# Patient Record
Sex: FEMALE | NOT HISPANIC OR LATINO | Employment: STUDENT | URBAN - METROPOLITAN AREA
[De-identification: names, ages, dates, MRNs, and addresses within clinical notes are randomized per-mention and may not be internally consistent; named-entity substitution may affect disease eponyms.]

---

## 2017-02-16 ENCOUNTER — TELEPHONE (OUTPATIENT)
Dept: OBGYN | Facility: CLINIC | Age: 22
End: 2017-02-16

## 2017-02-16 NOTE — TELEPHONE ENCOUNTER
Left  monica Kan that is ok to take prescribed Nitrofurantoin for UTI s/s as long as she isn't pregnant.  I instructed her to call in 2-3 days if no improvement with taking the medicine.    If she is pregnant I told her to call back.

## 2017-02-23 ENCOUNTER — OFFICE VISIT (OUTPATIENT)
Dept: OBGYN | Facility: CLINIC | Age: 22
End: 2017-02-23
Payer: COMMERCIAL

## 2017-02-23 VITALS
SYSTOLIC BLOOD PRESSURE: 122 MMHG | HEIGHT: 65 IN | WEIGHT: 133.1 LBS | DIASTOLIC BLOOD PRESSURE: 78 MMHG | BODY MASS INDEX: 22.17 KG/M2 | HEART RATE: 82 BPM

## 2017-02-23 DIAGNOSIS — B37.31 CANDIDIASIS OF VULVA AND VAGINA: Primary | ICD-10-CM

## 2017-02-23 PROCEDURE — 99212 OFFICE O/P EST SF 10 MIN: CPT | Mod: ZF

## 2017-02-23 RX ORDER — FLUCONAZOLE 150 MG/1
150 TABLET ORAL ONCE
Qty: 1 TABLET | Refills: 0 | Status: SHIPPED | OUTPATIENT
Start: 2017-02-23 | End: 2017-02-23

## 2017-02-23 NOTE — PROGRESS NOTES
SUBJECTIVE:   21 year old  female complains of thick white vaginal discharge for 2 days.  She has had some itching and irritation as well.  She completed a 5-day course of antibiotics for a UTI yesterday. She states that when she was previously on antibiotics she developed a yeast infection and feels this is what is happening again. She feels her UTI has resolved.   Denies abnormal vaginal bleeding or significant pelvic pain or fever.  She is having regular periods and denies any menstrual concerns.   She has been sexually active with a monogamous partner since August of 2016. Both she and her partner have been tested for STIs and results were negative. Using condoms for contraception, sometimes using withdrawal method. Denies dyspareunia.    No LMP recorded.    OBJECTIVE:   She appears well, afebrile.  Abdomen: benign, soft, nontender.  Pelvic Exam: Normal vagina and vulva, tenderness to palpation and placement of the speculum at introitus. A moderate amount of thick, white vaginal discharge is present at introitus and throughout vaginal canal.  Cervix without lesions or tenderness.    Wet prep exam was performed and demonstrated budding yeast and hyphae    Urinalysis was not performed    ASSESSMENT:   Candidiasis of vulva and vagina    PLAN:   Treatment: Diflucan 150 mg x 1 dose  Return to clinic if symptoms do not resolve or worsen.    I, EMILIANA Chatman, am serving as a scribe to document services personally performed by Yaquelin Lockhart CNM based on the providers statements to me.  -EMILIANA Chatman    This encounter was performed by me and scribed by the student. The scribed note accurately reflects my personal services and decisions made by me. Yaquelin EPSTEIN CNM

## 2017-02-23 NOTE — NURSING NOTE
Chief Complaint   Patient presents with     Consult For     possible yeast infection       Diane Brown, [facility] 2/23/2017

## 2017-02-23 NOTE — MR AVS SNAPSHOT
After Visit Summary   2/23/2017    Loreta Roth    MRN: [id number]           Patient Information     Date Of Birth          1995        Visit Information        Provider Department      2/23/2017 1:00 PM Yaquelin Lockhart APRN CNM [facility]        Today's Diagnoses     Candidiasis of vulva and vagina    -  1       Follow-ups after your visit        Follow-up notes from your care team     Return in about 1 year (around 2/23/2018).      Your next 10 appointments already scheduled     May 01, 2017  1:15 PM CDT   Nurse Visit with [facility] Nurse   [facility] ([facility])    [facility] [address]  [address]  [facility] [address] [phone]              Who to contact     Please call your clinic at [phone] to:    Ask questions about your health    Make or cancel appointments    Discuss your medicines    Learn about your test results    Speak to your doctor   If you have compliments or concerns about an experience at your clinic, or if you wish to file a complaint, please contact [facility] Patient Relations at [phone] or email us at [email or web address]         Additional Information About Your Visit        MyChart Information     [employer] gives you secure access to your electronic health record. If you see a primary care provider, you can also send messages to your care team and make appointments. If you have questions, please call your primary care clinic.  If you do not have a primary care provider, please call [phone] and they will assist you.      [employer] is an electronic gateway that provides easy, online access to your medical records. With [employer], you can request a clinic appointment, read your test results, renew a prescription or communicate with your care team.     To access your existing account, please contact your [facility]  [facility] [facility] or call [phone] for assistance.        Care EveryWhere ID     This is your Care EveryWhere ID. This could be used by other organizations to access your [place] medical records  [id number]        Your Vitals Were     Pulse Height BMI (Body Mass Index)             82 1.651 m (5' 5\") 22.15 kg/m2          Blood Pressure from Last 3 Encounters:   02/23/17 122/78   11/01/16 121/83    Weight from Last 3 Encounters:   02/23/17 60.4 kg (133 lb 1.6 oz)   11/01/16 61 kg (134 lb 6.4 oz)              Today, you had the following     No orders found for display         Today's Medication Changes          These changes are accurate as of: 2/23/17  4:57 PM.  If you have any questions, ask your nurse or doctor.               Start taking these medicines.        Dose/Directions    fluconazole 150 MG tablet   Commonly known as:  DIFLUCAN   Used for:  Candidiasis of vulva and vagina        Dose:  150 mg   Take 1 tablet (150 mg) by mouth once for 1 dose   Quantity:  1 tablet   Refills:  0            Where to get your medicines      These medications were sent to [id number] - [place], [place] [address] [facility] [address]  [address], [place] [address]    Hours:  24-hours Phone:  [phone]     fluconazole 150 MG tablet                Primary Care Provider    None Specified       No primary provider on file.        Thank you!     Thank you for choosing [facility]  for your care. Our goal is always to provide you with excellent care. Hearing back from our patients is one way we can continue to improve our services. Please take a few minutes to complete the written survey that you may receive in the mail after your visit with us. Thank you!             Your Updated Medication List - Protect others around you: Learn how to safely use, store and throw away your medicines at www.disposemymeds.org.          This list is accurate as of: " 2/23/17  4:57 PM.  Always use your most recent med list.                   Brand Name Dispense Instructions for use    ALEVE PO      Take by mouth as needed for moderate pain       DAILY MULTIVITAMIN PO          fluconazole 150 MG tablet    DIFLUCAN    1 tablet    Take 1 tablet (150 mg) by mouth once for 1 dose       VYVANSE 40 MG capsule   Generic drug:  lisdexamfetamine

## 2017-03-16 ENCOUNTER — TELEPHONE (OUTPATIENT)
Dept: OBGYN | Facility: CLINIC | Age: 22
End: 2017-03-16

## 2017-03-16 NOTE — TELEPHONE ENCOUNTER
Telephone call from Loreta and she reports next to her labia there is a dime size area that she thinks is a cyst. She describes this as painful and she has tried to 'pop it' but the pain is too much. She denies any drainage from this area. Call transferred to appointment desk to make this appointment, instructed to take warm sitz baths or warm packs to area.

## 2017-03-17 ENCOUNTER — OFFICE VISIT (OUTPATIENT)
Dept: OBGYN | Facility: CLINIC | Age: 22
End: 2017-03-17
Attending: NURSE PRACTITIONER
Payer: COMMERCIAL

## 2017-03-17 VITALS
DIASTOLIC BLOOD PRESSURE: 71 MMHG | BODY MASS INDEX: 21.91 KG/M2 | WEIGHT: 131.5 LBS | HEIGHT: 65 IN | HEART RATE: 71 BPM | SYSTOLIC BLOOD PRESSURE: 109 MMHG

## 2017-03-17 DIAGNOSIS — L72.3 SEBACEOUS CYST: Primary | ICD-10-CM

## 2017-03-17 DIAGNOSIS — R23.9 SKIN CHANGE: ICD-10-CM

## 2017-03-17 NOTE — MR AVS SNAPSHOT
After Visit Summary   3/17/2017    Loreta Roth    MRN: [id number]           Patient Information     Date Of Birth          1995        Visit Information        Provider Department      3/17/2017 11:15 AM Rhea Walters APRN CNP [facility]        Today's Diagnoses     Sebaceous cyst    -  1    Skin change           Follow-ups after your visit        Your next 10 appointments already scheduled     Mar 31, 2017  1:15 PM CDT   Return Visit with Jia Salinas MD   [facility] ([facility])    [facility] [address]  [address]  [address]  [facility] [address] [phone]            May 01, 2017  1:15 PM CDT   Nurse Visit with [facility] Nurse   [facility] ([facility])    [facility] [address]  [address]  [address]  [facility] [address] [phone]              Who to contact     Please call your clinic at [phone] to:    Ask questions about your health    Make or cancel appointments    Discuss your medicines    Learn about your test results    Speak to your doctor   If you have compliments or concerns about an experience at your clinic, or if you wish to file a complaint, please contact [facility] Patient Relations at [phone] or email us at [email or web address]         Additional Information About Your Visit        MyChart Information     [employer] gives you secure access to your electronic health record. If you see a primary care provider, you can also send messages to your care team and make appointments. If you have questions, please call your primary care clinic.  If you do not have a primary care provider, please call [phone] and they will assist you.      [employer] is an electronic gateway that provides easy, online access to your medical records. With [employer], you can request a clinic appointment,  "read your test results, renew a prescription or communicate with your care team.     To access your existing account, please contact your [facility] or call [phone] for assistance.        Care EveryWhere ID     This is your Care EveryWhere ID. This could be used by other organizations to access your [place] medical records  [id number]        Your Vitals Were     Pulse Height Last Period BMI (Body Mass Index)          71 1.651 m (5' 5\") 03/07/2017 21.88 kg/m2         Blood Pressure from Last 3 Encounters:   03/17/17 109/71   02/23/17 122/78   11/01/16 121/83    Weight from Last 3 Encounters:   03/17/17 59.6 kg (131 lb 8 oz)   02/23/17 60.4 kg (133 lb 1.6 oz)   11/01/16 61 kg (134 lb 6.4 oz)              Today, you had the following     No orders found for display       Primary Care Provider    None Specified       No primary provider on file.        Thank you!     Thank you for choosing [facility]  for your care. Our goal is always to provide you with excellent care. Hearing back from our patients is one way we can continue to improve our services. Please take a few minutes to complete the written survey that you may receive in the mail after your visit with us. Thank you!             Your Updated Medication List - Protect others around you: Learn how to safely use, store and throw away your medicines at www.disposemymeds.org.          This list is accurate as of: 3/17/17 11:59 PM.  Always use your most recent med list.                   Brand Name Dispense Instructions for use    ALEVE PO      Take by mouth as needed for moderate pain Reported on 3/17/2017       DAILY MULTIVITAMIN PO          VYVANSE 40 MG capsule   Generic drug:  lisdexamfetamine            "

## 2017-03-17 NOTE — LETTER
"3/17/2017       RE: Loreta Roth  [address]  [place] [address]     Dear Colleague,    Thank you for referring your patient, Loreta Roth, to the [facility] at [facility]. Please see a copy of my visit note below.    SUBJECTIVE:   21 year old female, , complains of having a cyst on her mons pubis.     Has been present for since 2017.  Fluctuates in size from the size of a quarter to the size of a dime, but never completely resolved.  Occasionally feels swollen and inflamed, but today those symptoms have resolved.  Had tried to \"pop\" it, but stopped because it was too tender.  Feels this first started as an ingrown hair. Does shave and occasionally wax pubic hair. Denies fever and erythema or discharge from the location.  Occasionally feels itchy, which is improved with hydrocortisone cream (has applied it approximately once per week).  Denies history of known exposure to STD. Her and her current partner were tested for STDs in 2016; no new partners since then.      Reports dyspareunia with insertion ever since she started being intimate with her current partner (since 2016).  Denies abdominal and pelvic pain.      Has not tried taking baths; does not have access to a clean bathtub where she lives.  Also has not tried warm compresses.       Patient's last menstrual period was 2017.  Contraception: condoms 50% of the time and withdrawal method  Last pap: 2016    OBJECTIVE:   She appears well, afebrile.  Abdomen: benign, soft, non-tender, no masses.  Pelvic Exam:   Pea-sized, fluctuant lump on left side of mons pubis; no raised center or \"head,\" without erythema, drainage, or tenderness    Thickened, bumpy, whitened/ grey tissue at introitus; without distinct lesions, small excoriation in skin noted, without bleeding; no erythema or drainage; extremely tender to the touch.      Labia minora and majora " with normal genital architecture.      ASSESSMENT:      Skin change  Sebaceous cyst    PLAN:   Treatment:   - Skin change at Introitus: Patient referred to ObGyn physician for further evaluation.  A mirror was utilized to show patient the location of this thickened white/grey tissue.  Provided patient with lidocaine gel to apply to this location given its significant tenderness.    - Pea-sized Sebaceous cyst on mons pubis: Recommended patient apply warm compresses 3 times per day. Instructed Loreta to not use a needle to try to release the fluid.  May gently massage the cyst after application of warm compresses.   If not resolved, patient to also address this at appointment with ObGyn physician.    Loreta expressed understanding and had no further questions at the end of this visit.    20 minutes was spent in direct contact with the patient and > 50% of the time in patient education and coordination of care.    Rhea Walters, PRASAD, APRN, WHNP

## 2017-03-19 NOTE — PROGRESS NOTES
"SUBJECTIVE:   21 year old female, , complains of having a cyst on her mons pubis.     Has been present for since 2017.  Fluctuates in size from the size of a quarter to the size of a dime, but never completely resolved.  Occasionally feels swollen and inflamed, but today those symptoms have resolved.  Had tried to \"pop\" it, but stopped because it was too tender.  Feels this first started as an ingrown hair. Does shave and occasionally wax pubic hair. Denies fever and erythema or discharge from the location.  Occasionally feels itchy, which is improved with hydrocortisone cream (has applied it approximately once per week).  Denies history of known exposure to STD. Her and her current partner were tested for STDs in 2016; no new partners since then.      Reports dyspareunia with insertion ever since she started being intimate with her current partner (since 2016).  Denies abdominal and pelvic pain.      Has not tried taking baths; does not have access to a clean bathtub where she lives.  Also has not tried warm compresses.       Patient's last menstrual period was 2017.  Contraception: condoms 50% of the time and withdrawal method  Last pap: 2016    OBJECTIVE:   She appears well, afebrile.  Abdomen: benign, soft, non-tender, no masses.  Pelvic Exam:   Pea-sized, fluctuant lump on left side of mons pubis; no raised center or \"head,\" without erythema, drainage, or tenderness    Thickened, bumpy, whitened/ grey tissue at introitus; without distinct lesions, small excoriation in skin noted, without bleeding; no erythema or drainage; extremely tender to the touch.      Labia minora and majora with normal genital architecture.      ASSESSMENT:      Skin change  Sebaceous cyst    PLAN:   Treatment:   - Skin change at Introitus: Patient referred to ObGyn physician for further evaluation.  A mirror was utilized to show patient the location of this thickened white/grey tissue.  Provided " patient with lidocaine gel to apply to this location given its significant tenderness.    - Pea-sized Sebaceous cyst on mons pubis: Recommended patient apply warm compresses 3 times per day. Instructed Loreta to not use a needle to try to release the fluid.  May gently massage the cyst after application of warm compresses.   If not resolved, patient to also address this at appointment with ObGyn physician.    Loreta expressed understanding and had no further questions at the end of this visit.    20 minutes was spent in direct contact with the patient and > 50% of the time in patient education and coordination of care.    Rhea Walters, DNP, APRN, WHNP

## 2017-03-31 ENCOUNTER — OFFICE VISIT (OUTPATIENT)
Dept: OBGYN | Facility: CLINIC | Age: 22
End: 2017-03-31
Attending: OBSTETRICS & GYNECOLOGY
Payer: COMMERCIAL

## 2017-03-31 VITALS
SYSTOLIC BLOOD PRESSURE: 116 MMHG | BODY MASS INDEX: 22.17 KG/M2 | DIASTOLIC BLOOD PRESSURE: 72 MMHG | WEIGHT: 133.2 LBS | HEART RATE: 66 BPM

## 2017-03-31 DIAGNOSIS — N90.89 LESION OF VULVA: Primary | ICD-10-CM

## 2017-03-31 DIAGNOSIS — N90.89 LESION OF VULVA: ICD-10-CM

## 2017-03-31 PROCEDURE — 99212 OFFICE O/P EST SF 10 MIN: CPT | Mod: ZF

## 2017-03-31 PROCEDURE — 88305 TISSUE EXAM BY PATHOLOGIST: CPT | Performed by: STUDENT IN AN ORGANIZED HEALTH CARE EDUCATION/TRAINING PROGRAM

## 2017-03-31 ASSESSMENT — PAIN SCALES - GENERAL: PAINLEVEL: NO PAIN (0)

## 2017-03-31 NOTE — NURSING NOTE
Chief Complaint   Patient presents with     RECHECK     Follow-up perineal itching   Nilam Garrido LPN

## 2017-03-31 NOTE — PROGRESS NOTES
[facility]  Gynecology Visit    Reason for Consult: Introital skin lesion, dyspareunia  Consulting Provider: LUCIAN Dunn CNP    HPI:    Loreta Roth is a 21 year old , here for introital skin changes associated with dyspareunia. Dyspareunia started 10/2016 when she began having intercourse with current partner in 10/2016. She did have 1 partner beforehand that she did not experience pain with. She feels that this partner is larger and always associated the pain with that. She was given lidocaine gel for tenderness and used this that day which helped. She hasn't had much intercourse since that time and hasn't noticed a real change in the pain. Denies itching or discharge. Looked once at the lesions and noticed it was still blanched 1 week after he appt with Rhea Walters.  Doesn't feel dry with intercourse and has tried lubricant once which irritated her partners skin so hasn't since. She does note that her ADHD medication dries her out a fair amount though and has to drink lots of water and use chapstick frequently because of it.    She did note tiny red sores in Oct, would come one day and bleed then go away (lasts for a day total). These resolved on their own, got STD testing and shave biopsy of lesions at [facility] that were all negative (doesn't remember exactly what they said the shave biopsy was). Hasn't had any since. Denies using new skin products, lotions, soaps, detergent.  Sebaceous cyst that was seen by Romeo, has decreased a lot in size. Hasn't done warm compresses much. Does know this is from shaving, just kind of annoying. Knows she should try compresses more that she just gets busy.    GYN History  - LMP: Patient's last menstrual period was 2017.  - Menses: regular, painful, 7days, clots, heavy, 3 tampons per day  - Pap Smears:   Lab Results   Component Value Date    PAP NIL 2016     - Contraception: condoms 50% of the time plus withdrawal method  -  Sexual Activity/Concerns: Dyspareunia  - Hx STIs/UTIs: Denies, UTIs approx. 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    FamHx:  Not significant    ROS: 10-Point ROS negative except as noted in HPI    Physical Exam  /72  Pulse 66  Wt 60.4 kg (133 lb 3.2 oz)  LMP 2017  Breastfeeding? No  BMI 22.17 kg/m2  Gen: Well-appearing, NAD  HEENT: Normocephalic, atraumatic  Abd: Soft, non-tener, non-distended  Ext: No LE edema, extremities warm and well perfused    Pelvic:  Normal labia minora and majora architecture. Normal hair distribution. At the posterior fourchette there is grey/white verrucous region approx. 2cm wide and 1cm of depth into the vagina. Vagina is without lesions. White physiologic discharge. Cervix nulliparous, no lesions, no cervical motion tenderness. Uterus is small, mobile, non-tender. No adnexal tenderness or masses    Assessment/Plan:  Loreta Roth is a 21 year old  female here for painful lesions of posterior fourchette. It is possible that this lesion is a genital wart or scar tissue from multiple micro-tears with intercourse. It is not characteristic of either in particular.  - biopsy of region taken to the left of midline (see procedure note below ), I personally will call the patient with results  - S/p 2 injections of HPV series (16, 16), last injection can be given after 17 and thus can get at next appt, will offer  - estrogen cream, directed to apply to posterior fourchette daily PRN    Return to clinic in 2-3 weeks for f/u appt    Staffed with Dr. Amaury Salinas MD  OBGYN PGY-1  [phone]  3/31/2017, 1:20 PM    Procedure Note    ASSESSMENT:   Vulvar lesion    PLAN:   Vulvar Punch Biopsy Procedure:    Time Out -  "\"Pause for the Cause\"  Just before the procedure begins, through verbal and active participation of team members, verify:   Initials   Patient Name SMB   Patient  SMB   Procedure to be performed SMB     Preoperative Diagnosis: vulvar lesions   Postoperative Diagnosis:  same    Consent:   Risks, benefits of treatment, and alternate forms of evaluation were discussed.  Patient's questions were elicited and answered.Verbal consent obtained. Written consent signed and scanned    Skin Preparation:  Betadine    Anesthesia: lidocaine gel and lidocaine plain 1% subcutaneous      A 3 mm punch biopsy obtained at the posterior fourchette slightly to the left of midline.  Specimens were placed in labeled Formalin Jar.    EBL:  1ml  Stasis achieved with silver nitrate and pressure. Complications:  None    Specimens sent to Pathology.  Will call with pathology results and recommended follow-up.     Tolerance of Procedure: Patient did tolerate the procedure well.     Follow Up: Rinse with cool water after voiding and defecation. Keep wound dry for 24 hours then may shower. Take OTC pain medications as needed.     Written instructions on wound care and vulvodynia were reviewed and given. Call if redness, swelling, severe pain or any other concerns.     Verbalized understanding and agreement with visit plan.    Jia Salinas MD    I have seen, examined and counseled the patient with the resident. I was present for the procedure.   I reviewed and edited the note   Yaquelin Rebolledo    "

## 2017-03-31 NOTE — MR AVS SNAPSHOT
After Visit Summary   3/31/2017    Loreta Roth    MRN: [id number]           Patient Information     Date Of Birth          1995        Visit Information        Provider Department      3/31/2017 1:15 PM Jia Salinas MD [facility]        Today's Diagnoses     Lesion of vulva    -  1      Care Instructions    Use the cream at the site of pain daily as needed.    We will call with biopsy results.        Follow-ups after your visit        Follow-up notes from your care team     Return in about 3 weeks (around 4/21/2017), or if symptoms worsen or fail to improve.      Your next 10 appointments already scheduled     May 01, 2017  1:15 PM CDT   Nurse Visit with [facility] Nurse   [facility] ([facility])    [facility] [address]  [address]  [facility] [address] [phone]              Who to contact     Please call your clinic at [phone] to:    Ask questions about your health    Make or cancel appointments    Discuss your medicines    Learn about your test results    Speak to your doctor   If you have compliments or concerns about an experience at your clinic, or if you wish to file a complaint, please contact [facility] Patient Relations at [phone] or email us at [email or web address]         Additional Information About Your Visit        MyChart Information     Fyber gives you secure access to your electronic health record. If you see a primary care provider, you can also send messages to your care team and make appointments. If you have questions, please call your primary care clinic.  If you do not have a primary care provider, please call [phone] and they will assist you.      Fyber is an electronic gateway that provides easy, online access to your medical records. With Fyber, you can request a clinic appointment, read your test results,  renew a prescription or communicate with your care team.     To access your existing account, please contact your [facility] or call [phone] for assistance.        Care EveryWhere ID     This is your Care EveryWhere ID. This could be used by other organizations to access your [facility] medical records  [id number]        Your Vitals Were     Pulse Last Period Breastfeeding? BMI (Body Mass Index)          66 03/07/2017 No 22.17 kg/m2         Blood Pressure from Last 3 Encounters:   03/31/17 116/72   03/17/17 109/71   02/23/17 122/78    Weight from Last 3 Encounters:   03/31/17 60.4 kg (133 lb 3.2 oz)   03/17/17 59.6 kg (131 lb 8 oz)   02/23/17 60.4 kg (133 lb 1.6 oz)              We Performed the Following     Surgical pathology exam          Today's Medication Changes          These changes are accurate as of: 3/31/17  2:44 PM.  If you have any questions, ask your nurse or doctor.               Start taking these medicines.        Dose/Directions    conjugated estrogens cream   Commonly known as:  PREMARIN   Used for:  Lesion of vulva        Dose:  0.5 g   Place 0.5 g vaginally daily as needed Apply to area of tenderness at the posterior introitus daily as needed   Quantity:  30 g   Refills:  0            Where to get your medicines      Some of these will need a paper prescription and others can be bought over the counter.  Ask your nurse if you have questions.     Bring a paper prescription for each of these medications     conjugated estrogens cream                Primary Care Provider    None Specified       No primary provider on file.        Thank you!     Thank you for choosing [facility]  for your care. Our goal is always to provide you with excellent care. Hearing back from our patients is one way we can continue to improve our services. Please take a few minutes to complete the written survey that you may receive in the mail after your visit with  us. Thank you!             Your Updated Medication List - Protect others around you: Learn how to safely use, store and throw away your medicines at www.disposemymeds.org.          This list is accurate as of: 3/31/17  2:44 PM.  Always use your most recent med list.                   Brand Name Dispense Instructions for use    ALEVE PO      Take by mouth as needed for moderate pain Reported on 3/17/2017       conjugated estrogens cream    PREMARIN    30 g    Place 0.5 g vaginally daily as needed Apply to area of tenderness at the posterior introitus daily as needed       DAILY MULTIVITAMIN PO          VYVANSE 40 MG capsule   Generic drug:  lisdexamfetamine

## 2017-03-31 NOTE — LETTER
3/31/2017       RE: Loreta Roth  [address]  [address]  [place] [address]     Dear Colleague,    Thank you for referring your patient, Loreta Roth, to the [facility] at [facility]. Please see a copy of my visit note below.    [facility]  Gynecology Visit    Reason for Consult: Introital skin lesion, dyspareunia  Consulting Provider: LUCIAN Dunn CNP    HPI:    Loreta Roth is a 21 year old , here for introital skin changes associated with dyspareunia. Dyspareunia started 10/2016 when she began having intercourse with current partner in 10/2016. She did have 1 partner beforehand that she did not experience pain with. She feels that this partner is larger and always associated the pain with that. She was given lidocaine gel for tenderness and used this that day which helped. She hasn't had much intercourse since that time and hasn't noticed a real change in the pain. Denies itching or discharge. Looked once at the lesions and noticed it was still blanched 1 week after he appt with Rhea Walters.  Doesn't feel dry with intercourse and has tried lubricant once which irritated her partners skin so hasn't since. She does note that her ADHD medication dries her out a fair amount though and has to drink lots of water and use chapstick frequently because of it.    She did note tiny red sores in Oct, would come one day and bleed then go away (lasts for a day total). These resolved on their own, got STD testing and shave biopsy of lesions at [facility] that were all negative (doesn't remember exactly what they said the shave biopsy was). Hasn't had any since. Denies using new skin products, lotions, soaps, detergent.  Sebaceous cyst that was seen by Romeo, has decreased a lot in size. Hasn't done warm compresses much. Does know this is from shaving, just kind of annoying. Knows she should try compresses more that she just  gets busy.    GYN History  - LMP: Patient's last menstrual period was 2017.  - Menses: regular, painful, 7days, clots, heavy, 3 tampons per day  - Pap Smears:   Lab Results   Component Value Date    PAP NIL 2016     - Contraception: condoms 50% of the time plus withdrawal method  - Sexual Activity/Concerns: Dyspareunia  - Hx STIs/UTIs: Denies, UTIs approx. 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    FamHx:  Not significant    ROS: 10-Point ROS negative except as noted in HPI    Physical Exam  /72  Pulse 66  Wt 60.4 kg (133 lb 3.2 oz)  LMP 2017  Breastfeeding? No  BMI 22.17 kg/m2  Gen: Well-appearing, NAD  HEENT: Normocephalic, atraumatic  Abd: Soft, non-tener, non-distended  Ext: No LE edema, extremities warm and well perfused    Pelvic:  Normal labia minora and majora architecture. Normal hair distribution. At the posterior fourchette there is grey/white verrucous region approx. 2cm wide and 1cm of depth into the vagina. Vagina is without lesions. White physiologic discharge. Cervix nulliparous, no lesions, no cervical motion tenderness. Uterus is small, mobile, non-tender. No adnexal tenderness or masses    Assessment/Plan:  Loreta Roth is a 21 year old  female here for painful lesions of posterior fourchette. It is possible that this lesion is a genital wart or scar tissue from multiple micro-tears with intercourse. It is not characteristic of either in particular.  - biopsy of region taken to the left of midline (see procedure note below ), I personally will call the patient with results  - S/p 2 injections of HPV series (16, 16), last injection can be given after 17 and thus can get at next appt, will offer  - estrogen cream, directed  "to apply to posterior fourchette daily PRN    Return to clinic in 2-3 weeks for f/u appt    Staffed with Dr. Amaury Salinas MD  OBGYN PGY-1  [phone]  3/31/2017, 1:20 PM    Procedure Note    ASSESSMENT:   Vulvar lesion    PLAN:   Vulvar Punch Biopsy Procedure:    Time Out - \"Pause for the Cause\"  Just before the procedure begins, through verbal and active participation of team members, verify:   Initials   Patient Name SMB   Patient  SMB   Procedure to be performed SMB     Preoperative Diagnosis: vulvar lesions   Postoperative Diagnosis:  same    Consent:   Risks, benefits of treatment, and alternate forms of evaluation were discussed.  Patient's questions were elicited and answered.Verbal consent obtained. Written consent signed and scanned    Skin Preparation:  Betadine    Anesthesia: lidocaine gel and lidocaine plain 1% subcutaneous      A 3 mm punch biopsy obtained at the posterior fourchette slightly to the left of midline.  Specimens were placed in labeled Formalin Jar.    EBL:  1ml  Stasis achieved with silver nitrate and pressure. Complications:  None    Specimens sent to Pathology.  Will call with pathology results and recommended follow-up.     Tolerance of Procedure: Patient did tolerate the procedure well.     Follow Up: Rinse with cool water after voiding and defecation. Keep wound dry for 24 hours then may shower. Take OTC pain medications as needed.     Written instructions on wound care and vulvodynia were reviewed and given. Call if redness, swelling, severe pain or any other concerns.     Verbalized understanding and agreement with visit plan.    Jia Salinas MD    I have seen, examined and counseled the patient with the resident. I was present for the procedure.   I reviewed and edited the note   Yaquelin Rebolledo    "

## 2017-04-04 LAB — COPATH REPORT: NORMAL

## 2017-04-05 ENCOUNTER — TELEPHONE (OUTPATIENT)
Dept: OBGYN | Facility: CLINIC | Age: 22
End: 2017-04-05

## 2017-04-05 NOTE — TELEPHONE ENCOUNTER
Loreta called requesting to discuss her pathology results. Reviewed with Dr. Rebolledo and Dr. Salinas will contact patient this week.

## 2017-04-07 ENCOUNTER — TELEPHONE (OUTPATIENT)
Dept: OBGYN | Facility: CLINIC | Age: 22
End: 2017-04-07

## 2017-04-07 DIAGNOSIS — D07.1 VIN III (VULVAR INTRAEPITHELIAL NEOPLASIA III): Primary | ICD-10-CM

## 2017-04-07 NOTE — PROGRESS NOTES
Spoke with patient about Vulvar biopsy showing MAURA III. Recommend CO2 laser treatment of the area and vulvoscopy to identify other areas to treat. Instructed she will need Q 6 month vulvoscopy to assess for lesion re-occurrence or spread. Questions answered. Orders in for Vulvoscopy, CO2 laser treatment of vulva.     Cheryle Ash MD   Resident Physician, PGY1  Obstetrics, Gynecology, and Women's Health

## 2017-04-10 ENCOUNTER — TELEPHONE (OUTPATIENT)
Dept: OBGYN | Facility: CLINIC | Age: 22
End: 2017-04-10

## 2017-04-10 NOTE — TELEPHONE ENCOUNTER
Loreta left  that her mother,Kimberly, will be calling to discuss her Path results from 3/31/17 and authorized us to speak with her mother.    Loreta is very upset about the Path results and worried that is was staged 3 and concerned if she needs to get another opinion and seems confused about what the next step is.    I will forward both, Loreta's concerns and her mother Kimberly's to Dr Salinas. Kimberly lives in [place],Loreta is a  of [place] student.   Kimberly would like a call from Dr Salinas,she has many questions I couldn't answer.

## 2017-04-11 ENCOUNTER — TELEPHONE (OUTPATIENT)
Dept: OTHER | Facility: CLINIC | Age: 22
End: 2017-04-11

## 2017-04-11 ENCOUNTER — TELEPHONE (OUTPATIENT)
Dept: OBGYN | Facility: CLINIC | Age: 22
End: 2017-04-11

## 2017-04-11 NOTE — TELEPHONE ENCOUNTER
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 Answered many questions and after explanation of procedures and pros/cons ( varying recovery, knowledge of surgical margins) she decided she would like to go ahead with WLE instead of CO2 laser. She also wanted to know the followin. Naturopath that she would like to go to - I said this was fine, just that I would like to work closely with the naturopath to be sure none of the suggestions may be harmful. She will call the provider and provide me with her number.  2. Does she need another Pap smear? NIL 2016. I will discuss this with my staff and if so we can do one at time of procedure.  3. External hemorrhoid she would like removed if possible. I told her in may be possible to have colorectal come in during her surgery, I will look into a referral and facilitating this if possible.    I did also speak to her mother briefly, but she was busy and will call me to speak in detail this evening.    Jia Salinas MD  OB Gyn PGY1  17 11:52 AM

## 2017-04-18 ENCOUNTER — TELEPHONE (OUTPATIENT)
Dept: OBGYN | Facility: CLINIC | Age: 22
End: 2017-04-18

## 2017-04-18 NOTE — TELEPHONE ENCOUNTER
Attempted to reach Loreta but got vm,left vm for her to call clinic in am to schedule surgery with Dr Rebolledo. I discussed with Dr Rebolledo today and Loreta doesn't need to be seen again before the surgery, and the guzman-op orders that are in place are ok,they don't need to be changed. I forwarded this message to Palmer,our surgery scheduler to call Loreta tomorrow.    I also left vm for Loreta feel free to call to triage tomorrow and we can forward her call to the surgery scheduler.    I also got a vm form Loreta's mother, Kimberly who is concerned because the surgery isn't scheduled yet. She needs to come from [place]. I attempted to call her back as well but was unable to leave a message.

## 2017-04-24 ENCOUNTER — TELEPHONE (OUTPATIENT)
Dept: OBGYN | Facility: CLINIC | Age: 22
End: 2017-04-24

## 2017-04-24 NOTE — TELEPHONE ENCOUNTER
Confirmed surgery date, time and location 5/16/17 with arrival time at 7:30a.m with nothing to eat eight hours before scheduled surgery time and clear liquids up to two hours before scheduled surgery time,informed patient that she will need to sched a pre op physical within thirty days of surgery and that a map and letter will be mailed out.     to complete the following fields:            CHECKLIST     Google Calendar : Yes     Resident notified:YES      Clinic schedule blocked:  Not Applicable    Patient notified:Yes      Pre op information sent: Yes     Given to patient over the phone.Yes    Comments:

## 2017-04-27 ENCOUNTER — TELEPHONE (OUTPATIENT)
Dept: OBGYN | Facility: CLINIC | Age: 22
End: 2017-04-27

## 2017-04-27 NOTE — TELEPHONE ENCOUNTER
Nurse spoke with patient's mother and she is asking about recovery.   Spoke with Dr. Rebolledo in clinic. She expects a 2-3 day recovery. Nurse spoke with Kimberly and she was happy with this information.

## 2017-05-03 ENCOUNTER — ALLIED HEALTH/NURSE VISIT (OUTPATIENT)
Dept: OBGYN | Facility: CLINIC | Age: 22
End: 2017-05-03
Attending: STUDENT IN AN ORGANIZED HEALTH CARE EDUCATION/TRAINING PROGRAM
Payer: COMMERCIAL

## 2017-05-03 DIAGNOSIS — Z23 IMMUNIZATION DUE: Primary | ICD-10-CM

## 2017-05-03 PROCEDURE — 90651 9VHPV VACCINE 2/3 DOSE IM: CPT | Mod: ZF

## 2017-05-03 PROCEDURE — 40000269 ZZH STATISTIC NO CHARGE FACILITY FEE

## 2017-05-03 PROCEDURE — 99212 OFFICE O/P EST SF 10 MIN: CPT | Mod: ZF

## 2017-05-03 PROCEDURE — 25000125 ZZHC RX 250: Mod: ZF

## 2017-05-03 PROCEDURE — 90472 IMMUNIZATION ADMIN EACH ADD: CPT | Mod: ZF

## 2017-05-03 NOTE — MR AVS SNAPSHOT
After Visit Summary   5/3/2017    Loreta Roth    MRN: [id number]           Patient Information     Date Of Birth          1995        Visit Information        Provider Department      5/3/2017 1:15 PM Nurse, [facility]        Today's Diagnoses     Immunization due    -  1       Follow-ups after your visit        Your next 10 appointments already scheduled     May 11, 2017  1:15 PM CDT   Return Visit with Jia Salinas MD   [facility] ([facility])    [facility] [address]  [address]  [facility] [id number] [phone]            May 16, 2017   Procedure with Yaquelin Rebolledo MD   [facility], [place], Same Day Surgery (--)    [address] [id number] [phone]            May 30, 2017  1:15 PM CDT   Return Visit with Maryanne Salazar MD   [facility] ([facility])    [facility] [address]  [address] [facility] [id number] [phone]              Who to contact     Please call your clinic at [phone] to:    Ask questions about your health    Make or cancel appointments    Discuss your medicines    Learn about your test results    Speak to your doctor   If you have compliments or concerns about an experience at your clinic, or if you wish to file a complaint, please contact [facility] Patient Relations at [phone] or email us at [email or web address]         Additional Information About Your Visit        Gennyhart Information     Tracksmitht gives you secure access to your electronic health record. If you see a primary care provider, you can also send messages to your care team and make appointments. If you have questions, please call your primary care clinic.  If you do not have a primary care provider, please call [phone] and they will assist you.      Charisse is an  electronic gateway that provides easy, online access to your medical records. With Reddit, you can request a clinic appointment, read your test results, renew a prescription or communicate with your care team.     To access your existing account, please contact your [facility] or call [phone] for assistance.        Care EveryWhere ID     This is your Care EveryWhere ID. This could be used by other organizations to access your [facility] medical records  [id number]         Blood Pressure from Last 3 Encounters:   03/31/17 116/72   03/17/17 109/71   02/23/17 122/78    Weight from Last 3 Encounters:   03/31/17 60.4 kg (133 lb 3.2 oz)   03/17/17 59.6 kg (131 lb 8 oz)   02/23/17 60.4 kg (133 lb 1.6 oz)              We Performed the Following     HUMAN PAPILLOMAVIRUS VACCINE        Primary Care Provider    None Specified       No primary provider on file.        Thank you!     Thank you for choosing [facility]  for your care. Our goal is always to provide you with excellent care. Hearing back from our patients is one way we can continue to improve our services. Please take a few minutes to complete the written survey that you may receive in the mail after your visit with us. Thank you!             Your Updated Medication List - Protect others around you: Learn how to safely use, store and throw away your medicines at www.disposemymeds.org.          This list is accurate as of: 5/3/17  1:26 PM.  Always use your most recent med list.                   Brand Name Dispense Instructions for use    ALEVE PO      Take by mouth as needed for moderate pain Reported on 3/17/2017       conjugated estrogens cream    PREMARIN    30 g    Place 0.5 g vaginally daily as needed Apply to area of tenderness at the posterior introitus daily as needed       DAILY MULTIVITAMIN PO          VYVANSE 40 MG capsule   Generic drug:  lisdexamfetamine

## 2017-05-03 NOTE — NURSING NOTE
Chief Complaint   Patient presents with     Allied Health Visit     3rd HPV injection       Loreta Roth is a 21 year old female who presents today for 3rd HPV injection , see immunization for details.  Nilam Garrido LPN

## 2017-05-11 ENCOUNTER — OFFICE VISIT (OUTPATIENT)
Dept: OBGYN | Facility: CLINIC | Age: 22
End: 2017-05-11
Attending: STUDENT IN AN ORGANIZED HEALTH CARE EDUCATION/TRAINING PROGRAM
Payer: COMMERCIAL

## 2017-05-11 VITALS
WEIGHT: 129 LBS | BODY MASS INDEX: 21.47 KG/M2 | HEART RATE: 105 BPM | DIASTOLIC BLOOD PRESSURE: 89 MMHG | SYSTOLIC BLOOD PRESSURE: 129 MMHG

## 2017-05-11 DIAGNOSIS — D07.1 VIN III (VULVAR INTRAEPITHELIAL NEOPLASIA III): Primary | ICD-10-CM

## 2017-05-11 DIAGNOSIS — Z00.00 PREVENTATIVE HEALTH CARE: ICD-10-CM

## 2017-05-11 ASSESSMENT — PAIN SCALES - GENERAL: PAINLEVEL: NO PAIN (0)

## 2017-05-11 NOTE — PROGRESS NOTES
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 She saw the naturopath we discussed on the phone who made some diet modifications and vitamin recommendations including eating more greens, taking vit C, a probiotic, selenium, and vit A. There were a few other vitamins as well that she cannot remember. The naturopath agreed that the main treatment was surgical management, but that her therapies would be an adjuvant to help her body clear the HPV. She will message me with the naturopath's contact information.  Otherwise she has had continued itching of her anus and posterior fourchette, which the hydrocortisone cream helps with, no other big changes. Does want repeat STD testing.     She is moving out of her apartment here in the [place] for the summer on June 1st. She will move her things back to [place] where her Mom lives for the summer and return here in Sept. She is planning a trip to [employer] with 3 week internship in [place] then 1 week in [place] with friends from [place] and glad that she is getting this surgery out of the way prior to moving and traveling.     ROS: 10 point ROS neg other than the symptoms noted above in the HPI.      PMHx: ADHD  PSHx: Denies  Meds: Lisdexamfetamine, MV, naproxen  Allergies: PCN - hives    SH: Nonsmoker. Lives [place]. In grad school.    O: /89  Pulse 105  Wt 58.5 kg (129 lb)  LMP 05/05/2017  Breastfeeding? 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 Roth is a 22yo G0 here for follow from new diagnosis of MAURA 3.    1) VIN3 - Planned WLE & CO2 laser for any satellite lesions, will reschedule for Tues May 23rd when both myself and Dr. Rebolledo are available. She is fine with this plan.  - Discussed details of surgery including risks, benefits and alternatives as well as what to expect in terms of healing. Written informed consent obtained.  - She will have a post-op visit at 2-4 weeks with her primary care provider in [place]  - Discussed pathogenesis of disease and surveillance thereafter with vuvloscopy at 6mo intervals.  - Discussed that I do not recommend another Pap smear at this time given recent normal and slow progression of HPV lesions as well as high likelihood of clearance given her age.  2) Desire for STD testing - HIV, RPR, & urine GC/CT ordered. I will call patient with results    Jia Salinas MD PGY1  [facility]  [phone]    The Patient was seen in Resident Continuity Clinic by JIA SALINAS.  I reviewed the history & exam. Assessment and plan were jointly made.    Sri Duncan MD

## 2017-05-11 NOTE — MR AVS SNAPSHOT
After Visit Summary   5/11/2017    Loreta Roth    MRN: [id number]           Patient Information     Date Of Birth          1995        Visit Information        Provider Department      5/11/2017 1:15 PM Jia Salinas MD [facility]        Today's Diagnoses     MAURA III (vulvar intraepithelial neoplasia III)    -  1    Preventative health care           Follow-ups after your visit        Follow-up notes from your care team     Return in about 6 months (around 11/11/2017) for Vulvoscopy for VIN3 s/p WLE 5/23.      Your next 10 appointments already scheduled     May 30, 2017   Procedure with Yaquelin Rebolledo MD   [facility], [place], Same Day Surgery (--)    [address] [address] [phone]              Who to contact     Please call your clinic at [phone] to:    Ask questions about your health    Make or cancel appointments    Discuss your medicines    Learn about your test results    Speak to your doctor   If you have compliments or concerns about an experience at your clinic, or if you wish to file a complaint, please contact [facility] Patient Relations at [phone] or email us at [email or web address]         Additional Information About Your Visit        MyChart Information     [employer] gives you secure access to your electronic health record. If you see a primary care provider, you can also send messages to your care team and make appointments. If you have questions, please call your primary care clinic.  If you do not have a primary care provider, please call [phone] and they will assist you.      [employer] is an electronic gateway that provides easy, online access to your medical records. With [employer], you can request a clinic appointment, read your test results, renew a prescription or communicate with your care team.     To access your existing account, please contact your [facility] or call [phone] for assistance.        Care EveryWhere ID     This is your Care EveryWhere ID. This could be used by other organizations to access your [facility] medical records  [id number]        Your Vitals Were     Pulse Last Period Breastfeeding? BMI (Body Mass Index)          105 05/05/2017 No 21.47 kg/m2         Blood Pressure from Last 3 Encounters:   05/11/17 129/89   03/31/17 116/72   03/17/17 109/71    Weight from Last 3 Encounters:   05/11/17 58.5 kg (129 lb)   03/31/17 60.4 kg (133 lb 3.2 oz)   03/17/17 59.6 kg (131 lb 8 oz)              We Performed the Following     Chlamydia PCR (Clinic Collect)     Gonorrhea PCR        Primary Care Provider    None Specified       No primary provider on file.        Thank you!     Thank you for choosing [facility]  for your care. Our goal is always to provide you with excellent care. Hearing back from our patients is one way we can continue to improve our services. Please take a few minutes to complete the written survey that you may receive in the mail after your visit with us. Thank you!             Your Updated Medication List - Protect others around you: Learn how to safely use, store and throw away your medicines at www.disposemymeds.org.          This list is accurate as of: 5/11/17 11:59 PM.  Always use your most recent med list.                   Brand Name Dispense Instructions for use    ALEVE PO      Take by mouth as needed for moderate pain Reported on 3/17/2017       conjugated estrogens cream    PREMARIN    30 g    Place 0.5 g vaginally daily as needed Apply to area of tenderness at the posterior introitus daily as needed       DAILY MULTIVITAMIN PO          VYVANSE 40 MG capsule   Generic drug:  lisdexamfetamine

## 2017-05-11 NOTE — LETTER
5/11/2017       RE: Loreta Roth  [address]  [address]  [place] [address]     Dear Colleague,    Thank you for referring your patient, Loreta Roth, to the [facility] at [facility]. Please see a copy of my visit note below.    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 She saw the naturopath we discussed on the phone who made some diet modifications and vitamin recommendations including eating more greens, taking vit C, a probiotic, selenium, and vit A. There were a few other vitamins as well that she cannot remember. The naturopath agreed that the main treatment was surgical management, but that her therapies would be an adjuvant to help her body clear the HPV. She will message me with the naturopath's contact information.  Otherwise she has had continued itching of her anus and posterior fourchette, which the hydrocortisone cream helps with, no other big changes. Does want repeat STD testing.     She is moving out of her apartment here in the [place] for the summer on June 1st. She will move her things back to [place] where her Mom lives for the summer and return here in Sept. She is planning a trip to [facility] with 3 week internship in [place] then 1 week in [place] with friends from [place] and glad that she is getting this surgery out of the way prior to moving and traveling.     ROS: 10 point ROS neg other than the symptoms noted above in the HPI.      PMHx: ADHD  PSHx: Denies  Meds: Lisdexamfetamine, MV, naproxen  Allergies: PCN - hives  SH: Nonsmoker. Lives in [place] In grad school.    O: /89  Pulse 105  Wt 58.5 kg (129 lb)  LMP 05/05/2017  Breastfeeding? No  BMI 21.47 kg/m2  Gen: sitting up, no acute distess  Cardiac: RRR, no murmur rub or gallop  Pulm: CTAB throughout posterior lung fields  Abd: soft, nontender, normoactive BS  Ext: no edema,  nontender    VULVA, POSTERIOR FOURCHETTE, PUNCH BIOPSY (3/31/17):   - High grade squamous intraepithelial lesion (vulvar intraepithelial neoplasia 3)    Pap NIL 11/1/16  Scanned labs from [facility]:  10/21/16: HIV NR, HSV1&2 neg, GC/CT neg, RPR NR, wet prep neg  Bactrim/ampicillin resistant E.coli on urine culture 9/12/16 & 10/30/16    A/P: Ms. Roth is a 20yo G0 here for follow from new diagnosis of MAURA 3.    1) VIN3 - Planned WLE & CO2 laser for any satellite lesions, will reschedule for Tues May 23rd when both myself and Dr. Rebolledo are available. She is fine with this plan.  - Discussed details of surgery including risks, benefits and alternatives as well as what to expect in terms of healing. Written informed consent obtained.  - She will have a post-op visit at 2-4 weeks with her primary care provider in [place]  - Discussed pathogenesis of disease and surveillance thereafter with vuvloscopy at 6mo intervals.  - Discussed that I do not recommend another Pap smear at this time given recent normal and slow progression of HPV lesions as well as high likelihood of clearance given her age.  2) Desire for STD testing - HIV, RPR, & urine GC/CT ordered. I will call patient with results    Jia Salinas MD PGY1  [facility]  [phone]    The Patient was seen in [facility] by JIA SLAINAS.  I reviewed the history & exam. Assessment and plan were jointly made.    Sri Duncan MD        Again, thank you for allowing me to participate in the care of your patient.      Sincerely,    Jia Salinas MD

## 2017-05-14 LAB
C TRACH DNA SPEC QL NAA+PROBE: ABNORMAL
N GONORRHOEA DNA SPEC QL NAA+PROBE: ABNORMAL
SPECIMEN SOURCE: ABNORMAL
SPECIMEN SOURCE: ABNORMAL

## 2017-05-15 DIAGNOSIS — D07.1 VIN III (VULVAR INTRAEPITHELIAL NEOPLASIA III): Primary | ICD-10-CM

## 2017-05-15 RX ORDER — ACETAMINOPHEN 325 MG/1
975 TABLET ORAL ONCE
Status: CANCELLED | OUTPATIENT
Start: 2017-05-15 | End: 2017-05-15

## 2017-05-29 ENCOUNTER — ANESTHESIA EVENT (OUTPATIENT)
Dept: SURGERY | Facility: CLINIC | Age: 22
End: 2017-05-29
Payer: COMMERCIAL

## 2017-05-30 ENCOUNTER — ANESTHESIA (OUTPATIENT)
Dept: SURGERY | Facility: CLINIC | Age: 22
End: 2017-05-30
Payer: COMMERCIAL

## 2017-05-30 ENCOUNTER — SURGERY (OUTPATIENT)
Age: 22
End: 2017-05-30

## 2017-05-30 ENCOUNTER — HOSPITAL ENCOUNTER (OUTPATIENT)
Facility: CLINIC | Age: 22
Discharge: HOME | End: 2017-05-30
Attending: OBSTETRICS & GYNECOLOGY | Admitting: OBSTETRICS & GYNECOLOGY
Payer: COMMERCIAL

## 2017-05-30 VITALS
HEART RATE: 68 BPM | DIASTOLIC BLOOD PRESSURE: 82 MMHG | RESPIRATION RATE: 16 BRPM | BODY MASS INDEX: 21.71 KG/M2 | SYSTOLIC BLOOD PRESSURE: 117 MMHG | TEMPERATURE: 97.7 F | OXYGEN SATURATION: 100 % | WEIGHT: 130.29 LBS | HEIGHT: 65 IN

## 2017-05-30 DIAGNOSIS — N90.89 LESION OF VULVA: Primary | ICD-10-CM

## 2017-05-30 DIAGNOSIS — Z98.890 POST-OPERATIVE STATE: ICD-10-CM

## 2017-05-30 LAB — HCG UR QL: NEGATIVE

## 2017-05-30 PROCEDURE — 37000008 ZZH ANESTHESIA TECHNICAL FEE, 1ST 30 MIN: Performed by: OBSTETRICS & GYNECOLOGY

## 2017-05-30 PROCEDURE — 71000027 ZZH RECOVERY PHASE 2 EACH 15 MINS: Performed by: OBSTETRICS & GYNECOLOGY

## 2017-05-30 PROCEDURE — 25000132 ZZH RX MED GY IP 250 OP 250 PS 637: Performed by: OBSTETRICS & GYNECOLOGY

## 2017-05-30 PROCEDURE — S0020 INJECTION, BUPIVICAINE HYDRO: HCPCS | Performed by: OBSTETRICS & GYNECOLOGY

## 2017-05-30 PROCEDURE — 88305 TISSUE EXAM BY PATHOLOGIST: CPT | Performed by: OBSTETRICS & GYNECOLOGY

## 2017-05-30 PROCEDURE — 25000125 ZZHC RX 250: Performed by: OBSTETRICS & GYNECOLOGY

## 2017-05-30 PROCEDURE — 36000057 ZZH SURGERY LEVEL 3 1ST 30 MIN - UMMC: Performed by: OBSTETRICS & GYNECOLOGY

## 2017-05-30 PROCEDURE — 88305 TISSUE EXAM BY PATHOLOGIST: CPT | Mod: 26 | Performed by: OBSTETRICS & GYNECOLOGY

## 2017-05-30 PROCEDURE — 40000170 ZZH STATISTIC PRE-PROCEDURE ASSESSMENT II: Performed by: OBSTETRICS & GYNECOLOGY

## 2017-05-30 PROCEDURE — 25000125 ZZHC RX 250: Performed by: NURSE ANESTHETIST, CERTIFIED REGISTERED

## 2017-05-30 PROCEDURE — 27210794 ZZH OR GENERAL SUPPLY STERILE: Performed by: OBSTETRICS & GYNECOLOGY

## 2017-05-30 PROCEDURE — 37000009 ZZH ANESTHESIA TECHNICAL FEE, EACH ADDTL 15 MIN: Performed by: OBSTETRICS & GYNECOLOGY

## 2017-05-30 PROCEDURE — 25000128 H RX IP 250 OP 636: Performed by: NURSE ANESTHETIST, CERTIFIED REGISTERED

## 2017-05-30 PROCEDURE — 81025 URINE PREGNANCY TEST: CPT | Performed by: OBSTETRICS & GYNECOLOGY

## 2017-05-30 PROCEDURE — 36000059 ZZH SURGERY LEVEL 3 EA 15 ADDTL MIN UMMC: Performed by: OBSTETRICS & GYNECOLOGY

## 2017-05-30 RX ORDER — HYDROMORPHONE HYDROCHLORIDE 1 MG/ML
.3-.5 INJECTION, SOLUTION INTRAMUSCULAR; INTRAVENOUS; SUBCUTANEOUS EVERY 5 MIN PRN
Status: DISCONTINUED | OUTPATIENT
Start: 2017-05-30 | End: 2017-05-30 | Stop reason: HOSPADM

## 2017-05-30 RX ORDER — ONDANSETRON 2 MG/ML
4 INJECTION INTRAMUSCULAR; INTRAVENOUS EVERY 30 MIN PRN
Status: DISCONTINUED | OUTPATIENT
Start: 2017-05-30 | End: 2017-05-30 | Stop reason: HOSPADM

## 2017-05-30 RX ORDER — MEPERIDINE HYDROCHLORIDE 25 MG/ML
12.5 INJECTION INTRAMUSCULAR; INTRAVENOUS; SUBCUTANEOUS EVERY 5 MIN PRN
Status: DISCONTINUED | OUTPATIENT
Start: 2017-05-30 | End: 2017-05-30 | Stop reason: HOSPADM

## 2017-05-30 RX ORDER — IBUPROFEN 600 MG/1
600 TABLET, FILM COATED ORAL
Status: DISCONTINUED | OUTPATIENT
Start: 2017-05-30 | End: 2017-05-30 | Stop reason: HOSPADM

## 2017-05-30 RX ORDER — IBUPROFEN 600 MG/1
600 TABLET, FILM COATED ORAL EVERY 6 HOURS PRN
Qty: 30 TABLET | Refills: 0 | Status: SHIPPED | OUTPATIENT
Start: 2017-05-30 | End: 2017-09-26

## 2017-05-30 RX ORDER — FENTANYL CITRATE 50 UG/ML
25-50 INJECTION, SOLUTION INTRAMUSCULAR; INTRAVENOUS
Status: DISCONTINUED | OUTPATIENT
Start: 2017-05-30 | End: 2017-05-30 | Stop reason: HOSPADM

## 2017-05-30 RX ORDER — OXYCODONE AND ACETAMINOPHEN 5; 325 MG/1; MG/1
1 TABLET ORAL EVERY 4 HOURS PRN
Qty: 5 TABLET | Refills: 0 | Status: SHIPPED | OUTPATIENT
Start: 2017-05-30 | End: 2017-05-30

## 2017-05-30 RX ORDER — DEXAMETHASONE SODIUM PHOSPHATE 4 MG/ML
4 INJECTION, SOLUTION INTRA-ARTICULAR; INTRALESIONAL; INTRAMUSCULAR; INTRAVENOUS; SOFT TISSUE
Status: DISCONTINUED | OUTPATIENT
Start: 2017-05-30 | End: 2017-05-30 | Stop reason: HOSPADM

## 2017-05-30 RX ORDER — LIDOCAINE HYDROCHLORIDE 20 MG/ML
INJECTION, SOLUTION INFILTRATION; PERINEURAL PRN
Status: DISCONTINUED | OUTPATIENT
Start: 2017-05-30 | End: 2017-05-30

## 2017-05-30 RX ORDER — SODIUM CHLORIDE, SODIUM LACTATE, POTASSIUM CHLORIDE, CALCIUM CHLORIDE 600; 310; 30; 20 MG/100ML; MG/100ML; MG/100ML; MG/100ML
INJECTION, SOLUTION INTRAVENOUS CONTINUOUS
Status: DISCONTINUED | OUTPATIENT
Start: 2017-05-30 | End: 2017-05-30 | Stop reason: HOSPADM

## 2017-05-30 RX ORDER — SILVER SULFADIAZINE 10 MG/G
CREAM TOPICAL
Qty: 50 G | Refills: 1 | Status: SHIPPED | OUTPATIENT
Start: 2017-05-30 | End: 2017-09-26

## 2017-05-30 RX ORDER — FENTANYL CITRATE 50 UG/ML
INJECTION, SOLUTION INTRAMUSCULAR; INTRAVENOUS PRN
Status: DISCONTINUED | OUTPATIENT
Start: 2017-05-30 | End: 2017-05-30

## 2017-05-30 RX ORDER — KETOROLAC TROMETHAMINE 30 MG/ML
INJECTION, SOLUTION INTRAMUSCULAR; INTRAVENOUS PRN
Status: DISCONTINUED | OUTPATIENT
Start: 2017-05-30 | End: 2017-05-30

## 2017-05-30 RX ORDER — ACETAMINOPHEN 325 MG/1
975 TABLET ORAL ONCE
Status: COMPLETED | OUTPATIENT
Start: 2017-05-30 | End: 2017-05-30

## 2017-05-30 RX ORDER — PROPOFOL 10 MG/ML
INJECTION, EMULSION INTRAVENOUS CONTINUOUS PRN
Status: DISCONTINUED | OUTPATIENT
Start: 2017-05-30 | End: 2017-05-30

## 2017-05-30 RX ORDER — ONDANSETRON 4 MG/1
4 TABLET, ORALLY DISINTEGRATING ORAL EVERY 30 MIN PRN
Status: DISCONTINUED | OUTPATIENT
Start: 2017-05-30 | End: 2017-05-30 | Stop reason: HOSPADM

## 2017-05-30 RX ORDER — SODIUM CHLORIDE, SODIUM LACTATE, POTASSIUM CHLORIDE, CALCIUM CHLORIDE 600; 310; 30; 20 MG/100ML; MG/100ML; MG/100ML; MG/100ML
INJECTION, SOLUTION INTRAVENOUS CONTINUOUS PRN
Status: DISCONTINUED | OUTPATIENT
Start: 2017-05-30 | End: 2017-05-30

## 2017-05-30 RX ORDER — PROPOFOL 10 MG/ML
INJECTION, EMULSION INTRAVENOUS PRN
Status: DISCONTINUED | OUTPATIENT
Start: 2017-05-30 | End: 2017-05-30

## 2017-05-30 RX ORDER — OXYCODONE AND ACETAMINOPHEN 5; 325 MG/1; MG/1
1-2 TABLET ORAL EVERY 4 HOURS PRN
Qty: 20 TABLET | Refills: 0 | Status: SHIPPED | OUTPATIENT
Start: 2017-05-30 | End: 2017-09-26

## 2017-05-30 RX ORDER — BUPIVACAINE HYDROCHLORIDE 2.5 MG/ML
INJECTION, SOLUTION INFILTRATION; PERINEURAL PRN
Status: DISCONTINUED | OUTPATIENT
Start: 2017-05-30 | End: 2017-05-30 | Stop reason: HOSPADM

## 2017-05-30 RX ORDER — ONDANSETRON 2 MG/ML
INJECTION INTRAMUSCULAR; INTRAVENOUS PRN
Status: DISCONTINUED | OUTPATIENT
Start: 2017-05-30 | End: 2017-05-30

## 2017-05-30 RX ADMIN — MIDAZOLAM HYDROCHLORIDE 2 MG: 1 INJECTION, SOLUTION INTRAMUSCULAR; INTRAVENOUS at 13:25

## 2017-05-30 RX ADMIN — ONDANSETRON 4 MG: 2 INJECTION INTRAMUSCULAR; INTRAVENOUS at 14:14

## 2017-05-30 RX ADMIN — PROPOFOL 150 MCG/KG/MIN: 10 INJECTION, EMULSION INTRAVENOUS at 13:35

## 2017-05-30 RX ADMIN — KETOROLAC TROMETHAMINE 30 MG: 30 INJECTION, SOLUTION INTRAMUSCULAR at 14:14

## 2017-05-30 RX ADMIN — LIDOCAINE HYDROCHLORIDE 60 MG: 20 INJECTION, SOLUTION INFILTRATION; PERINEURAL at 13:32

## 2017-05-30 RX ADMIN — SODIUM CHLORIDE, POTASSIUM CHLORIDE, SODIUM LACTATE AND CALCIUM CHLORIDE: 600; 310; 30; 20 INJECTION, SOLUTION INTRAVENOUS at 13:25

## 2017-05-30 RX ADMIN — BUPIVACAINE HYDROCHLORIDE 6.5 ML: 2.5 INJECTION, SOLUTION INFILTRATION; PERINEURAL at 14:02

## 2017-05-30 RX ADMIN — ACETAMINOPHEN 975 MG: 325 TABLET, FILM COATED ORAL at 12:08

## 2017-05-30 RX ADMIN — FENTANYL CITRATE 50 MCG: 50 INJECTION, SOLUTION INTRAMUSCULAR; INTRAVENOUS at 14:00

## 2017-05-30 RX ADMIN — PROPOFOL 50 MG: 10 INJECTION, EMULSION INTRAVENOUS at 13:35

## 2017-05-30 RX ADMIN — FENTANYL CITRATE 50 MCG: 50 INJECTION, SOLUTION INTRAMUSCULAR; INTRAVENOUS at 13:50

## 2017-05-30 NOTE — ANESTHESIA CARE TRANSFER NOTE
Patient: Loreta Roth    Procedure(s):  Wide Local CO2 Laser Of Vulva - Wound Class: II-Clean Contaminated    Diagnosis: MAURA III  Diagnosis Additional Information: No value filed.    Anesthesia Type:   MAC     Note:  Airway :Room Air  Patient transferred to:Phase II  Comments: In Phase II, VSS, no c/o pain or nausea or vomiting.  Exchanging well. Report to RN.       Vitals: (Last set prior to Anesthesia Care Transfer)    CRNA VITALS  5/30/2017 1354 - 5/30/2017 1451      5/30/2017             Pulse: 79    Ht Rate: 83    SpO2: 100 %                Electronically Signed By: LUCIAN Rosas CRNA  May 30, 2017  2:51 PM

## 2017-05-30 NOTE — OP NOTE
[facility]  Operative Note    Patient: Loerta Roth  : 1995  MRN: [id number]    Date of Service: 2017    Pre-operative Diagnosis:   1. MAURA 3  Post-operative Diagnosis: Same s/p below stated procedure  Procedure(s): Wide local excision of vulva, CO2 laser     Surgeon: Yaquelin Rebolledo MD   Resident: Jia Salinas MD, PGY1  Medical Student: Irvin Eduardo MS4    Anesthesia: MAC and local  EBL: 5 mL   Fluids: 700 mL crystalloid  Total Laser Time: 90 seconds    Specimens: Biopsy of left vulva near posterior fourchette  Complications: none    Findings: Normal external genitalia with a 3mm flat grey area at the posterior fourchette. 3cm area of acetowhite change at the posterior fourchette. No satellite lesions. Cervix without lesions, entire SCJ visualized with squamous metaplasia circumferentially with small satellite of squamous metaplasia at 4 o'clock. No lesions or acetowhite change of the vaginal side walls.    Indications: Loreta Roth is a 21 year old female who was found to have VIN3 on biopsy of a grey/white lesion noted at her posterior fourchette. A wide local excision with CO2 laser was recommended. Discussed risks, benefits, and alternatives to the procedure including risk of infection, bleeding, recurrence. The patient's questions were answered, understanding confirmed, and the patient signed written informed consent.    Technique: The patient was taken to the operating room where she was placed in the dorsal lithotomy position. MAC anesthesia was administered. EUA with colposcopy was performed for the above noted findings. The patient was prepped and draped in the usual sterile fashion, including wet sterile towels. The posterior fourchette was injected with 6.5mL of 0.25% marcaine. A scalpel was used to take a 1cm shave biopsy of the left posterior fourchette. The base of this biopsy was cauterized for hemostasis. The carbon dioxide laser power was set at 11 puentes. The laser was activated and the  areas of acetowhite change were systematically vaporized including a 5-10mm perimeter around the area. The lased skin was sloughed off to reveal healthy pink dermis below. In total, a butterfly-shaped area measuring approx 4-5cm across was lased. Silvadine cream was applied to the lased area and then a gauze fluff placed over the top.    The patient was repositioned into the dorsal supine position. The patient tolerated the procedure well and was transferred to the PACU in stable condition. Dr. Rebolledo was present and scrubbed for the entire procedure.     Jia Salinas MD  05/30/17 2:49 PM    I was present and scrubbed for the entire procedure.   Yaquelin Rebolledo

## 2017-05-30 NOTE — IP AVS SNAPSHOT
UR PREOP/PHASE II    [address] [place] [address]    Phone:  [phone]                                       After Visit Summary   5/30/2017    Loreta Roth    MRN: [id number]           After Visit Summary Signature Page     I have received my discharge instructions, and my questions have been answered. I have discussed any challenges I see with this plan with the nurse or doctor.    ..........................................................................................................................................  Patient/Patient Representative Signature      ..........................................................................................................................................  Patient Representative Print Name and Relationship to Patient    ..................................................               ................................................  Date                                            Time    ..........................................................................................................................................  Reviewed by Signature/Title    ...................................................              ..............................................  Date                                                            Time

## 2017-05-30 NOTE — BRIEF OP NOTE
[facility], [place]   Brief Operative Note   Name: Loreta Roth  MRN:[id number]  : 1995  Date of Surgery: 17    Pre-operative Diagnosis:   1. MAURA 3  Post-operative Diagnosis: Same s/p below stated procedure  Procedure(s): Wide local excision of vulva, CO2 laser    Surgeon: Yaquelin Rebolledo MD   Resident: Jia Salinas MD, PGY1  Medical Student: Irvin Eduardo MS4    Anesthesia: MAC and local  EBL: 5 mL   Fluids: 700 mL crystalloid  Total Laser Time: 90 seconds    Specimens: Biopsy of posterior fourchette  Complications: None apparent.  Findings: Normal external genitalia with a 3cm area of acetowhite change at the posterior fourchette. No satellite lesions. Cervix without lesions, entire SCJ visualized with squamous metaplasia circumferentially with small satellite of squamous metaplasia at 4 o'clock. No vaginal lesions or acetowhite change.  Jia Salinas MD  OB/GYN PGY1  2017, 2:26 PM

## 2017-05-30 NOTE — IP AVS SNAPSHOT
MRN:[id number]                      After Visit Summary   5/30/2017    Loreta Roth    MRN: [id number]           Thank you!     Thank you for choosing [facility] for your care. Our goal is always to provide you with excellent care. Hearing back from our patients is one way we can continue to improve our services. Please take a few minutes to complete the written survey that you may receive in the mail after you visit with us. Thank you!        Patient Information     Date Of Birth          1995        About your hospital stay     You were admitted on:  May 30, 2017 You last received care in the:  UR PREOP/PHASE II    You were discharged on:  May 30, 2017       Who to Call     For medical emergencies, please call 911.  For non-urgent questions about your medical care, please call your primary care provider or clinic, None  For questions related to your surgery, please call your surgery clinic        Attending Provider     Provider Yaquelin Chaudhry MD OB/Gyn       Primary Care Provider    [facility] Other Clinic      After Care Instructions     Discharge Instructions       Resume pre procedure diet            Discharge Instructions       Nothing in the vagina (no sex, tampons or douching) for 6 weeks.  Clean the area with water after using the bathroom. Apply a generous amount of silvadine cream to the area and then use a gauze fluff to help keep the area dry and facilitate healing.  You may take Sitz baths with plain warm water 3 times a day to aid in blood flow and   Do not drive while using narcotic pain medications     Call if you experience any of the following: fever >100.4F, redness or drainage from the incision, vaginal bleeding greater than 1 pad/hour, pain not relieved by pain medications, severe nausea and vomiting or foul-smelling vaginal discharge            Ice to affected area       PRN as tolerated                  Further instructions from your care team       Same-Day  Surgery   Adult Discharge Orders & Instructions     For 24 hours after surgery:  1. Get plenty of rest.  A responsible adult must stay with you for at least 24 hours after you leave the hospital.   2. Pain medication can slow your reflexes. Do not drive or use heavy equipment.  If you have weakness or tingling, don't drive or use heavy equipment until this feeling goes away.  3. Mixing alcohol and pain medication can cause dizziness and slow your breathing. It can even be fatal. Do not drink alcohol while taking pain medication.  4. Avoid strenuous or risky activities.  Ask for help when climbing stairs.   5. You may feel lightheaded.  If so, sit for a few minutes before standing.  Have someone help you get up.   6. If you have nausea (feel sick to your stomach), drink only clear liquids such as apple juice, ginger ale, broth or 7-Up.  Rest may also help.  Be sure to drink enough fluids.  Move to a regular diet as you feel able. Take pain medications with a small amount of solid food, such as toast or crackers, to avoid nausea.   7. A slight fever is normal. Call the doctor if your fever is over 100 F (37.7 C) (taken under the tongue) or lasts longer than 24 hours.  8. You may have a dry mouth, muscle aches, trouble sleeping or a sore throat.  These symptoms should go away after 24 hours.  9. Do not make important or legal decisions.   Pain Management:      1. Take pain medication (if prescribed) for pain as directed by your physician.        2. WARNING: If the pain medication you have been prescribed contains Tylenol  (acetaminophen), DO NOT take additional doses of Tylenol (acetaminophen).     Call your doctor for any of the followin.  Signs of infection (fever, growing tenderness at the surgery site, severe pain, a large amount of drainage or bleeding, foul-smelling drainage, redness, swelling).    2.  It has been over 8 to 10 hours since surgery and you are still not able to urinate (pee).    3.  Headache  "for over 24 hours.    4.  Numbness, tingling or weakness the day after surgery (if you had spinal anesthesia).  To contact a doctor, call _____________________________________ or:      [phone] and ask for the Resident On Call for:          __________________________________________ (answered 24 hours a day)      Emergency Department:  [facility]: [phone]  [facility]: [phone]               Rev. 10/2014       Pending Results     No orders found from 5/28/2017 to 5/31/2017.            Admission Information     Date & Time Provider Department Dept. Phone    5/30/2017 Yaquelin Rebolledo MD UR PREOP/PHASE -[phone]      Your Vitals Were     Blood Pressure Pulse Temperature Respirations Height Weight    127/89 68 97.7  F (36.5  C) (Oral) 16 1.651 m (5' 5\") 59.1 kg (130 lb 4.7 oz)    Last Period Pulse Oximetry BMI (Body Mass Index)             05/05/2017 (Exact Date) 100% 21.68 kg/m2         MyChart Information     [employer] gives you secure access to your electronic health record. If you see a primary care provider, you can also send messages to your care team and make appointments. If you have questions, please call your primary care clinic.  If you do not have a primary care provider, please call [phone] and they will assist you.        Care EveryWhere ID     This is your Care EveryWhere ID. This could be used by other organizations to access your [facility] medical records  [id number]           Review of your medicines      START taking        Dose / Directions    Combination Water Bottle Misc   Used for:  Post-operative state        Pericare squirt bottle. Use as instructed per post-op discharge instructions   Quantity:  1 each   Refills:  0       ibuprofen 600 MG tablet   Commonly known as:  ADVIL/MOTRIN   Used for:  Lesion of vulva        Dose:  600 mg   Take 1 tablet (600 mg) by mouth every 6 hours as needed for pain (mild)   Quantity:  30 tablet " "  Refills:  0       JUAN FLUFF SPONGE 6\"X6-3/4\" Misc   Used for:  Post-operative state        Use 1 four times a day PRN   Quantity:  20 each   Refills:  0       oxyCODONE-acetaminophen 5-325 MG per tablet   Commonly known as:  PERCOCET   Used for:  Lesion of vulva, Post-operative state        Dose:  1-2 tablet   Take 1-2 tablets by mouth every 4 hours as needed for pain (moderate to severe)   Quantity:  20 tablet   Refills:  0       silver sulfADIAZINE 1 % cream   Commonly known as:  SILVADENE   Used for:  Post-operative state        Apply a generous amount topically to perineum after washing the area. Cover with gauze fluff for 2 days to 1 week and then may use panty liner if desired.   Quantity:  50 g   Refills:  1         CONTINUE these medicines which have NOT CHANGED        Dose / Directions    ALEVE PO        Take by mouth as needed for moderate pain Reported on 3/17/2017   Refills:  0       conjugated estrogens cream   Commonly known as:  PREMARIN   Used for:  Lesion of vulva        Dose:  0.5 g   Place 0.5 g vaginally daily as needed Apply to area of tenderness at the posterior introitus daily as needed   Quantity:  30 g   Refills:  0       DAILY MULTIVITAMIN PO        Take by mouth daily   Refills:  0       VYVANSE 40 MG capsule   Generic drug:  lisdexamfetamine        Dose:  40 mg   40 mg every morning   Refills:  0            Where to get your medicines      These medications were sent to [facility] [place], [place] - [address]  [address] [place] [address], [facility] [address]     Phone:  [phone]     Combination Water Bottle Misc    ibuprofen 600 MG tablet    JUAN FLUFF SPONGE 6\"X6-3/4\" Misc         Some of these will need a paper prescription and others can be bought over the counter. Ask your nurse if you have questions.     Bring a paper prescription for each of these medications     oxyCODONE-acetaminophen 5-325 MG per tablet    silver sulfADIAZINE 1 % cream                " "Protect others around you: Learn how to safely use, store and throw away your medicines at www.disposemymeds.org.             Medication List: This is a list of all your medications and when to take them. Check marks below indicate your daily home schedule. Keep this list as a reference.      Medications           Morning Afternoon Evening Bedtime As Needed    ALEVE PO   Take by mouth as needed for moderate pain Reported on 3/17/2017                                Combination Water Bottle Misc   Pericare squirt bottle. Use as instructed per post-op discharge instructions                                conjugated estrogens cream   Commonly known as:  PREMARIN   Place 0.5 g vaginally daily as needed Apply to area of tenderness at the posterior introitus daily as needed                                DAILY MULTIVITAMIN PO   Take by mouth daily                                ibuprofen 600 MG tablet   Commonly known as:  ADVIL/MOTRIN   Take 1 tablet (600 mg) by mouth every 6 hours as needed for pain (mild)                                JUAN FLUFF SPONGE 6\"X6-3/4\" Misc   Use 1 four times a day PRN                                oxyCODONE-acetaminophen 5-325 MG per tablet   Commonly known as:  PERCOCET   Take 1-2 tablets by mouth every 4 hours as needed for pain (moderate to severe)                                silver sulfADIAZINE 1 % cream   Commonly known as:  SILVADENE   Apply a generous amount topically to perineum after washing the area. Cover with gauze fluff for 2 days to 1 week and then may use panty liner if desired.                                VYVANSE 40 MG capsule   40 mg every morning   Generic drug:  lisdexamfetamine                                  "

## 2017-05-30 NOTE — DISCHARGE INSTRUCTIONS
Same-Day Surgery   Adult Discharge Orders & Instructions     For 24 hours after surgery:  1. Get plenty of rest.  A responsible adult must stay with you for at least 24 hours after you leave the hospital.   2. Pain medication can slow your reflexes. Do not drive or use heavy equipment.  If you have weakness or tingling, don't drive or use heavy equipment until this feeling goes away.  3. Mixing alcohol and pain medication can cause dizziness and slow your breathing. It can even be fatal. Do not drink alcohol while taking pain medication.  4. Avoid strenuous or risky activities.  Ask for help when climbing stairs.   5. You may feel lightheaded.  If so, sit for a few minutes before standing.  Have someone help you get up.   6. If you have nausea (feel sick to your stomach), drink only clear liquids such as apple juice, ginger ale, broth or 7-Up.  Rest may also help.  Be sure to drink enough fluids.  Move to a regular diet as you feel able. Take pain medications with a small amount of solid food, such as toast or crackers, to avoid nausea.   7. A slight fever is normal. Call the doctor if your fever is over 100 F (37.7 C) (taken under the tongue) or lasts longer than 24 hours.  8. You may have a dry mouth, muscle aches, trouble sleeping or a sore throat.  These symptoms should go away after 24 hours.  9. Do not make important or legal decisions.   Pain Management:      1. Take pain medication (if prescribed) for pain as directed by your physician.        2. WARNING: If the pain medication you have been prescribed contains Tylenol  (acetaminophen), DO NOT take additional doses of Tylenol (acetaminophen).     Call your doctor for any of the followin.  Signs of infection (fever, growing tenderness at the surgery site, severe pain, a large amount of drainage or bleeding, foul-smelling drainage, redness, swelling).    2.  It has been over 8 to 10 hours since surgery and you are still not able to urinate (pee).    3.   Headache for over 24 hours.    4.  Numbness, tingling or weakness the day after surgery (if you had spinal anesthesia).  To contact a doctor, call _____________________________________ or:      [phone] and ask for the Resident On Call for:          __________________________________________ (answered 24 hours a day)      Emergency Department:  [facility]: [phone]  [facility]: [phone]               Rev. 10/2014

## 2017-05-30 NOTE — ANESTHESIA PREPROCEDURE EVALUATION
Anesthesia Evaluation     . Pt has not had prior anesthetic            ROS/MED HX    ENT/Pulmonary:  - neg pulmonary ROS     Neurologic:  - neg neurologic ROS     Cardiovascular:  - neg cardiovascular ROS       METS/Exercise Tolerance:     Hematologic:  - neg hematologic  ROS       Musculoskeletal:  - neg musculoskeletal ROS       GI/Hepatic:  - neg GI/hepatic ROS       Renal/Genitourinary:  - ROS Renal section negative       Endo:  - neg endo ROS       Psychiatric:     (+) psychiatric history other (comment)      Infectious Disease:  - neg infectious disease ROS       Malignancy:      - no malignancy   Other:                     Physical Exam  Normal systems: cardiovascular, pulmonary and dental    Airway   Mallampati: II  TM distance: >3 FB  Neck ROM: full    Dental     Cardiovascular       Pulmonary                     Anesthesia Plan      History & Physical Review  History and physical reviewed and following examination; no interval change.    ASA Status:  1 .        Plan for MAC Reason for MAC:  Deep or markedly invasive procedure (G8)  PONV prophylaxis:  Ondansetron (or other 5HT-3) and Dexamethasone or Solumedrol  Pt is vocalist    Try to avoid instrumenting the ariway.      Postoperative Care  Postoperative pain management:  IV analgesics and Oral pain medications.      Consents  Anesthetic plan, risks, benefits and alternatives discussed with:  Patient and Parent (Mother and/or Father).  Use of blood products discussed: No .   .                          .

## 2017-05-30 NOTE — ANESTHESIA POSTPROCEDURE EVALUATION
Patient: Loreta Roth    Procedure(s):  Wide Local CO2 Laser Of Vulva - Wound Class: II-Clean Contaminated    Diagnosis:MAURA III  Diagnosis Additional Information: No value filed.    Anesthesia Type:  No value filed.    Note:  Anesthesia Post Evaluation    Patient location during evaluation: PACU  Patient participation: Able to fully participate in evaluation  Level of consciousness: awake and alert  Pain management: adequate  Airway patency: patent  Respiratory status: acceptable  Hydration status: acceptable  PONV: none     Anesthetic complications: None          Last vitals:  Vitals:    05/30/17 1133   BP: 115/74   Pulse: 68   Resp: 20   Temp: 36.5  C (97.7  F)   SpO2: 100%         Electronically Signed By: Fabrice Carrillo MD, MD  May 30, 2017  1:23 PM

## 2017-06-01 LAB — COPATH REPORT: NORMAL

## 2017-06-08 ENCOUNTER — TELEPHONE (OUTPATIENT)
Dept: OBGYN | Facility: CLINIC | Age: 22
End: 2017-06-08

## 2017-06-08 NOTE — TELEPHONE ENCOUNTER
Reported pathology results to patient from vulvar biopsy at time of CO2 laser--chronic inflammation with NO dysplasia. She was very happy to hear this.  Some burning with urination but overall pain is tolerable. Discussed can separate labia and/or use squirt bottle when peeing to decrease/dilute urine contacting the raw area.    Still recommend f/u in 6mo to view area. Pt expressed understanding.    Jia Salinas MD  06/08/17 5:13 PM

## 2017-09-20 ENCOUNTER — PRE VISIT (OUTPATIENT)
Dept: OTOLARYNGOLOGY | Facility: CLINIC | Age: 22
End: 2017-09-20

## 2017-09-20 NOTE — TELEPHONE ENCOUNTER
1.  Date/reason for appt: 9/26/17 - voice strain/ hoarse    2.  Referring provider: Self    3.  Call to patient (Yes / No - short description): no    4.  Previous care at: [facility] and patient, no outside records

## 2017-09-26 ENCOUNTER — OFFICE VISIT (OUTPATIENT)
Dept: OTOLARYNGOLOGY | Facility: CLINIC | Age: 22
End: 2017-09-26

## 2017-09-26 DIAGNOSIS — R49.0 DYSPHONIA: Primary | ICD-10-CM

## 2017-09-26 DIAGNOSIS — J38.4 VOCAL FOLD EDEMA: ICD-10-CM

## 2017-09-26 ASSESSMENT — PAIN SCALES - GENERAL: PAINLEVEL: NO PAIN (0)

## 2017-09-26 NOTE — PROGRESS NOTES
HISTORY OF PRESENT ILLNESS: Loreta Roth is a 21 year old female with a history of frequent illnesses over the past several years. She notes that this starts when she started when she was a child. She would remember getting a viral illnesses up to 8 times per year. Only once when she cultured strep positive. These illnesses are often associated with swelling of her tonsils. With the infection she will get a voice change. Once the infection is resolved her voice will back to normal. She has started taking vitamin C her vitamins and feels as if the infections are getting less frequent and not as prolonged but still occur. She had an illness approximately 3 weeks ago she is recovering from this and wants to check her voice. She has 1 partner and did get human papilloma virus. She had a procedure in her genital area for removal of the papillomas. No oropharyngeal papillomas have been visualized. She has no swallowing difficulties no breathing difficulties.      PAST MEDICAL HISTORY:   Past Medical History:   Diagnosis Date     ADHD (attention deficit hyperactivity disorder)      Hoarseness 9/06/17    Came with severe sore throat     MAURA III (vulvar intraepithelial neoplasia III)        PAST SURGICAL HISTORY:   Past Surgical History:   Procedure Laterality Date     LASER CO2 VULVA N/A 5/30/2017    Procedure: LASER CO2 VULVA;  Wide Local CO2 Laser Of Vulva;  Surgeon: Yaquelin Rebolledo MD;  Location: [facility] OR       FAMILY HISTORY:   Family History   Problem Relation Age of Onset     Hypertension Father      Anxiety Disorder Father      Depression Father      DIABETES Father      Type 1     Obesity Father      CEREBROVASCULAR DISEASE Father      Hypertension Paternal Grandmother      Depression Paternal Grandmother      Hypertension Paternal Grandfather      MENTAL ILLNESS Paternal Grandfather      schizophrenia     Obesity Paternal Grandfather      MENTAL ILLNESS Paternal Half-Sister      MENTAL ILLNESS Other       paranoid schizophrenia     MENTAL ILLNESS Maternal Grandmother      Substance Abuse Maternal Grandfather      Alcohol     Depression Maternal Grandfather      Depression Mother      Depression Paternal Half-Sister      DIABETES Other      Type 1     Obesity Paternal Half-Sister      Obesity Cousin        SOCIAL HISTORY:   Social History   Substance Use Topics     Smoking status: Never Smoker     Smokeless tobacco: Never Used     Alcohol use Yes      Comment: Occasional       REVIEW OF SYSTEMS: Ten point review of systems was performed and is negative except for what is noted in the HPI and PMH.     ALLERGIES: Penicillins    MEDICATIONS:   Current Outpatient Prescriptions   Medication Sig Dispense Refill     Multiple Vitamins-Minerals (DAILY MULTIVITAMIN PO) Take by mouth daily        lisdexamfetamine (VYVANSE) 40 MG capsule 40 mg every morning          PHYSICAL EXAMINATION:  She  is awake, alert and in no apparent distress.    Her tympanic membranes are clear and intact bilaterally. External auditory canals are clear.  Nasal exam shows a mild septal deviation without obstruction.  Examination of the oral cavity shows no suspicious lesions.  There is symmetric movement of the tongue and soft palate.    The oropharynx is clear. The tonsils are mildly enlarged but minimally inflamed.  Her neck is supple without significant adenopathy.  Pulse is regular.  Upper airway is clear.  Cranial nerves II-XII are grossly intact.       PROCEDURE: A flexible laryngoscopy  was performed by the speech language pathologist and reviewed by myself..  Informed consent was obtained and a time out was performed. 3% lidocaine and 0.25% phenylephrine was sprayed into the nasal cavity and allowed 3 to 5 minutes for effect.Examination showed the vocal folds to be mobile and meet in the midline.  No nodules, polyps or ulcerations are seen. There is some very mild fullness of the left vocal fold which gave the appearance of a sulcus or  pseudosulcus which was very mild.  There was also a very mild irregularity of the left vocal fold margin that was intermittently seen.  There is minimal to no inflammation or erythema of the supraglottic or glottic larynx except at the posterior commissure that was mild..  With phonation during speech there is mild contraction of the supraglottic larynx. With singing this contracture largely resolved.   The hypopharynx is otherwise clear as is the subglottis.           Left  Vocal fold findings.        IMPRESSION: Resolving URI with residual laryngeal edema.  Frequent URI by history with associated laryngitis.    PLAN: Trial of speech therapy to assist vocal recovery and develop strategies to reduce vocal consequences of frequent URI.  RTC prn.

## 2017-09-26 NOTE — LETTER
9/26/2017       RE: Loreta Roth  [address] [place] [facility] [address]     Dear Colleague,    Thank you for referring your patient, Loreta Roth, to the [facility]. Please see a copy of my visit note below.    [facility]  Haroldo Thomas Jr., M.D., F.A.C.S.  Rhea Ojeda M.D., M.P.H.  Rosario Abrams, Ph.D., CCC/SLP  Kathrin Lujan M.M. (voice), M.A., CCC/SLP  Bud Waters M.M. (voice), MERIC., CCC/SLP    Patient: Loreta Roth  Date of Visit: 9/26/2017    CHIEF COMPLAINT: Vocal Fatigue / Hoarseness, Consistent tonsil swelling    HISTORY  Loreta Roth was seen for initial voice evaluation and laryngeal examination in conjunction with a visit to Dr. Thomas.  Please refer to Dr. Thomas s dictation for a more complete history and impressions. Salient details of her history are as follows:    Ms. Roth is a 21 year old maldonado with a history of vocal fatigue, hoarseness, and persistent tonsil swelling.    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     OTHER PERTINENT  "HISTORY    Unremarkable    Healthy  Past Medical History:   Diagnosis Date     ADHD (attention deficit hyperactivity disorder)      MAURA III (vulvar intraepithelial neoplasia III)      Past Surgical History:   Procedure Laterality Date     LASER CO2 VULVA N/A 2017    Procedure: LASER CO2 VULVA;  Wide Local CO2 Laser Of Vulva;  Surgeon: Yaquelin Rebolledo MD;  Location: [facility] OR       OBJECTIVE  PATIENT REPORTED MEASURES    Effort to talk: 3 / 10 (0-10 in which 10 represents maximal effort)    Effort to sin / 10 (0-10 in which 10 represents maximal effort)    Voice quality: 6 / 10 (0-10 in which 10 represents best possible voice)     VPC Mean: 3.62 /4 (higher score indicates greater self-efficacy for change)    Patient Supplied Answers To VHI Questionnaire  Voice Handicap Index (VHI-10) 2017   How often do you have any of the following symptoms:  Indigestion, heartburn, chest pain, stomach acid coming up, and/or tasting acid in your mouth or throat? Rarely   (F1) My voice makes it difficult for people to hear me. Never   (F2) People have difficulty understanding me in a noisy room. Sometimes   (F8) My voice difficulties restrict my personal and social life. Sometimes   (F9) I feel left out of conversations because of my voice. Never   (F10) My voice problem causes me to lose income. Always   (P5) I feel as though I have to strain to produce voice. Sometimes   (P6) The clarity of my voice is unpredictable. Sometimes   (E4) My voice problem upsets me. Always   (E6) My voice makes me feel handicapped. Sometimes   (P3) People ask, \"What's wrong with your voice?\" Almost always   VHI Total Score 21     Patient Supplied Answers To CSI Questionnaire  Cough Severity Index (CSI) 2017   My cough is worse when I lie down. Sometimes   My coughing problem causes me to restrict my personal and social life. Never   I tend to avoid places because of my cough problem. Never   I feel embarrassed because of my coughing " "problem. Never   People ask, \"What's wrong?\" because I cough a lot. Never   I run out of air when I cough. Sometimes   My coughing problem affects my voice. Sometimes   My coughing problem limits my physical activity. Sometimes   My coughing problem upsets me. Never   People ask me if I am sick because I cough a lot. Never   CSI Total Score 8     Patient Supplied Answers To EAT Questionnaire  Eating Assessment Tool (EAT-10) 9/25/2017   My swallowing problem has caused me to lose weight. 0   My swallowing problem interferes with my ability to go out for meals. 0   Swallowing solids takes extra effort. 0   Swallowing pills takes extra effort. 0   Swallowing is painful. 2   The pleasure of eating is affected by my swallowing. 0   When I swallow food sticks in my throat. 0   I cough when I eat. 0   Swallowing is stressful. 0   How often do things go down the wrong way when you swallow? Rarely   Have you had pneumonia, bronchitis, or another severe lung infection in the last 6 months? No   EAT Total Score 2     PERCEPTUAL EVALUATION (CPT 11112)  POSTURE / TENSION:     no overt tension visible    BREATHING:     appears within normal limits and adequate     LARYNGEAL PALPATION:     supple musculature    no significant tenderness    VOICE:    Roughness: Mild Intermittent during running speech    Breathiness: Mild Consistent during singing    Strain: Mild Intermittent during singing    Loudness    Conversational speech:  WNL    Projected speech:  WNL    Pitch:    Conversational speech:  WNL    Pitch glide: clear with excellent range extension and no obvious breaks    Resonance:    Conversational speech: occasional laryngeal pharyngeal resonance during running speech    Singing vs. Speech:  Reduced roughness, and increased breathiness during sustained phonation    CAPE-V Overall Severity:  17/100    COUGH/THROAT CLEARING:    Not observed    THERAPY PROBES: Improvement was elicited with use of forward resonant stimuli and " coordination of respiration and phonation    LARYNGEAL EXAMINATION (60087)  Procedure: Flexible endoscopy with chip-tip technology with stroboscopy, right nostril; topical anesthesia with 3% Lidocaine and 0.25% phenylephrine was applied.   Performed by: Clinton Waters M.M., MDaveA., CCC/SLP  Verbal consent was obtained and witnessed prior to this procedure.   This exam shows:    Laryngeal Mucosa: essentially healthy laryngeal mucosa, small area of irritation at the posterior commissure    Secretions:  minimal presence of thickened secretions on the vocal folds and throughout the laryngeal area    Vocal fold mucosa:    o RTVF - within normal limits, no visible lesions  o LTVF - subtle area of edema mid-membranously with slight edge contour irregularity on stroboscopy  otherwise within normal limits, with no lesions    Vocal fold function:   o Vocal folds are mobile and meet at midline  o Movement is brisk and symmetric  o Exam is neurologically normal     Narrow Band Imaging (NBI) demonstrated: No additional information     Airway is adequate    elongation of the vocal folds for pitch increase is normal    Mild four-way constriction of the supraglottic larynx during connected speech    Therapy probes show reduced hyperfunction with forward resonant stimuli    The addition of stroboscopy provided the following information:   o Amplitude:     LVF - Mildly reduced    RVF - WNL  o Mucosal Wave:    LVF - Mildly reduced near the mid membranous juncture    RVF - WNL  o Glottic closure:  Subtly irregular but complete  o Symmetry:  Intermittent asymmetry  o Periodicity: periodic      Supraglottic hyperfunction during running speech          Subtle fullness of left mid membranous vocal fold    The laryngeal exam was reviewed with Ms. Roth, and I provided pertinent explanations, as well as written and oral information.    ASSESSMENT / PLAN  IMPRESSIONS: Ms. Roth presents today R49.0 (Dysphonia) in the context of an imbalance in  function of the intrinsic and extrinsic muscles of the larynx, J38.4 (Vocal fold Edema), and lingering recovery from the most recent in a long series of URIs. Given her history of HPV thorough evaluation of the laryngeal anatomy was warranted and no evidence of papilloma was visualized.  Functionally she demonstrates mild supraglottic hyperfunction during running speech, and subtly reduced mucosal pliability of the left vocal fold, which account for patient's description of loss of ease and clarity in upper registration.    STIMULABILITY: results of therapy probes during perceptual and laryngeal evaluation demonstrate improvement with use of forward resonant stimuli and coordination of respiration and phonation     RECOMMENDATIONS:     A short course of speech therapy is recommended to optimize vocal technique and promote reduced discomfort, effort and fatigue.    She demonstrates a Excellent prognosis for improvement given adherence to therapeutic recommendations. Therapeutic     Positive indicators: positive response to therapy probes diagnosis is known to respond to treatment good awareness of patterns of use Internal locus of control    Negative indicators: none    DURATION / FREQUENCY: 3 bi-weekly one-hour sessions with 1 monthly follow-up    GOALS:  Patient goal:   1. To improve and maintain a healthy voice quality  2. To understand the problem and fix it as much as possible    Short-term goal(s): Within the first 4 sessions, Ms. Roth:  1. will be able to independently list key factors in maintenance of good vocal hygiene with 80% accuracy, and report on their use outside the therapy room.  2. will speak into expiratory reserve volume no more than 1 time(s) during a two minute conversation.  3. will demonstrate semi-occluded vocal tract (SOVT) exercises with at least 80% accuracy with no clinician support  4. will accurately identify target vs. habitual voice quality during therapy tasks in 4 out of 5 trials  with no clinician support  5. will demonstrate the ability to alternate between target and habitual voice quality given clinician cue 75% of the time during therapy tasks    Long-term goal(s): In 6 months, Ms. Roth will:  1. Report a week of typical activities, in which dysphonia does not exceed a level of 1 out of 10, 80% of the time    This treatment plan was developed with the patient who agreed with the recommendations.    PRIMARY ICD-10 code:  R49.0 (Dysphonia)   SECONDARY ICD-10 code: J38.4 (Vocal Fold Edema)    TOTAL SERVICE TIME: 60 minutes  EVALUATION OF VOICE AND RESONANCE: (40498): 60 minutes    NO CHARGE FACILITY FEE (38306)    Clinton Waters M.M., M.A., CCC-SLP  Speech-Language Pathologist  Certificate of Vocology  [phone]      Again, thank you for allowing me to participate in the care of your patient.      Sincerely,    Bud Waters, SLP

## 2017-09-26 NOTE — PROGRESS NOTES
[facility] VOICE CLINIC  Haroldo Thomas Jr., M.D., F.A.C.S.  Rhea Ojeda M.D., M.P.H.  Rosario Abrams, Ph.D., CCC/SLP  Kathrin Lujan M.M. (voice), M.A., CCC/SLP  Bud Waters M.M. (voice), M.GEOVANY., [facility]SLP    Patient: Loreta Roth  Date of Visit: 9/26/2017    CHIEF COMPLAINT: Vocal Fatigue / Hoarseness, Consistent tonsil swelling    HISTORY  Loreta Roth was seen for initial voice evaluation and laryngeal examination in conjunction with a visit to Dr. Thomas.  Please refer to Dr. Thomas s dictation for a more complete history and impressions. Salient details of her history are as follows:    Ms. Roth is a 21 year old maldonado with a history of vocal fatigue, hoarseness, and persistent tonsil swelling.    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     OTHER PERTINENT HISTORY    Unremarkable    Healthy  Past Medical History:   Diagnosis Date     ADHD (attention deficit hyperactivity disorder)      MAURA III (vulvar intraepithelial neoplasia III)      Past Surgical History:   Procedure Laterality Date     LASER CO2 VULVA N/A 5/30/2017     "Procedure: LASER CO2 VULVA;  Wide Local CO2 Laser Of Vulva;  Surgeon: Yaquelin Rebolledo MD;  Location: [facility] OR       OBJECTIVE  PATIENT REPORTED MEASURES    Effort to talk: 3 / 10 (0-10 in which 10 represents maximal effort)    Effort to sin / 10 (0-10 in which 10 represents maximal effort)    Voice quality: 6 / 10 (0-10 in which 10 represents best possible voice)     VPC Mean: 3.62 /4 (higher score indicates greater self-efficacy for change)    Patient Supplied Answers To VHI Questionnaire  Voice Handicap Index (VHI-10) 2017   How often do you have any of the following symptoms:  Indigestion, heartburn, chest pain, stomach acid coming up, and/or tasting acid in your mouth or throat? Rarely   (F1) My voice makes it difficult for people to hear me. Never   (F2) People have difficulty understanding me in a noisy room. Sometimes   (F8) My voice difficulties restrict my personal and social life. Sometimes   (F9) I feel left out of conversations because of my voice. Never   (F10) My voice problem causes me to lose income. Always   (P5) I feel as though I have to strain to produce voice. Sometimes   (P6) The clarity of my voice is unpredictable. Sometimes   (E4) My voice problem upsets me. Always   (E6) My voice makes me feel handicapped. Sometimes   (P3) People ask, \"What's wrong with your voice?\" Almost always   VHI Total Score 21     Patient Supplied Answers To CSI Questionnaire  Cough Severity Index (CSI) 2017   My cough is worse when I lie down. Sometimes   My coughing problem causes me to restrict my personal and social life. Never   I tend to avoid places because of my cough problem. Never   I feel embarrassed because of my coughing problem. Never   People ask, \"What's wrong?\" because I cough a lot. Never   I run out of air when I cough. Sometimes   My coughing problem affects my voice. Sometimes   My coughing problem limits my physical activity. Sometimes   My coughing problem upsets me. Never "   People ask me if I am sick because I cough a lot. Never   CSI Total Score 8     Patient Supplied Answers To EAT Questionnaire  Eating Assessment Tool (EAT-10) 9/25/2017   My swallowing problem has caused me to lose weight. 0   My swallowing problem interferes with my ability to go out for meals. 0   Swallowing solids takes extra effort. 0   Swallowing pills takes extra effort. 0   Swallowing is painful. 2   The pleasure of eating is affected by my swallowing. 0   When I swallow food sticks in my throat. 0   I cough when I eat. 0   Swallowing is stressful. 0   How often do things go down the wrong way when you swallow? Rarely   Have you had pneumonia, bronchitis, or another severe lung infection in the last 6 months? No   EAT Total Score 2     PERCEPTUAL EVALUATION (CPT 87289)  POSTURE / TENSION:     no overt tension visible    BREATHING:     appears within normal limits and adequate     LARYNGEAL PALPATION:     supple musculature    no significant tenderness    VOICE:    Roughness: Mild Intermittent during running speech    Breathiness: Mild Consistent during singing    Strain: Mild Intermittent during singing    Loudness    Conversational speech:  WNL    Projected speech:  WNL    Pitch:    Conversational speech:  WNL    Pitch glide: clear with excellent range extension and no obvious breaks    Resonance:    Conversational speech: occasional laryngeal pharyngeal resonance during running speech    Singing vs. Speech:  Reduced roughness, and increased breathiness during sustained phonation    CAPE-V Overall Severity:  17/100    COUGH/THROAT CLEARING:    Not observed    THERAPY PROBES: Improvement was elicited with use of forward resonant stimuli and coordination of respiration and phonation    LARYNGEAL EXAMINATION (91935)  Procedure: Flexible endoscopy with chip-tip technology with stroboscopy, right nostril; topical anesthesia with 3% Lidocaine and 0.25% phenylephrine was applied.   Performed by: Clinton Waters  KIKO HAYNES, CCC/SLP  Verbal consent was obtained and witnessed prior to this procedure.   This exam shows:    Laryngeal Mucosa: essentially healthy laryngeal mucosa, small area of irritation at the posterior commissure    Secretions:  minimal presence of thickened secretions on the vocal folds and throughout the laryngeal area    Vocal fold mucosa:    o RTVF - within normal limits, no visible lesions  o LTVF - subtle area of edema mid-membranously with slight edge contour irregularity on stroboscopy  otherwise within normal limits, with no lesions    Vocal fold function:   o Vocal folds are mobile and meet at midline  o Movement is brisk and symmetric  o Exam is neurologically normal     Narrow Band Imaging (NBI) demonstrated: No additional information     Airway is adequate    elongation of the vocal folds for pitch increase is normal    Mild four-way constriction of the supraglottic larynx during connected speech    Therapy probes show reduced hyperfunction with forward resonant stimuli    The addition of stroboscopy provided the following information:   o Amplitude:     LVF - Mildly reduced    RVF - WNL  o Mucosal Wave:    LVF - Mildly reduced near the mid membranous juncture    RVF - WNL  o Glottic closure:  Subtly irregular but complete  o Symmetry:  Intermittent asymmetry  o Periodicity: periodic      Supraglottic hyperfunction during running speech          Subtle fullness of left mid membranous vocal fold    The laryngeal exam was reviewed with Ms. Roth, and I provided pertinent explanations, as well as written and oral information.    ASSESSMENT / PLAN  IMPRESSIONS: Ms. Roth presents today R49.0 (Dysphonia) in the context of an imbalance in function of the intrinsic and extrinsic muscles of the larynx, J38.4 (Vocal fold Edema), and lingering recovery from the most recent in a long series of URIs. Given her history of HPV thorough evaluation of the laryngeal anatomy was warranted and no evidence of papilloma  was visualized.  Functionally she demonstrates mild supraglottic hyperfunction during running speech, and subtly reduced mucosal pliability of the left vocal fold, which account for patient's description of loss of ease and clarity in upper registration.    STIMULABILITY: results of therapy probes during perceptual and laryngeal evaluation demonstrate improvement with use of forward resonant stimuli and coordination of respiration and phonation     RECOMMENDATIONS:     A short course of speech therapy is recommended to optimize vocal technique and promote reduced discomfort, effort and fatigue.    She demonstrates a Excellent prognosis for improvement given adherence to therapeutic recommendations. Therapeutic     Positive indicators: positive response to therapy probes diagnosis is known to respond to treatment good awareness of patterns of use Internal locus of control    Negative indicators: none    DURATION / FREQUENCY: 3 bi-weekly one-hour sessions with 1 monthly follow-up    GOALS:  Patient goal:   1. To improve and maintain a healthy voice quality  2. To understand the problem and fix it as much as possible    Short-term goal(s): Within the first 4 sessions, Ms. Roth:  1. will be able to independently list key factors in maintenance of good vocal hygiene with 80% accuracy, and report on their use outside the therapy room.  2. will speak into expiratory reserve volume no more than 1 time(s) during a two minute conversation.  3. will demonstrate semi-occluded vocal tract (SOVT) exercises with at least 80% accuracy with no clinician support  4. will accurately identify target vs. habitual voice quality during therapy tasks in 4 out of 5 trials with no clinician support  5. will demonstrate the ability to alternate between target and habitual voice quality given clinician cue 75% of the time during therapy tasks    Long-term goal(s): In 6 months, Ms. Roth will:  1. Report a week of typical activities, in which  dysphonia does not exceed a level of 1 out of 10, 80% of the time    This treatment plan was developed with the patient who agreed with the recommendations.    PRIMARY ICD-10 code:  R49.0 (Dysphonia)   SECONDARY ICD-10 code: J38.4 (Vocal Fold Edema)    TOTAL SERVICE TIME: 60 minutes  EVALUATION OF VOICE AND RESONANCE: (27255): 30 minutes    ENDOSCOPIC LARYNGEAL EXAMINATION WITH STROBOSCOPY (71537): 30 minutes  NO CHARGE FACILITY FEE (72840)    Clinton Waters M.M., M.A., CCC-SLP  Speech-Language Pathologist  Certificate of Vocology  [phone]

## 2017-09-26 NOTE — PATIENT INSTRUCTIONS
1.  You were seen in the ENT Clinic today by Dr. Thomas.  If you have any questions or concerns after your appointment, please call [phone].  Press option #1 for scheduling related needs.  Press option #3 for Nurse advice.    Olinda CAMARILLON, RN  [facility] ENT   Head & Neck Surgery

## 2017-09-26 NOTE — LETTER
9/26/2017       RE: Loreta Roth  [address] [place] [facility] [address]     Dear Colleague,    Thank you for referring your patient, Loreta Roth, to the [facility] EAR NOSE AND THROAT at [facility]. Please see a copy of my visit note below.        HISTORY OF PRESENT ILLNESS: Loreta Roth is a 21 year old female with a history of frequent illnesses over the past several years. She notes that this starts when she started when she was a child. She would remember getting a viral illnesses up to 8 times per year. Only once when she cultured strep positive. These illnesses are often associated with swelling of her tonsils. With the infection she will get a voice change. Once the infection is resolved her voice will back to normal. She has started taking vitamin C her vitamins and feels as if the infections are getting less frequent and not as prolonged but still occur. She had an illness approximately 3 weeks ago she is recovering from this and wants to check her voice. She has 1 partner and did get human papilloma virus. She had a procedure in her genital area for removal of the papillomas. No oropharyngeal papillomas have been visualized. She has no swallowing difficulties no breathing difficulties.      PAST MEDICAL HISTORY:   Past Medical History:   Diagnosis Date     ADHD (attention deficit hyperactivity disorder)      Hoarseness 9/06/17    Came with severe sore throat     MAURA III (vulvar intraepithelial neoplasia III)        PAST SURGICAL HISTORY:   Past Surgical History:   Procedure Laterality Date     LASER CO2 VULVA N/A 5/30/2017    Procedure: LASER CO2 VULVA;  Wide Local CO2 Laser Of Vulva;  Surgeon: Yaquelin Rebolledo MD;  Location:  OR       FAMILY HISTORY:   Family History   Problem Relation Age of Onset     Hypertension Father      Anxiety Disorder Father      Depression Father      DIABETES Father      Type 1     Obesity Father      CEREBROVASCULAR DISEASE  Father      Hypertension Paternal Grandmother      Depression Paternal Grandmother      Hypertension Paternal Grandfather      MENTAL ILLNESS Paternal Grandfather      schizophrenia     Obesity Paternal Grandfather      MENTAL ILLNESS Paternal Half-Sister      MENTAL ILLNESS Other      paranoid schizophrenia     MENTAL ILLNESS Maternal Grandmother      Substance Abuse Maternal Grandfather      Alcohol     Depression Maternal Grandfather      Depression Mother      Depression Paternal Half-Sister      DIABETES Other      Type 1     Obesity Paternal Half-Sister      Obesity Cousin        SOCIAL HISTORY:   Social History   Substance Use Topics     Smoking status: Never Smoker     Smokeless tobacco: Never Used     Alcohol use Yes      Comment: Occasional       REVIEW OF SYSTEMS: Ten point review of systems was performed and is negative except for what is noted in the HPI and PMH.     ALLERGIES: Penicillins    MEDICATIONS:   Current Outpatient Prescriptions   Medication Sig Dispense Refill     Multiple Vitamins-Minerals (DAILY MULTIVITAMIN PO) Take by mouth daily        lisdexamfetamine (VYVANSE) 40 MG capsule 40 mg every morning          PHYSICAL EXAMINATION:  She  is awake, alert and in no apparent distress.    Her tympanic membranes are clear and intact bilaterally. External auditory canals are clear.  Nasal exam shows a mild septal deviation without obstruction.  Examination of the oral cavity shows no suspicious lesions.  There is symmetric movement of the tongue and soft palate.    The oropharynx is clear. The tonsils are mildly enlarged but minimally inflamed.  Her neck is supple without significant adenopathy.  Pulse is regular.  Upper airway is clear.  Cranial nerves II-XII are grossly intact.       PROCEDURE: A flexible laryngoscopy  was performed by the speech language pathologist and reviewed by myself..  Informed consent was obtained and a time out was performed. 3% lidocaine and 0.25% phenylephrine was  sprayed into the nasal cavity and allowed 3 to 5 minutes for effect.Examination showed the vocal folds to be mobile and meet in the midline.  No nodules, polyps or ulcerations are seen. There is some very mild fullness of the left vocal fold which gave the appearance of a sulcus or pseudosulcus which was very mild.  There was also a very mild irregularity of the left vocal fold margin that was intermittently seen.  There is minimal to no inflammation or erythema of the supraglottic or glottic larynx except at the posterior commissure that was mild..  With phonation during speech there is mild contraction of the supraglottic larynx. With singing this contracture largely resolved.   The hypopharynx is otherwise clear as is the subglottis.           Left  Vocal fold findings.        IMPRESSION: Resolving URI with residual laryngeal edema.  Frequent URI by history with associated laryngitis.    PLAN: Trial of speech therapy to assist vocal recovery and develop strategies to reduce vocal consequences of frequent URI.  RTC prn.    Again, thank you for allowing me to participate in the care of your patient.      Sincerely,    Haroldo Thomas MD

## 2017-09-26 NOTE — MR AVS SNAPSHOT
After Visit Summary   9/26/2017    Loreta Roth    MRN: [id number]           Patient Information     Date Of Birth          1995        Visit Information        Provider Department      9/26/2017 11:30 AM Bud Waters SLP [facility] Voice        Today's Diagnoses     Dysphonia    -  1    Vocal fold edema           Follow-ups after your visit        Who to contact     Please call your clinic at [phone] to:    Ask questions about your health    Make or cancel appointments    Discuss your medicines    Learn about your test results    Speak to your doctor   If you have compliments or concerns about an experience at your clinic, or if you wish to file a complaint, please contact [facility] Patient Relations at [phone] or email us at [email or web address]         Additional Information About Your Visit        MyChart Information     MECLUBt gives you secure access to your electronic health record. If you see a primary care provider, you can also send messages to your care team and make appointments. If you have questions, please call your primary care clinic.  If you do not have a primary care provider, please call [phone] and they will assist you.      Make My plate is an electronic gateway that provides easy, online access to your medical records. With Make My plate, you can request a clinic appointment, read your test results, renew a prescription or communicate with your care team.     To access your existing account, please contact your [facility] or call [phone] for assistance.        Care EveryWhere ID     This is your Care EveryWhere ID. This could be used by other organizations to access your [facility] medical records  [id number]         Blood Pressure from Last 3 Encounters:   05/30/17 117/82   05/11/17 129/89   03/31/17 116/72    Weight from Last 3 Encounters:   05/30/17 59.1 kg (130 lb 4.7 oz)   05/11/17 58.5 kg  (129 lb)   03/31/17 60.4 kg (133 lb 3.2 oz)              We Performed the Following     C BEHAVIORAL & QUALITATIVE ANALYSIS VOICE AND RESONANCE        Primary Care Provider    None Specified       No primary provider on file.        Equal Access to Services     NACHODWIGHT LINUS : Hademily bandar caceres evangelina Schaffer, waalessioda luqadaha, qacucata kaalmada sylvia, cj valdez herminionikolai concepcion laSparkledwight katz. So [facility] [phone].    ATENCIÓN: Si habla español, tiene a spann disposición servicios gratuitos de asistencia lingüística. Llame al [phone].    We comply with applicable federal civil rights laws and [place] laws. We do not discriminate on the basis of race, color, national origin, age, disability sex, sexual orientation or gender identity.            Thank you!     Thank you for choosing [facility]  for your care. Our goal is always to provide you with excellent care. Hearing back from our patients is one way we can continue to improve our services. Please take a few minutes to complete the written survey that you may receive in the mail after your visit with us. Thank you!             Your Updated Medication List - Protect others around you: Learn how to safely use, store and throw away your medicines at www.disposemymeds.org.          This list is accurate as of: 9/26/17 11:59 PM.  Always use your most recent med list.                   Brand Name Dispense Instructions for use Diagnosis    DAILY MULTIVITAMIN PO      Take by mouth daily        VYVANSE 40 MG capsule   Generic drug:  lisdexamfetamine      40 mg every morning

## 2017-09-26 NOTE — MR AVS SNAPSHOT
After Visit Summary   9/26/2017    Loreta Roth    MRN: [id number]           Patient Information     Date Of Birth          1995        Visit Information        Provider Department      9/26/2017 11:30 AM Haroldo Thomas MD [facility] Ear Nose and Throat        Today's Diagnoses     Dysphonia    -  1      Care Instructions    1.  You were seen in the ENT Clinic today by Dr. Thomas.  If you have any questions or concerns after your appointment, please call [phone].  Press option #1 for scheduling related needs.  Press option #3 for Nurse advice.    Olinda CAMARILLON, RN  [facility]   Head & Neck Surgery                 Follow-ups after your visit        Additional Services     [facility] REFERRAL       SPEECH-LANGUAGE PATHOLOGY SERVICE(S) REQUESTED:  Evaluate and treat  Laryngoscopy; add Stroboscopy as needed for assessment of vibratory characteristics of vocal fold mucosa in evaluation of dysphonia; add Flexible Endoscopic Evaluation of Swallowing as needed for evaluation of swallow when dysphagia is suspected; if patient is unable to tolerate laryngoscopy, may use 3% lidocaine/0.25% phenylephrine or 20% oral anesthetic benzocaine as needed, but not for evaluation of swallow.    Rosario Abrams, Ph.D., CCC/SLP  Speech-Language Pathologist  Director, [facility]  [phone]    Kathrin Lujan M.M., M.A., CCC/SLP  Speech-Language Pathologist  [facility]  [phone]    Allison Alpers, M.A., CCC/SLP  Speech-Language Pathologist  [facility]    Clinton Waters M.M., M.A., CCC/SLP  Speech-Language Pathologist  [facility]                  Follow-up notes from your care team     Return if symptoms worsen or fail to improve.      Who to contact     Please call your clinic at [phone] to:    Ask questions about your health    Make or cancel appointments    Discuss your medicines    Learn about your test results    Speak to  your doctor   If you have compliments or concerns about an experience at your clinic, or if you wish to file a complaint, please contact [facility] Patient Relations at [phone] or email us at [email or web address]         Additional Information About Your Visit        MyChart Information     [employer] gives you secure access to your electronic health record. If you see a primary care provider, you can also send messages to your care team and make appointments. If you have questions, please call your primary care clinic.  If you do not have a primary care provider, please call [phone] and they will assist you.      [employer] is an electronic gateway that provides easy, online access to your medical records. With [employer], you can request a clinic appointment, read your test results, renew a prescription or communicate with your care team.     To access your existing account, please contact your [facility] or call [phone] for assistance.        Care EveryWhere ID     This is your Care EveryWhere ID. This could be used by other organizations to access your [place] medical records  [id number]         Blood Pressure from Last 3 Encounters:   05/30/17 117/82   05/11/17 129/89   03/31/17 116/72    Weight from Last 3 Encounters:   05/30/17 59.1 kg (130 lb 4.7 oz)   05/11/17 58.5 kg (129 lb)   03/31/17 60.4 kg (133 lb 3.2 oz)              We Performed the Following     IMAGESTREAM RECORDING ORDER     [facility]        Primary Care Provider    None Specified       No primary provider on file.        Equal Access to Services     ELISHA BARRIGA : Hadii aad ku hadasho Soomaali, waaxda luqadaha, qaybta kaalmada adeegyada, cj winkler . So [facility] [phone].    ATENCIÓN: Si habla español, tiene a spann disposición servicios gratuitos de asistencia lingüística. Llame al [phone].    We comply with applicable  federal civil rights laws and [place] laws. We do not discriminate on the basis of race, color, national origin, age, disability sex, sexual orientation or gender identity.            Thank you!     Thank you for choosing [facility] EAR NOSE AND THROAT  for your care. Our goal is always to provide you with excellent care. Hearing back from our patients is one way we can continue to improve our services. Please take a few minutes to complete the written survey that you may receive in the mail after your visit with us. Thank you!             Your Updated Medication List - Protect others around you: Learn how to safely use, store and throw away your medicines at www.disposemymeds.org.          This list is accurate as of: 9/26/17  9:38 PM.  Always use your most recent med list.                   Brand Name Dispense Instructions for use Diagnosis    DAILY MULTIVITAMIN PO      Take by mouth daily        VYVANSE 40 MG capsule   Generic drug:  lisdexamfetamine      40 mg every morning

## 2017-09-26 NOTE — NURSING NOTE
Chief Complaint   Patient presents with     Consult     Diaz, vocal fatigue and strain,  states started with a viral infection      Triston Goddard

## 2017-09-27 PROBLEM — J38.4 VOCAL FOLD EDEMA: Status: ACTIVE | Noted: 2017-09-27

## 2017-09-27 PROBLEM — R49.0 DYSPHONIA: Status: ACTIVE | Noted: 2017-09-27

## 2018-01-06 ENCOUNTER — TRANSFERRED RECORDS (OUTPATIENT)
Dept: HEALTH INFORMATION MANAGEMENT | Facility: CLINIC | Age: 23
End: 2018-01-06

## 2018-01-08 ENCOUNTER — TRANSFERRED RECORDS (OUTPATIENT)
Dept: HEALTH INFORMATION MANAGEMENT | Facility: CLINIC | Age: 23
End: 2018-01-08

## 2018-01-09 ENCOUNTER — TRANSFERRED RECORDS (OUTPATIENT)
Dept: HEALTH INFORMATION MANAGEMENT | Facility: CLINIC | Age: 23
End: 2018-01-09

## 2018-01-11 ENCOUNTER — TRANSFERRED RECORDS (OUTPATIENT)
Dept: HEALTH INFORMATION MANAGEMENT | Facility: CLINIC | Age: 23
End: 2018-01-11

## 2018-01-12 ENCOUNTER — TRANSFERRED RECORDS (OUTPATIENT)
Dept: HEALTH INFORMATION MANAGEMENT | Facility: CLINIC | Age: 23
End: 2018-01-12

## 2018-01-30 ASSESSMENT — ENCOUNTER SYMPTOMS
POOR WOUND HEALING: 0
BLOOD IN STOOL: 0
TASTE DISTURBANCE: 0
SINUS PAIN: 0
VOMITING: 0
ABDOMINAL PAIN: 1
SKIN CHANGES: 0
TROUBLE SWALLOWING: 0
HOARSE VOICE: 0
BOWEL INCONTINENCE: 0
SMELL DISTURBANCE: 0
RECTAL PAIN: 1
DIARRHEA: 0
SINUS CONGESTION: 0
NAUSEA: 1
BLOATING: 1
JAUNDICE: 0
HEARTBURN: 1
CONSTIPATION: 1
NAIL CHANGES: 1
SORE THROAT: 0
NECK MASS: 0

## 2018-01-30 ASSESSMENT — ANXIETY QUESTIONNAIRES
GAD7 TOTAL SCORE: 2
3. WORRYING TOO MUCH ABOUT DIFFERENT THINGS: NOT AT ALL
6. BECOMING EASILY ANNOYED OR IRRITABLE: SEVERAL DAYS
5. BEING SO RESTLESS THAT IT IS HARD TO SIT STILL: NOT AT ALL
1. FEELING NERVOUS, ANXIOUS, OR ON EDGE: NOT AT ALL
7. FEELING AFRAID AS IF SOMETHING AWFUL MIGHT HAPPEN: NOT AT ALL
4. TROUBLE RELAXING: SEVERAL DAYS
2. NOT BEING ABLE TO STOP OR CONTROL WORRYING: NOT AT ALL
GAD7 TOTAL SCORE: 2
7. FEELING AFRAID AS IF SOMETHING AWFUL MIGHT HAPPEN: NOT AT ALL

## 2018-01-31 ENCOUNTER — OFFICE VISIT (OUTPATIENT)
Dept: OBGYN | Facility: CLINIC | Age: 23
End: 2018-01-31
Attending: STUDENT IN AN ORGANIZED HEALTH CARE EDUCATION/TRAINING PROGRAM
Payer: COMMERCIAL

## 2018-01-31 VITALS
BODY MASS INDEX: 22.39 KG/M2 | DIASTOLIC BLOOD PRESSURE: 70 MMHG | WEIGHT: 134.4 LBS | HEIGHT: 65 IN | SYSTOLIC BLOOD PRESSURE: 118 MMHG | HEART RATE: 84 BPM

## 2018-01-31 DIAGNOSIS — Z11.3 SCREENING EXAMINATION FOR VENEREAL DISEASE: ICD-10-CM

## 2018-01-31 DIAGNOSIS — Z12.4 SCREENING FOR MALIGNANT NEOPLASM OF CERVIX: ICD-10-CM

## 2018-01-31 DIAGNOSIS — Z00.00 VISIT FOR PREVENTIVE HEALTH EXAMINATION: ICD-10-CM

## 2018-01-31 DIAGNOSIS — Z01.419 ENCOUNTER FOR GYNECOLOGICAL EXAMINATION WITHOUT ABNORMAL FINDING: ICD-10-CM

## 2018-01-31 DIAGNOSIS — Z30.012 ENCOUNTER FOR PRESCRIPTION OF EMERGENCY CONTRACEPTION: Primary | ICD-10-CM

## 2018-01-31 DIAGNOSIS — D07.1 VULVAR INTRAEPITHELIAL NEOPLASIA (VIN) GRADE 3: Primary | ICD-10-CM

## 2018-01-31 DIAGNOSIS — Z13.21 ENCOUNTER FOR VITAMIN DEFICIENCY SCREENING: ICD-10-CM

## 2018-01-31 DIAGNOSIS — Z23 NEED FOR PROPHYLACTIC VACCINATION AND INOCULATION AGAINST INFLUENZA: ICD-10-CM

## 2018-01-31 DIAGNOSIS — K64.4 EXTERNAL HEMORRHOIDS: ICD-10-CM

## 2018-01-31 PROCEDURE — G0145 SCR C/V CYTO,THINLAYER,RESCR: HCPCS | Performed by: STUDENT IN AN ORGANIZED HEALTH CARE EDUCATION/TRAINING PROGRAM

## 2018-01-31 PROCEDURE — 87491 CHLMYD TRACH DNA AMP PROBE: CPT | Performed by: STUDENT IN AN ORGANIZED HEALTH CARE EDUCATION/TRAINING PROGRAM

## 2018-01-31 PROCEDURE — G0463 HOSPITAL OUTPT CLINIC VISIT: HCPCS | Mod: ZF

## 2018-01-31 PROCEDURE — 87591 N.GONORRHOEAE DNA AMP PROB: CPT | Performed by: STUDENT IN AN ORGANIZED HEALTH CARE EDUCATION/TRAINING PROGRAM

## 2018-01-31 PROCEDURE — 56820 COLPOSCOPY VULVA: CPT | Mod: ZF | Performed by: STUDENT IN AN ORGANIZED HEALTH CARE EDUCATION/TRAINING PROGRAM

## 2018-01-31 RX ORDER — LEVONORGESTREL 1.5 MG/1
1.5 TABLET ORAL ONCE
Qty: 2 TABLET | Refills: 0 | Status: SHIPPED | OUTPATIENT
Start: 2018-01-31 | End: 2018-05-17

## 2018-01-31 RX ORDER — HYDROCORTISONE ACETATE 25 MG/1
25 SUPPOSITORY RECTAL 2 TIMES DAILY
Qty: 30 SUPPOSITORY | Refills: 0 | Status: SHIPPED | OUTPATIENT
Start: 2018-01-31 | End: 2018-05-17

## 2018-01-31 RX ORDER — OMEPRAZOLE AND SODIUM BICARBONATE 40; 1100 MG/1; MG/1
1 CAPSULE ORAL DAILY
COMMUNITY
End: 2018-11-01

## 2018-01-31 RX ORDER — POLYETHYLENE GLYCOL 3350 17 G/17G
1 POWDER, FOR SOLUTION ORAL DAILY
COMMUNITY
End: 2021-06-25

## 2018-01-31 ASSESSMENT — ANXIETY QUESTIONNAIRES: GAD7 TOTAL SCORE: 2

## 2018-01-31 NOTE — MR AVS SNAPSHOT
After Visit Summary   1/31/2018    Loreta Roth    MRN: [id number]           Patient Information     Date Of Birth          1995        Visit Information        Provider Department      1/31/2018 2:15 PM Jia Salinas MD [facility]        Today's Diagnoses     Encounter for prescription of emergency contraception    -  1    External hemorrhoids        Visit for preventive health examination        Need for prophylactic vaccination and inoculation against influenza        Screening examination for venereal disease        Encounter for vitamin deficiency screening        Screening for malignant neoplasm of cervix        Encounter for gynecological examination without abnormal finding          Care Instructions      PREVENTIVE HEALTH RECOMMENDATIONS:   Most women need a yearly breast and pelvic exam.    A PAP screen, a test done DURING a pelvic exam, is NO longer recommended yearly.    March 2013, screening guidelines recommended by ACOG for PAP screen are:    1) First pap at age 21.    2) Pap every 3 years until age 30.    3) After age 30, pap every 3 years or Pap with HR HPV screen every 5 years until age 65.  4) Women do NOT need a vaginal Pap screen after a hysterectomy (surgical removal of the uterus) when they have not had cancer.    Exceptions:  1) Yearly pap if HIV+ or immunosuppressed secondary to organ transplant  2) DOMINIK II-III continue routine screening for 20 years.    I encourage you continue looking for opportunities to choose a healthy lifestyle:       * Choose to eat a heart healthy diet. Check out the FOOD PLATE guidelines at: http://www.choosemyplate.gov/ for helpful hints on weight and cholesterol management.  Balance your caloric intake with exercise to maintain a BMI in the 22 to 26 range. For bone health: Eat calcium-rich foods like yogurt, broccoli or take chewable calcium pills (500 to 600 mg) twice a day with food.       * Exercise for at  least an average of 30 minutes a day, 5 days of the week. This will help you control your weight, release stress, and help prevent disease.      * Take a Vitamin D3 supplement daily fall through spring and during summer unless you [id number]' full body sun exposure to skin without sunscreen.      * DO wear sunscreen to prevent skin cancer after the first 15-30 minutes.      * Identify stressors in your life, find ways to release the stress, and, make time for yourself. PLEASE ask for help if mood changes last longer than two weeks.     * Limit alcohol to one drink per day.  No smoking.  Avoid second hand smoke. If you smoke, ask for help to stop.       *  If you are in a sexual relationship, talk with your partner about possible infection risks and take action to protect yourself from exposure to a sexual infection.    Please request an infection screen for STIs (sexually transmitted infections) if you are less than age 26 OR believe that you may be at risk.     Get a flu shot each year. Get a tetanus shot every 10 years. EVERYONE needs a pertussis (Whooping cough) booster.    See your dentist twice a year for an exam and preventive care cleaning.     Consider the following screen tests:    1) cholesterol test every 5 years.     2) yearly mammogram after age 40 unless you have identified risks.    3) colonoscopy every 10 years after age 50 unless you have identified risks.    4) diabetes blood test screening if you are at risk for diabetes.      Additional information that you may also find helpful:  The Internet now gives us access to LOTS of information -- some of it helpful, research documented and also plenty of harmful, anecdotal information that may not pertain to your situtaion. Consider visiting the following websites for accurate health information:    www.vitamindcouncil.org/ : Info and ongoing research re Vitamin D    www.fairview.org : Up to date and easily searchable information on multiple  topics.    www.medlineplus.gov : medication info, interactive tutorials, watch real surgeries online    www.cdc.gov : public health info, travel advisories, epidemics (H1N1)    www.chuyita/std.org: current research re diagnosis, treatment and prevention of sexually contacted infections.    www.health.state.[place] : [place] dept [facility], public health issues in [place], N1N1    www.familydoctor.org : good info from the Academy of Family Physicians                Follow-ups after your visit        Additional Services     COLORECTAL SURGERY REFERRAL       Your provider has referred you to: [facility] [[email or web address]]: Colon and Rectal Surgery Clinic [facility] [phone]   [email or web address]    Referral Reason(s): Hemorrhoids  Special Concerns: H/o MAURA 3  This referral is: Elective (week +)  It is OK to leave a message on patient's voicemail.    Please be aware that coverage of these services is subject to the terms and limitations of your health insurance plan.  Call member services at your health plan with any benefit or coverage questions.      Please bring the following with you to your appointment:    (1) Any X-Rays, CTs or MRIs which have been performed.  Contact the facility where they were done to arrange for  prior to your scheduled appointment.    (2) List of current medications  (3) This referral request   (4) Any documents/labs given to you for this referral                  Follow-up notes from your care team     Return in 6 months (on 7/31/2018) for Look at vulva (vulvoscopy).      Who to contact     Please call your clinic at [phone] to:    Ask questions about your health    Make or cancel appointments    Discuss your medicines    Learn about your test results    Speak to your doctor   If you have compliments or concerns about an experience at your clinic, or if you wish to file a complaint, please contact [facility] Patient Relations at  [phone] or email us at [email or web address]         Additional Information About Your Visit        [employer] Information     [employer] gives you secure access to your electronic health record. If you see a primary care provider, you can also send messages to your care team and make appointments. If you have questions, please call your primary care clinic.  If you do not have a primary care provider, please call [phone] and they will assist you.      [employer] is an electronic gateway that provides easy, online access to your medical records. With [employer], you can request a clinic appointment, read your test results, renew a prescription or communicate with your care team.     To access your existing account, please contact your [facility] or call [phone] for assistance.        Care EveryWhere ID     This is your Care EveryWhere ID. This could be used by other organizations to access your [place] medical records  [id number]        Your Vitals Were     Last Period                   01/15/2018 (Exact Date)            Blood Pressure from Last 3 Encounters:   01/31/18 118/70   05/30/17 117/82   05/11/17 129/89    Weight from Last 3 Encounters:   01/31/18 61 kg (134 lb 6.4 oz)   05/30/17 59.1 kg (130 lb 4.7 oz)   05/11/17 58.5 kg (129 lb)              We Performed the Following     25- OH-Vitamin D     Anti Treponema [JWH5711]     Chlamydia trachomatis PCR Swab      COLORECTAL SURGERY REFERRAL     FLU VACCINE, 3 YRS +, IM [25525]     Hepatitis B surface antigen [XAS272]     HIV Antigen Antibody Combo [OLV9542]     Neisseria gonorrhoeae PCR Swab [MSB4234]     Pap imaged thin layer screen only - recommended age 21 - 24 years     Pap Smear Exam [] Do Not Remove     Pelvic and Breast Exam Procedure []          Today's Medication Changes          These changes are accurate as of 1/31/18  3:04 PM.  If you have any questions, ask your nurse or doctor.                Start taking these medicines.        Dose/Directions    hydrocortisone 25 MG Suppository   Commonly known as:  ANUSOL-HC   Used for:  External hemorrhoids   Started by:  Jia Salinas MD        Dose:  25 mg   Place 1 suppository (25 mg) rectally 2 times daily   Quantity:  30 suppository   Refills:  0       levonorgestrel 1.5 MG Tabs tablet   Commonly known as:  PLAN B   Used for:  Encounter for prescription of emergency contraception   Started by:  Jia Salinas MD        Dose:  1.5 mg   Take 1 tablet (1.5 mg) by mouth once for 1 dose   Quantity:  2 tablet   Refills:  0            Where to get your medicines      These medications were sent to [id number] - [place], [place] - [address] AT [facility] [address]  [facility] [address], [place] [address]    Hours:  24-hours Phone:  [phone]     hydrocortisone 25 MG Suppository    levonorgestrel 1.5 MG Tabs tablet                Primary Care Provider    None Specified       No primary provider on file.        Equal Access to Services     [facility]: Hadii bandar guyo Soada, waaxda luqadaha, qaybta kaalmada adeegyajm, cj winkler . So [facility] [phone].    ATENCIÓN: Si habla español, tiene a spann disposición servicios gratuitos de asistencia lingüística. Llame al [phone].    We comply with applicable federal civil rights laws and [place] laws. We do not discriminate on the basis of race, color, national origin, age, disability, sex, sexual orientation, or gender identity.            Thank you!     Thank you for choosing [facility]  for your care. Our goal is always to provide you with excellent care. Hearing back from our patients is one way we can continue to improve our services. Please take a few minutes to complete the written survey that you may receive in the mail after your visit with us. Thank you!             Your Updated Medication List -  Protect others around you: Learn how to safely use, store and throw away your medicines at www.disposemymeds.org.          This list is accurate as of 1/31/18  3:04 PM.  Always use your most recent med list.                   Brand Name Dispense Instructions for use Diagnosis    DAILY MULTIVITAMIN PO      Take by mouth daily        hydrocortisone 25 MG Suppository    ANUSOL-HC    30 suppository    Place 1 suppository (25 mg) rectally 2 times daily    External hemorrhoids       levonorgestrel 1.5 MG Tabs tablet    PLAN B    2 tablet    Take 1 tablet (1.5 mg) by mouth once for 1 dose    Encounter for prescription of emergency contraception       VYVANSE 40 MG capsule   Generic drug:  lisdexamfetamine      40 mg every morning

## 2018-01-31 NOTE — PROGRESS NOTES
[facility]  Annual Exam    HPI:    Ms. oLreta Roth is a 22 year old G0, here for well woman exam. 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    Concerns: Itching of vulva, wants to make sure no new lesions. Hemorrhoids bothersome, has not gone to see colorectal yet about these but has been such a chronic issue wants definitive mgmt. After bowel movements can feel the hemorrhoid protrude and get more swollen.  Was seen in ED recently in [place] for upper abd pain with thorough w/u but nothing found. Recommended starting omeprazole. This helps. Also avoiding fatty foods.    GYN History  - LMP: 1/15/18  - Menses: regular, painful, 7 days, clots, heavy, 3 tampons per day  - Pap Smears: N h/o abnormal, last was NIL, 2016 - due 2019  - Breast health: Aware that has lumpy breasts, hasn't noted any changes. Doesn't do monthly exam   - Contraception: condoms/withdrawal. Took Plan B on 18. Doesn't want hormonal birth control as it affects her singing voice by limited the high range of her voice  - Sexual Activity: 1 male partner, same as in May. Lifetime partners 2 (male)  - Sexual Concerns: prior pain @ posterior fourchette w/ intercourse, none since CO2 laser done  - Hx STIs: Denies, wants full testing though  - Hx UTIs: ocassionally    OBHx  Obstetric History       T0      TAB0   SAB0   E0   M0   L0         PMHx: ADHD, unspecified epigastric pain (GERD?), constipation, hemorrhoids  PSHx: CO2 laser of vulva  Meds: Lisdexamfetamine, MV, naproxen, miralax, omeprazole  Allergies:  PCN - hives    SocHx: Nonsmoker. Drinks on average 1 drink per week. No drugs. Lives in house w/ 1 roommate. In grad school, sings.    ROS: 10-Point ROS negative except as noted in HPI    Preventative Health  Current or historical sexual, physical or mental abuse: Denies, safe in relationship and at home  Diet: Mostly eats on the  "run, does try to get salads on campus but mostly eats out of convenience. Trying to avoid greasy foods d/t exacerbation of upper abdominal pain  Exercise: none coordinated, does walk around campus a fair amount, prob ~30min per day    Physical Exam  LMP 01/15/2018 (Exact Date) HR 84 /70 Wt 134lb Ht 5'5\"  Gen: Well-appearing, NAD  HEENT: Normocephalic, atraumatic  Neck: Thyroid is not enlarged, no appreciable masses palpated. Non-tender  Abd: Soft, non-tender, non-distended  Ext: No LE edema, extremities warm and well perfused    Breast: Symmetric, no nipple discharge or retraction, no axillary lymphadenopathy, no palpable dominant masses bilaterally, does have bilateral fibrocystic changes    Pelvic:  Normal appearing external female genitalia. Miniscule abrasion at posterior fourchette (only saw because of stinging with acetic acid application and looked closely). Pubic hair shaven. Vagina is without lesions. Physiologic discharge. Cervix nulliparous, no lesions, no cervical motion tenderness. Uterus is small, mobile, non-tender. No adnexal tenderness or masses    Anus: Subcentimeter hemorrhoid noted anteriorly,not engorged    Assessment/Plan  Ms. Roth is a 22 year old G0 female here for annual exam. Overall healthy and doing well.    1. Routine Health Maintenance:   -Vit D lvl done given pt c/f adequate nutritional intake  -Discussed breast self awareness and what changes to look for. Breast exam wnl    2. Cervical cancer screening: Cytology done today given h/o VIN3 and patient preference.    3. Contraception: Does not want hormonal options given effects on voice as a maldonado. Did use Plan B early this month and uses condoms. Discussed more effective nonhormonal methods including the copper IUD and diaphragm and offered rx for Plan B or ulipristal acetate. She is not ready for an IUD or diaphragm at this time and will continue condoms. Would like an rx for Plan B to have on hand.    4. H/o VIN3: Vulvoscopy " done, see separate procedure note. No lesions noted, no bx done.    5. Hemorrhoids: taking miralax to help with constipation. Cortisone suppositories PRN. Colorectal referral.    Follow Up: In 6 months for repeat vulvoscopy and 1 year for annual    Jia Salinas MD  OBGYN PGY-2  [phone]  1/31/2018 2:57 PM    I agree with note as above. The patient was seen in continuity clinic by the resident doctor.  Assessment and plan were jointly made.  Sri Brown MD

## 2018-01-31 NOTE — LETTER
2018       RE: Loreta Roth  [address] [place] [facility] [address]     Dear Colleague,    Thank you for referring your patient, Loreta Roth, to the [facility] at [facility]. Please see a copy of my visit note below.    [facility] Procedure Note    21yo w/ h/o VIN3 that cleared spontaneously s/p biopsy, did receive CO2 laser ablation of site at posterior fourchette 2017. Here for vulvoscopy with possible biopsies.    Verified patient name, , procedure.    Discussed risks, benefits and alternatives. Pt agreed to proceed. Written informed consent signed.    Procedure: Acetic acid was applied to the vulva with soaked pad and allowed to sit for 5min. The patient was positioned in the dorsal lithotomy position and pad removed, vulva inspected carefully for acetowhite change. No lesions visualized and no area of acetowhite change. Did have small abrasion at posterior fourchette, approx 1mm.  Acetic acid applied to anus as well with no acetowhite change.    The patient tolerated the procedure well. She was instructed to return for repeat vulvoscopy in 6months. If this is normal then she can come in for annual inspection of the vulva (with annual exam).    Dr. Brown was present for the entire procedure.    Jia Salinas MD  OB Gyn PGY2  18 5:06 PM   I agree with note as above. I was present in room for vulvoscopy. The patient was seen in continuity clinic by the resident doctor.  Assessment and plan were jointly made.  Sri Brown MD      Again, thank you for allowing me to participate in the care of your patient.      Sincerely,    Jia Salinas MD

## 2018-01-31 NOTE — LETTER
2018       RE: Loreta Roth  [address] [facility] [address]     Dear Colleague,    Thank you for referring your patient, Loreta Roth, to the [facility] at [facility]. Please see a copy of my visit note below.    [facility]  Annual Exam    HPI:    Ms. Loreta Roth is a 22 year old G0, here for well woman exam. 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    Concerns: Itching of vulva, wants to make sure no new lesions. Hemorrhoids bothersome, has not gone to see colorectal yet about these but has been such a chronic issue wants definitive mgmt. After bowel movements can feel the hemorrhoid protrude and get more swollen.  Was seen in ED recently in [place] for upper abd pain with thorough w/u but nothing found. Recommended starting omeprazole. This helps. Also avoiding fatty foods.    GYN History  - LMP: 1/15/18  - Menses: regular, painful, 7 days, clots, heavy, 3 tampons per day  - Pap Smears: N h/o abnormal, last was NIL, 2016 - due 2019  - Breast health: Aware that has lumpy breasts, hasn't noted any changes. Doesn't do monthly exam   - Contraception: condoms/withdrawal. Took Plan B on 18. Doesn't want hormonal birth control as it affects her singing voice by limited the high range of her voice  - Sexual Activity: 1 male partner, same as in May. Lifetime partners 2 (male)  - Sexual Concerns: prior pain @ posterior fourchette w/ intercourse, none since CO2 laser done  - Hx STIs: Denies, wants full testing though  - Hx UTIs: ocassionally    OBHx  Obstetric History       T0      TAB0   SAB0   E0   M0   L0         PMHx: ADHD, unspecified epigastric pain (GERD?), constipation, hemorrhoids  PSHx: CO2 laser of vulva  Meds: Lisdexamfetamine, MV, naproxen, miralax, omeprazole  Allergies:  PCN - hives    SocHx: Nonsmoker. Drinks  "on average 1 drink per week. No drugs. Lives in house w/ 1 roommate. In grad school, sings.    ROS: 10-Point ROS negative except as noted in HPI    Preventative Health  Current or historical sexual, physical or mental abuse: Denies, safe in relationship and at home  Diet: Mostly eats on the run, does try to get salads on campus but mostly eats out of convenience. Trying to avoid greasy foods d/t exacerbation of upper abdominal pain  Exercise: none coordinated, does walk around campus a fair amount, prob ~30min per day    Physical Exam  LMP 01/15/2018 (Exact Date) HR 84 /70 Wt 134lb Ht 5'5\"  Gen: Well-appearing, NAD  HEENT: Normocephalic, atraumatic  Neck: Thyroid is not enlarged, no appreciable masses palpated. Non-tender  Abd: Soft, non-tender, non-distended  Ext: No LE edema, extremities warm and well perfused    Breast: Symmetric, no nipple discharge or retraction, no axillary lymphadenopathy, no palpable dominant masses bilaterally, does have bilateral fibrocystic changes    Pelvic:  Normal appearing external female genitalia. Miniscule abrasion at posterior fourchette (only saw because of stinging with acetic acid application and looked closely). Pubic hair shaven. Vagina is without lesions. Physiologic discharge. Cervix nulliparous, no lesions, no cervical motion tenderness. Uterus is small, mobile, non-tender. No adnexal tenderness or masses    Anus: Subcentimeter hemorrhoid noted anteriorly,not engorged    Assessment/Plan  Ms. Roth is a 22 year old G0 female here for annual exam. Overall healthy and doing well.    1. Routine Health Maintenance:   -Vit D lvl done given pt c/f adequate nutritional intake  -Discussed breast self awareness and what changes to look for. Breast exam wnl    2. Cervical cancer screening: Cytology done today given h/o VIN3 and patient preference.    3. Contraception: Does not want hormonal options given effects on voice as a maldonado. Did use Plan B early this month and uses " condoms. Discussed more effective nonhormonal methods including the copper IUD and diaphragm and offered rx for Plan B or ulipristal acetate. She is not ready for an IUD or diaphragm at this time and will continue condoms. Would like an rx for Plan B to have on hand.    4. H/o VIN3: Vulvoscopy done, see separate procedure note. No lesions noted, no bx done.    5. Hemorrhoids: taking miralax to help with constipation. Cortisone suppositories PRN. Colorectal referral.    Follow Up: In 6 months for repeat vulvoscopy and 1 year for annual    I agree with note as above. The patient was seen in continuity clinic by the resident doctor.  Assessment and plan were jointly made.  Sri Brown MD    Again, thank you for allowing me to participate in the care of your patient.      Sincerely,    Jia Salinas MD

## 2018-01-31 NOTE — MR AVS SNAPSHOT
After Visit Summary   1/31/2018    Loreta Roth    MRN: [id number]           Patient Information     Date Of Birth          1995        Visit Information        Provider Department      1/31/2018 1:45 PM Jia Salinas MD [facility]        Today's Diagnoses     Vulvar intraepithelial neoplasia (MAURA) grade 3    -  1       Follow-ups after your visit        Follow-up notes from your care team     Return in about 6 months (around 7/31/2018) for vulvoscopy.      Who to contact     Please call your clinic at [phone] to:    Ask questions about your health    Make or cancel appointments    Discuss your medicines    Learn about your test results    Speak to your doctor   If you have compliments or concerns about an experience at your clinic, or if you wish to file a complaint, please contact [facility] Patient Relations at [phone] or email us at [email or web address]         Additional Information About Your Visit        MyChart Information     [employer] gives you secure access to your electronic health record. If you see a primary care provider, you can also send messages to your care team and make appointments. If you have questions, please call your primary care clinic.  If you do not have a primary care provider, please call [phone] and they will assist you.      [employer] is an electronic gateway that provides easy, online access to your medical records. With [employer], you can request a clinic appointment, read your test results, renew a prescription or communicate with your care team.     To access your existing account, please contact your [facility] or call [phone] for assistance.        Care EveryWhere ID     This is your Care EveryWhere ID. This could be used by other organizations to access your [place] medical records  [id number]        Your Vitals Were     Pulse Height Last  "Period Breastfeeding? BMI (Body Mass Index)       84 1.651 m (5' 5\") 01/15/2018 (Exact Date) No 22.37 kg/m2        Blood Pressure from Last 3 Encounters:   01/31/18 118/70   05/30/17 117/82   05/11/17 129/89    Weight from Last 3 Encounters:   01/31/18 61 kg (134 lb 6.4 oz)   05/30/17 59.1 kg (130 lb 4.7 oz)   05/11/17 58.5 kg (129 lb)              We Performed the Following     COLPOSCOPY OF VULVA          Today's Medication Changes          These changes are accurate as of 1/31/18 11:59 PM.  If you have any questions, ask your nurse or doctor.               Start taking these medicines.        Dose/Directions    hydrocortisone 25 MG Suppository   Commonly known as:  ANUSOL-HC   Used for:  External hemorrhoids   Started by:  Jia Salinas MD        Dose:  25 mg   Place 1 suppository (25 mg) rectally 2 times daily   Quantity:  30 suppository   Refills:  0       levonorgestrel 1.5 MG Tabs tablet   Commonly known as:  PLAN B   Used for:  Encounter for prescription of emergency contraception   Started by:  Jia Salinas MD        Dose:  1.5 mg   Take 1 tablet (1.5 mg) by mouth once for 1 dose   Quantity:  2 tablet   Refills:  0            Where to get your medicines      These medications were sent to [facility] [id number] - [place], [place] - [address] AT [facility] [address]  [facility] [address], [place] [address]    Hours:  24-hours Phone:  [phone]     hydrocortisone 25 MG Suppository    levonorgestrel 1.5 MG Tabs tablet                Primary Care Provider    None Specified       No primary provider on file.        Equal Access to Services     DWIGHT BARRIGA AH: Jasbir Schaffer, kendrick siddiqui, jade ward, cj katz. So [facility] [phone].    ATENCIÓN: Si habla español, tiene a spann disposición servicios gratuitos de asistencia lingüística. Llame al [phone].    We comply with applicable federal civil rights laws " and [place] laws. We do not discriminate on the basis of race, color, national origin, age, disability, sex, sexual orientation, or gender identity.            Thank you!     Thank you for choosing [facility]  for your care. Our goal is always to provide you with excellent care. Hearing back from our patients is one way we can continue to improve our services. Please take a few minutes to complete the written survey that you may receive in the mail after your visit with us. Thank you!             Your Updated Medication List - Protect others around you: Learn how to safely use, store and throw away your medicines at www.disposemymeds.org.          This list is accurate as of 1/31/18 11:59 PM.  Always use your most recent med list.                   Brand Name Dispense Instructions for use Diagnosis    DAILY MULTIVITAMIN PO      Take by mouth daily        hydrocortisone 25 MG Suppository    ANUSOL-HC    30 suppository    Place 1 suppository (25 mg) rectally 2 times daily    External hemorrhoids       levonorgestrel 1.5 MG Tabs tablet    PLAN B    2 tablet    Take 1 tablet (1.5 mg) by mouth once for 1 dose    Encounter for prescription of emergency contraception       omeprazole-sodium bicarbonate  MG per capsule    ZEGERID     Take 1 capsule by mouth daily        polyethylene glycol Packet    MIRALAX/GLYCOLAX     Take 1 packet by mouth daily        VYVANSE 40 MG capsule   Generic drug:  lisdexamfetamine      40 mg every morning

## 2018-01-31 NOTE — PROGRESS NOTES
[facility] Procedure Note    21yo w/ h/o VIN3 that cleared spontaneously s/p biopsy, did receive CO2 laser ablation of site at posterior fourchette 2017. Here for vulvoscopy with possible biopsies.    Verified patient name, , procedure.    Discussed risks, benefits and alternatives. Pt agreed to proceed. Written informed consent signed.    Procedure: Acetic acid was applied to the vulva with soaked pad and allowed to sit for 5min. The patient was positioned in the dorsal lithotomy position and pad removed, vulva inspected carefully for acetowhite change. No lesions visualized and no area of acetowhite change. Did have small abrasion at posterior fourchette, approx 1mm.  Acetic acid applied to anus as well with no acetowhite change.    The patient tolerated the procedure well. She was instructed to return for repeat vulvoscopy in 6months. If this is normal then she can come in for annual inspection of the vulva (with annual exam).    Dr. Brown was present for the entire procedure.    Jia Salinas MD  OB Gyn PGY2  18 5:06 PM   I agree with note as above. I was present in room for vulvoscopy. The patient was seen in continuity clinic by the resident doctor.  Assessment and plan were jointly made.  Sri Brown MD

## 2018-01-31 NOTE — NURSING NOTE
Chief Complaint   Patient presents with     Physical     Surgical Followup     C02 laser excision

## 2018-01-31 NOTE — PATIENT INSTRUCTIONS

## 2018-02-01 LAB
C TRACH DNA SPEC QL NAA+PROBE: NEGATIVE
N GONORRHOEA DNA SPEC QL NAA+PROBE: NEGATIVE
SPECIMEN SOURCE: NORMAL
SPECIMEN SOURCE: NORMAL

## 2018-02-05 LAB
COPATH REPORT: NORMAL
PAP: NORMAL

## 2018-02-12 ENCOUNTER — TELEPHONE (OUTPATIENT)
Dept: OBGYN | Facility: CLINIC | Age: 23
End: 2018-02-12

## 2018-02-12 NOTE — TELEPHONE ENCOUNTER
Loreta calling for name of colon/rectal surgery referral Dr Salinas did. I called her back and left [facility] phone number for her to call and schedule [phone]

## 2018-02-16 NOTE — TELEPHONE ENCOUNTER
APPT INFO    Date /Time: 2/26/18   Reason for Appt: Hemorrhoids   Ref Provider/Clinic: Dr Salinas, [facility]   Are there internal records? Yes/No?  IF YES, list clinic names: Yes  See Above   Are there outside records? Yes/No? Yes  2 [place] ED visits - See CareEverywhere tab in EPIC   Patient Contact (Y/N) & Call Details: No referred   Action: Reviewed records, Records are in EPIC     OUTSIDE RECORDS CHECKLIST     CLINIC NAME COMMENTS REC (x) IMG (x)   CareEverywhere [facility] hospital ER/HOSP: 1/9/18, 1/6/18  PATH/CULTURE: 1/11/18  LABS: 2018 x na

## 2018-02-26 ENCOUNTER — OFFICE VISIT (OUTPATIENT)
Dept: SURGERY | Facility: CLINIC | Age: 23
End: 2018-02-26
Payer: COMMERCIAL

## 2018-02-26 ENCOUNTER — PRE VISIT (OUTPATIENT)
Dept: SURGERY | Facility: CLINIC | Age: 23
End: 2018-02-26

## 2018-02-26 VITALS
TEMPERATURE: 98.5 F | HEIGHT: 65 IN | WEIGHT: 135.8 LBS | OXYGEN SATURATION: 96 % | BODY MASS INDEX: 22.63 KG/M2 | HEART RATE: 81 BPM | DIASTOLIC BLOOD PRESSURE: 71 MMHG | SYSTOLIC BLOOD PRESSURE: 124 MMHG

## 2018-02-26 DIAGNOSIS — K59.09 OTHER CONSTIPATION: ICD-10-CM

## 2018-02-26 DIAGNOSIS — R10.11 RUQ ABDOMINAL PAIN: ICD-10-CM

## 2018-02-26 DIAGNOSIS — L29.0 PRURITUS ANI: Primary | ICD-10-CM

## 2018-02-26 DIAGNOSIS — K62.89 RECTAL INFLAMMATION: ICD-10-CM

## 2018-02-26 DIAGNOSIS — K62.82 ANAL DYSPLASIA: ICD-10-CM

## 2018-02-26 ASSESSMENT — ENCOUNTER SYMPTOMS
INSOMNIA: 1
RECTAL PAIN: 1
POOR WOUND HEALING: 0
CONSTIPATION: 1
SKIN CHANGES: 0
BLOOD IN STOOL: 1
NERVOUS/ANXIOUS: 0
JAUNDICE: 0
ABDOMINAL PAIN: 1
DECREASED CONCENTRATION: 1
HEARTBURN: 1
DEPRESSION: 0
NAUSEA: 1
BLOATING: 1
BOWEL INCONTINENCE: 0
NAIL CHANGES: 0
VOMITING: 1
DIARRHEA: 0
PANIC: 0

## 2018-02-26 ASSESSMENT — PAIN SCALES - GENERAL: PAINLEVEL: NO PAIN (0)

## 2018-02-26 NOTE — NURSING NOTE
"Chief Complaint   Patient presents with     Clinic Care Coordination - Initial     new       Vitals:    02/26/18 1546   BP: 124/71   Pulse: 81   Temp: 98.5  F (36.9  C)   TempSrc: Oral   SpO2: 96%   Weight: 135 lb 12.8 oz   Height: 5' 5\"       Body mass index is 22.6 kg/(m^2).    Sri TORRE LPN                       "

## 2018-02-26 NOTE — MR AVS SNAPSHOT
After Visit Summary   2/26/2018    Loreta Roth    MRN: [id number]           Patient Information     Date Of Birth          1995        Visit Information        Provider Department      2/26/2018 4:00 PM Radha Tinoco APRN CNP [facility]        Today's Diagnoses     Anal dysplasia    -  1      Care Instructions    GUIDE FOR PATIENTS WITH PRURITUS ANI    Pruritus ani, literally, means  itchy anus . This condition can have numerous causes, which can be as simple as hemorrhoids or as complex as uncommon infections and skin diseases. The purpose of the history, physical exam, and lab studies we have done and ordered will be to narrow the diagnostic possibilities to assure that you are getting the right treatment. As a first step, there are a number of things that need to be started and continued for the next 3-4 weeks.     1. Diet. Multiple foods can cause or worsen inflammation and itchiness around the anus. These include: coffee, tea, soda, alcohol, citrus, tomatoes, berries, and spicy or heavily spiced foods. These should be reduced or eliminated, if possible, from your diet.     2. Stop ALL creams, ointments, and topical preparations that you apply to your anus. It is very important that you inform us of all medications your are currently taking, including antibiotics, pain killers, and over-the-counter or natural medications. Occasionally (specially when the skin is very irritated), we will recommend a barrier cream, such as Calmoseptine or Desitin. Apply this 3-4 times per day.    3. We recommend daily bathing, preferably in a shower. Do not use medicated or perfumed soaps. Dove  is ideal as it is not soap and does not irritate your skin and do not use between buttocks. Washing-off the anus completely at the end of your shower is helpful to avoid any soap or shampoo residue on your skin. Also, using a hair dryer (on the cool air setting) is recommended  to completely dry the perianal skin. If you have persistent leakage or a wet anus, we recommend placing a cotton ball or dry gauze and changing it as needed during the day. Loose, cotton undergarments are also recommended. Avoid recurrent and over-vigorous wiping after bowel movements. Unscented and non-medicated  wet-wipes  or  baby wipes  are preferred.    4. Persistent itchiness will be treated with an oral medication, such as Atarax. These medications may cause grogginess and upset stomach. Make sure you take it with a meal or with a cracker or piece of toast.    5. Start on a fiber supplement such as Citrucel 3 times a day with 8-10 glasses of water a day. This will allow for a soft and easy bowel movement and will help  soak up  the extra moisture in your colon and rectum. Can use Miralax in addition if constipated but try starting with a half dose.    6. Colonoscopy              Follow-ups after your visit        Who to contact     Please call your clinic at [phone] to:    Ask questions about your health    Make or cancel appointments    Discuss your medicines    Learn about your test results    Speak to your doctor            Additional Information About Your Visit        [employer] Information     [employer] gives you secure access to your electronic health record. If you see a primary care provider, you can also send messages to your care team and make appointments. If you have questions, please call your primary care clinic.  If you do not have a primary care provider, please call [phone] and they will assist you.      [employer] is an electronic gateway that provides easy, online access to your medical records. With [employer], you can request a clinic appointment, read your test results, renew a prescription or communicate with your care team.     To access your existing account, please contact your [facility] or call [phone] for assistance.        Care EveryWhere ID   "   This is your Care EveryWhere ID. This could be used by other organizations to access your [place] medical records  [id number]        Your Vitals Were     Pulse Temperature Height Pulse Oximetry BMI (Body Mass Index)       81 98.5  F (36.9  C) (Oral) 5' 5\" 96% 22.6 kg/m2        Blood Pressure from Last 3 Encounters:   02/26/18 124/71   01/31/18 118/70   05/30/17 117/82    Weight from Last 3 Encounters:   02/26/18 135 lb 12.8 oz   01/31/18 134 lb 6.4 oz   05/30/17 130 lb 4.7 oz              Today, you had the following     No orders found for display       Primary Care Provider    None Specified       No primary provider on file.        Equal Access to Services     ELISHA BARRIGA : Jasbir Schaffer, kendrick siddiqui, jade gonzalezalrandal ward, cj winkler . So [facility] [phone].    ATENCIÓN: Si habla español, tiene a spann disposición servicios gratuitos de asistencia lingüística. [facility] [phone].    We comply with applicable federal civil rights laws and [place] laws. We do not discriminate on the basis of race, color, national origin, age, disability, sex, sexual orientation, or gender identity.            Thank you!     Thank you for choosing [facility]  for your care. Our goal is always to provide you with excellent care. Hearing back from our patients is one way we can continue to improve our services. Please take a few minutes to complete the written survey that you may receive in the mail after your visit with us. Thank you!             Your Updated Medication List - Protect others around you: Learn how to safely use, store and throw away your medicines at www.disposemymeds.org.          This list is accurate as of 2/26/18  4:29 PM.  Always use your most recent med list.                   Brand Name Dispense Instructions for use Diagnosis    DAILY MULTIVITAMIN PO      Take by mouth daily        hydrocortisone 25 MG Suppository    ANUSOL-HC "    30 suppository    Place 1 suppository (25 mg) rectally 2 times daily    External hemorrhoids       levonorgestrel 1.5 MG Tabs tablet    PLAN B    2 tablet    Take 1 tablet (1.5 mg) by mouth once for 1 dose    Encounter for prescription of emergency contraception       omeprazole-sodium bicarbonate  MG per capsule    ZEGERID     Take 1 capsule by mouth daily        polyethylene glycol Packet    MIRALAX/GLYCOLAX     Take 1 packet by mouth daily        VYVANSE 40 MG capsule   Generic drug:  lisdexamfetamine      40 mg every morning

## 2018-02-26 NOTE — PROGRESS NOTES
"Colon and Rectal Surgery Consult Clinic Note    Date: 2018     Referring provider:  Jia Salinas MD  [facility]  [address] [address]     RE: Loreta Roth  : 1995  DEBBIE: 2018    Loreta Roth is a very pleasant 22 year old female without a significant past medical history with a recent diagnosis of anal itching.  Given these findings they were subsequently sent to the Colon and Rectal Surgery Clinic for an opinion on this and a new patient consultation.     Loreta reports having a very intense anal itching.  This initially started last year and was both perianal and on her vulva.  She was diagnosed with MAURA 3 which was excised.  She had a recent checkup with no additional dysplasia noted.  Her perianal itching has remained and is very intense at times.  She finds herself scratching at night and this can sometimes cause skin breakdown.  She always feels as though there is moisture in the area when the itching occurs.  She additionally has had some sharp pain with bowel movements and \"superficial bleeding\" when her skin is very irritated.  She denies any blood mixed with her stools.  She has a long-standing history of constipation and thinks this is related to her ADHD medications.  She was recently started on daily MiraLAX and her bowel movements are now much softer but sometimes slightly loose.  She can go for a week without having a bowel movement when she is not on the MiraLAX and would often hold her stool when she was in college as she was afraid to go in a public restroom.  She additionally has had some abdominal pain.  She presented to the emergency room back in [place] twice in January with severe upper abdominal pain.  She reports that she had an EGD and a gallbladder ultrasound which were all normal.  The severe pain has resolved but she does get some intermittent upper abdominal pains.  She stops eating when these occur and they seem to go " away.  She denies any family history of any inflammatory bowel disease or colon cancer but does report that her father has gastroparesis.  She has never had a colonoscopy.  She is otherwise healthy.  She is not a smoker.  She is in graduate school here studying music and hopes to be an .    Assessment/Plan: 22 year old female with pruritus ani and abdominal pain.  On exam her perianal skin is actually fairly intact.  I did recommend an anal Pap smear today given her history of MAURA 3.  If this is abnormal, would recommend high-resolution anoscopy.  Anoscopy did show inflammation in her rectum.  She denies any active bleeding but given these findings and her abdominal pain, I did recommend a colonoscopy.  If this is normal, would have her follow-up with gastroenterology for ongoing intermittent abdominal pain. For her itching, discussed that this is likely due to leakage of moisture. Recommended a fiber supplement such as Metamucil, Citrucel, or Benefiber to bulk up stools.  Given her chronic constipation, she may additionally need MiraLAX but I would like her to try taking a smaller dose of this is loose stools could contribute to her symptoms.  Try foliding a gauze in the buttock crease and determine whether there is any fecal staining that could be contributing and to keep area dry, Calmoseptine cream or zinc cream, Avoid scratching to avoid further trauma, increase fiber and water in diet, diet modification-try avoiding spicy foods, caffeine, tomatoes, carbonated beverages, milk products, cheese, chocolate, nuts, etc., using wet wipes rather than toilet paper, try wearing only cotton underwear, and avoid perfume-containing soaps (can try Dove soap). Try these interventions for 6-8 weeks and return to clinic if symptoms persist. Patient's questions were answered to her stated satisfaction and she is in agreement with this plan.    Medical history:  Past Medical History:   Diagnosis Date     ADHD  (attention deficit hyperactivity disorder)      Constipation      Epigastric pain 01/10/2018    S/p w/u in [place] (neg US for gallstones, neg EGD for ulcers, improved w/ limited fatty foods)     Hemorrhoids      Hoarseness 9/06/17    Came with severe sore throat     MAURA III (vulvar intraepithelial neoplasia III)     Spontaneous resolution, CO2 laser 5/30/17. Vulvoscopy neg 1/31/18. [] Vulvoscopy ~8/2018        Surgical history:  Past Surgical History:   Procedure Laterality Date     LASER CO2 VULVA N/A 5/30/2017    Procedure: LASER CO2 VULVA;  Wide Local CO2 Laser Of Vulva;  Surgeon: Yaquelin Rebolledo MD;  Location: [facility] OR       Problem list:  Patient Active Problem List    Diagnosis Date Noted     Vulvar intraepithelial neoplasia (MAURA) grade 3 01/31/2018     Priority: Medium     At posterior fourchette diagnosed 5/2017. 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     Priority: Medium     Lesion of vulva @ posterior fourchette 03/31/2017     Priority: Medium     Biopsied 3/31/17  Results: *         Medications:  Current Outpatient Prescriptions   Medication Sig Dispense Refill     hydrocortisone (ANUSOL-HC) 25 MG Suppository Place 1 suppository (25 mg) rectally 2 times daily 30 suppository 0     polyethylene glycol (MIRALAX/GLYCOLAX) Packet Take 1 packet by mouth daily       omeprazole-sodium bicarbonate (ZEGERID)  MG per capsule Take 1 capsule by mouth daily       Multiple Vitamins-Minerals (DAILY MULTIVITAMIN PO) Take by mouth daily        lisdexamfetamine (VYVANSE) 40 MG capsule 40 mg every morning        levonorgestrel (PLAN B) 1.5 MG TABS tablet Take 1 tablet (1.5 mg) by mouth once for 1 dose 2 tablet 0       Allergies:  Allergies   Allergen Reactions     Penicillins Hives       Family history:  Family History   Problem Relation  "Age of Onset     Hypertension Father      Anxiety Disorder Father      Depression Father      DIABETES Father      Type 1     Obesity Father      CEREBROVASCULAR DISEASE Father      Hypertension Paternal Grandmother      Depression Paternal Grandmother      Hypertension Paternal Grandfather      MENTAL ILLNESS Paternal Grandfather      schizophrenia     Obesity Paternal Grandfather      MENTAL ILLNESS Paternal Half-Sister      MENTAL ILLNESS Other      paranoid schizophrenia     MENTAL ILLNESS Maternal Grandmother      Substance Abuse Maternal Grandfather      Alcohol     Depression Maternal Grandfather      Depression Mother      Depression Paternal Half-Sister      DIABETES Other      Type 1     Obesity Paternal Half-Sister      Obesity Cousin        Social history:  Social History   Substance Use Topics     Smoking status: Never Smoker     Smokeless tobacco: Never Used     Alcohol use 0.6 oz/week     1 Standard drinks or equivalent per week      Comment: Occasional    Marital status: single.  Occupation:  studying music.    Nursing Notes:   Sri Fox LPN  2/26/2018  3:54 PM  Signed  Chief Complaint   Patient presents with     Clinic Care Coordination - Initial     new       Vitals:    02/26/18 1546   BP: 124/71   Pulse: 81   Temp: 98.5  F (36.9  C)   TempSrc: Oral   SpO2: 96%   Weight: 135 lb 12.8 oz   Height: 5' 5\"       Body mass index is 22.6 kg/(m^2).    Sri TORRE LPN                          Physical Examination:  /71  Pulse 81  Temp 98.5  F (36.9  C) (Oral)  Ht 5' 5\"  Wt 135 lb 12.8 oz  SpO2 96%  BMI 22.6 kg/m2  General: Alert, oriented, in no acute distress, sitting comfortably  HEENT: Mucous membranes moist  Perianal external examination: Exam was chaperoned by Sri Fox LPN  Perianal skin: Intact with no excoriation or lichenification.  Lesions: No evidence of an external lesion, nodularity, or induration in the perianal region.  Eversion of buttocks: There was not " evidence of an anal fissure. Details: N/A.  Skin tags or external hemorrhoids: None.  Digital rectal examination: Was performed.   Sphincter tone: Good.  Palpable lesions: No.  Other: None.  Bimanual examination: was not performed    Anoscopy: Was performed.   Hemorrhoids: No significant internal hemorrhoids.  Lesions: Inflammation in distal rectum    Total face to face time was 30 minutes, >50% counseling.    LUCIAN Johnson, NP-C  Colon and Rectal Surgery   [facility]    This note was created using speech recognition software and may contain unintended word substitutions.

## 2018-02-26 NOTE — PATIENT INSTRUCTIONS
GUIDE FOR PATIENTS WITH PRURITUS ANI    Pruritus ani, literally, means  itchy anus . This condition can have numerous causes, which can be as simple as hemorrhoids or as complex as uncommon infections and skin diseases. The purpose of the history, physical exam, and lab studies we have done and ordered will be to narrow the diagnostic possibilities to assure that you are getting the right treatment. As a first step, there are a number of things that need to be started and continued for the next 3-4 weeks.     1. Diet. Multiple foods can cause or worsen inflammation and itchiness around the anus. These include: coffee, tea, soda, alcohol, citrus, tomatoes, berries, and spicy or heavily spiced foods. These should be reduced or eliminated, if possible, from your diet.     2. Stop ALL creams, ointments, and topical preparations that you apply to your anus. It is very important that you inform us of all medications your are currently taking, including antibiotics, pain killers, and over-the-counter or natural medications. Occasionally (specially when the skin is very irritated), we will recommend a barrier cream, such as Calmoseptine or Desitin. Apply this 3-4 times per day.    3. We recommend daily bathing, preferably in a shower. Do not use medicated or perfumed soaps. Dove  is ideal as it is not soap and does not irritate your skin and do not use between buttocks. Washing-off the anus completely at the end of your shower is helpful to avoid any soap or shampoo residue on your skin. Also, using a hair dryer (on the cool air setting) is recommended to completely dry the perianal skin. If you have persistent leakage or a wet anus, we recommend placing a cotton ball or dry gauze and changing it as needed during the day. Loose, cotton undergarments are also recommended. Avoid recurrent and over-vigorous wiping after bowel movements. Unscented and non-medicated  wet-wipes  or  baby wipes  are preferred.    4. Persistent  itchiness will be treated with an oral medication, such as Atarax. These medications may cause grogginess and upset stomach. Make sure you take it with a meal or with a cracker or piece of toast.    5. Start on a fiber supplement such as Citrucel 3 times a day with 8-10 glasses of water a day. This will allow for a soft and easy bowel movement and will help  soak up  the extra moisture in your colon and rectum. Can use Miralax in addition if constipated but try starting with a half dose.    6. Colonoscopy

## 2018-02-28 ENCOUNTER — TELEPHONE (OUTPATIENT)
Dept: SURGERY | Facility: CLINIC | Age: 23
End: 2018-02-28

## 2018-02-28 LAB — COPATH REPORT: NORMAL

## 2018-02-28 NOTE — TELEPHONE ENCOUNTER
Called to discuss anal cytology showing ASCUS. Recommended HRA and she is agreeable to this.    LUCIAN Johnson, NP-C  Colon and Rectal Surgery  [facility]

## 2018-03-12 ENCOUNTER — TELEPHONE (OUTPATIENT)
Dept: GASTROENTEROLOGY | Facility: OUTPATIENT CENTER | Age: 23
End: 2018-03-12

## 2018-03-15 ENCOUNTER — TELEPHONE (OUTPATIENT)
Dept: GASTROENTEROLOGY | Facility: OUTPATIENT CENTER | Age: 23
End: 2018-03-15

## 2018-03-15 NOTE — TELEPHONE ENCOUNTER
Patient taking any blood thinners ? no    Heart disease ? denies    Lung disease ?denies      Sleep apnea ?denies    Diabetic ?denies    Kidney disease ?denies    Dialysis ? n/a    Electronic implanted medical devices ?denies    Are you taking any narcotic pain medication ? no  What is your daily dosage ?    PTSD ? n/a    Prep instructions reviewed with patient ? Patient declined review.  policy, MAC sedation plan reviewed.    Pharmacy : n/a    Indication for procedure :   Anal dysplasia [K62.82]          Rectal inflammation [K62.89]             Referring provider :Jia Salinas MD     Arrival Time : Instructed patient to arrive at 2 PM

## 2018-03-19 ENCOUNTER — TRANSFERRED RECORDS (OUTPATIENT)
Dept: HEALTH INFORMATION MANAGEMENT | Facility: CLINIC | Age: 23
End: 2018-03-19

## 2018-03-19 ENCOUNTER — DOCUMENTATION ONLY (OUTPATIENT)
Dept: GASTROENTEROLOGY | Facility: OUTPATIENT CENTER | Age: 23
End: 2018-03-19
Payer: COMMERCIAL

## 2018-03-21 LAB — COPATH REPORT: NORMAL

## 2018-03-22 ENCOUNTER — TELEPHONE (OUTPATIENT)
Dept: SURGERY | Facility: CLINIC | Age: 23
End: 2018-03-22

## 2018-03-22 NOTE — TELEPHONE ENCOUNTER
Also discussed benign pathology from colonoscopy.  Would like to see her in follow-up in 1-2 months for recheck.  Left a message.    LUCIAN Johnson, NP-C  Colon and Rectal Surgery  [facility]

## 2018-04-16 ENCOUNTER — TELEPHONE (OUTPATIENT)
Dept: SURGERY | Facility: CLINIC | Age: 23
End: 2018-04-16

## 2018-04-16 NOTE — TELEPHONE ENCOUNTER
Pt return call to reschedule HRA procedure with Radha Chakraborty NP. Patient missed 3/29/18 due to death in the family. Appt rescheduled to 4/17/18 @ 11am. Pt confirmed appt. Pt advise to call if she needs to cancel or reschedule.  Sharonda BROOKS LPN

## 2018-05-17 ENCOUNTER — OFFICE VISIT (OUTPATIENT)
Dept: SURGERY | Facility: CLINIC | Age: 23
End: 2018-05-17
Payer: COMMERCIAL

## 2018-05-17 VITALS
WEIGHT: 132.6 LBS | HEART RATE: 97 BPM | SYSTOLIC BLOOD PRESSURE: 106 MMHG | OXYGEN SATURATION: 98 % | DIASTOLIC BLOOD PRESSURE: 77 MMHG | BODY MASS INDEX: 22.09 KG/M2 | TEMPERATURE: 98.4 F | HEIGHT: 65 IN

## 2018-05-17 DIAGNOSIS — K62.82 ANAL DYSPLASIA: Primary | ICD-10-CM

## 2018-05-17 ASSESSMENT — PAIN SCALES - GENERAL: PAINLEVEL: NO PAIN (0)

## 2018-05-17 NOTE — LETTER
2018       RE: Loreta Roth  [address] [place] [facility] [address]     Dear Colleague,    Thank you for referring your patient, Loreta Roth, to the [facility] COLON AND RECTAL SURGERY at [facility]. Please see a copy of my visit note below.      Colon and Rectal Surgery Clinic High Resolution Anoscopy Note    RE: Loreta Roth  : 1995  DEBBIE: 2018    Loreta Roth is a 22 year old female with a history of MAURA 3 with anal cytology on 18 who presents today for high resolution anoscopy.     HPI: Loreta has been doing well. Her constipation is better with daily Miralax. She has not yet tried a fiber supplement. She also finds that her constipation is better when she does not take her vyvanse. She had a colonoscopy with Dr. Arechiga on 3/19/18 with proctitis. She has had some ongoing abdominal pain with EGD and gallbladder US which were normal. She plans to meet with gastroenterology for this.    ASSESSMENT: Written, informed consent was obtained prior to procedure.  Prior to the start of the procedure and with procedural staff participation, I verbally confirmed the patient s identity using two indicators, relevant allergies, that the procedure was appropriate and matched the consent or emergent situation, and that the correct equipment/implants were available. Immediately prior to starting the procedure I conducted the Time Out with the procedural staff and re-confirmed the patient s name, procedure, and site/side. (The Joint Commission universal protocol was followed.)  Yes    Sedation (Moderate or Deep): None    Anal cytology was obtained with Dacron swab. Digital anal rectal exam was performed without any palpable lesions. Dilute acetic acid soak was completed for 2 minutes. Lubricant was used to insert the anoscope. Performed high resolution microscopy using the colposcope. The dentate line was viewed in its entirety. Abnormal areas noted were some  mild acetowhitening throughout the anal canal without punctation. One very small area of bright acetowhitening with punctation in the left lateral position at the anal verge. After injection with 1% lidocaine with epinephrine, biopsy was obtained at this site. Fulguration was not performed but the entire lesion was removed with biopsy. Hemostasis was obtained using Monsel solution. Continued proctitis in the rectum.  The perianal area was inspected after acetic acid soak. The findings noted were no additional lesions, acetowhitening, or punctation.     The patient tolerated the procedures well.    PLAN: Will follow up with patient with results of biopsy from today. If low grade dysplasia or normal, follow up in 6 months for repeat high resolution anoscopy. If high grade dysplasia, this was removed in clinic and she can follow up in 3 months for repeat high resolution anoscopy. Encouraged her to try a daily fiber supplement and follow up with gastroenterology as planned.  Patient's questions were answered to her stated satisfaction and she is in agreement with this plan.    For details of past medical history, surgical history, family history, medications, allergies, and review of systems, please see details below.    Medical history:  Past Medical History:   Diagnosis Date     ADHD (attention deficit hyperactivity disorder)      Constipation      Epigastric pain 01/10/2018    S/p w/u in [place] (neg US for gallstones, neg EGD for ulcers, improved w/ limited fatty foods)     Hemorrhoids      Hoarseness 9/06/17    Came with severe sore throat     MAURA III (vulvar intraepithelial neoplasia III)     Spontaneous resolution, CO2 laser 5/30/17. Vulvoscopy neg 1/31/18. [] Vulvoscopy ~8/2018        Surgical history:  Past Surgical History:   Procedure Laterality Date     LASER CO2 VULVA N/A 5/30/2017    Procedure: LASER CO2 VULVA;  Wide Local CO2 Laser Of Vulva;  Surgeon: Yaquelin Rebolledo MD;  Location:  OR  "      Family history:  Family History   Problem Relation Age of Onset     Hypertension Father      Anxiety Disorder Father      Depression Father      DIABETES Father      Type 1     Obesity Father      CEREBROVASCULAR DISEASE Father      Hypertension Paternal Grandmother      Depression Paternal Grandmother      Hypertension Paternal Grandfather      MENTAL ILLNESS Paternal Grandfather      schizophrenia     Obesity Paternal Grandfather      MENTAL ILLNESS Paternal Half-Sister      MENTAL ILLNESS Other      paranoid schizophrenia     MENTAL ILLNESS Maternal Grandmother      Substance Abuse Maternal Grandfather      Alcohol     Depression Maternal Grandfather      Depression Mother      Depression Paternal Half-Sister      DIABETES Other      Type 1     Obesity Paternal Half-Sister      Obesity Cousin        Medications:  Current Outpatient Prescriptions   Medication Sig Dispense Refill     lisdexamfetamine (VYVANSE) 40 MG capsule 40 mg every morning        Multiple Vitamins-Minerals (DAILY MULTIVITAMIN PO) Take by mouth daily        polyethylene glycol (MIRALAX/GLYCOLAX) Packet Take 1 packet by mouth daily       omeprazole-sodium bicarbonate (ZEGERID)  MG per capsule Take 1 capsule by mouth daily       Allergies:  The patientis allergic to penicillins.    Social history:  Social History   Substance Use Topics     Smoking status: Never Smoker     Smokeless tobacco: Never Used     Alcohol use 0.6 oz/week     1 Standard drinks or equivalent per week      Comment: Occasional     Marital status: single.    Review of Systems:  Nursing Notes:   Sri Fox LPN  5/17/2018 11:20 AM  Signed  Chief Complaint   Patient presents with     HRA     HRA       Vitals:    05/17/18 1118   BP: 106/77   Pulse: 97   Temp: 98.4  F (36.9  C)   TempSrc: Oral   SpO2: 98%   Weight: 132 lb 9.6 oz   Height: 5' 5\"       Body mass index is 22.07 kg/(m^2).      GELY Miller Mary, LPN  5/17/2018 11:54 " AM  Signed  The following medication was given:     MEDICATION:  Lidocaine with epinephrine  ROUTE: SQ  SITE: Rectum    DOSE: 1% 30mL  LOT #: [id number]  : Hospira  EXPIRATION DATE: 2/1/2019  NDC#: [id number]   Was there drug waste? Yes  Amount of drug waste (mL): 25.  Reason for waste:  Single use vial      Sri Fox LPN  May 17, 2018     This procedure was performed under a collaborative agreement with Dr. Fabrice Arechiga MD, Chief of Colon and Rectal Surgery, [facility].    Radha Chakraborty NP-C  Colon and Rectal Surgery  [facility]      This note was created using speech recognition software and may contain unintended word substitutions.

## 2018-05-17 NOTE — NURSING NOTE
The following medication was given:     MEDICATION:  Lidocaine with epinephrine  ROUTE: SQ  SITE: Rectum    DOSE: 1% 30mL  LOT #: [id number]  : Hospira  EXPIRATION DATE: 2/1/2019  NDC#: 7913291589   Was there drug waste? Yes  Amount of drug waste (mL): 25.  Reason for waste:  Single use vial      Sri Fox LPN  May 17, 2018

## 2018-05-17 NOTE — MR AVS SNAPSHOT
After Visit Summary   5/17/2018    Loreta Roth    MRN: [id number]           Patient Information     Date Of Birth          1995        Visit Information        Provider Department      5/17/2018 11:00 AM [facility] Tinoco [facility]N [facility]        Today's Diagnoses     Anal dysplasia    -  1       Follow-ups after your visit        Your next 10 appointments already scheduled     Sep 14, 2018  1:40 PM CDT   (Arrive by 1:25 PM)   New Patient Visit with JOSÉ MIGUEL Gutierrez [facility] ([facility])    [address] [place] [id number] [phone]              Who to contact     Please call your clinic at [phone] to:    Ask questions about your health    Make or cancel appointments    Discuss your medicines    Learn about your test results    Speak to your doctor            Additional Information About Your Visit        MyChart Information     [employer] gives you secure access to your electronic health record. If you see a primary care provider, you can also send messages to your care team and make appointments. If you have questions, please call your primary care clinic.  If you do not have a primary care provider, please call [phone] and they will assist you.      [employer] is an electronic gateway that provides easy, online access to your medical records. With [employer], you can request a clinic appointment, read your test results, renew a prescription or communicate with your care team.     To access your existing account, please contact your [facility] or call [phone] for assistance.        Care EveryWhere ID     This is your Care EveryWhere ID. This could be used by other organizations to access your [facility] medical records  [id number]        Your Vitals Were     Pulse Temperature Height Pulse Oximetry BMI (Body Mass Index)        "97 98.4  F (36.9  C) (Oral) 5' 5\" 98% 22.07 kg/m2        Blood Pressure from Last 3 Encounters:   05/17/18 106/77   02/26/18 124/71   01/31/18 118/70    Weight from Last 3 Encounters:   05/17/18 132 lb 9.6 oz   02/26/18 135 lb 12.8 oz   01/31/18 134 lb 6.4 oz              We Performed the Following     HC ANOSCOPY DX, HRA W/ BIOPSY(IES)     Surgical pathology exam        Primary Care Provider    None Specified       No primary provider on file.        Equal Access to Services     [facility]: Hadii bandar Schaffer, kendrick siddiqui, jade ward, cj winkler . So [facility] [phone].    ATENCIÓN: Si habla español, tiene a spann disposición servicios gratuitos de asistencia lingüística. Llame al [phone].    We comply with applicable federal civil rights laws and [place] laws. We do not discriminate on the basis of race, color, national origin, age, disability, sex, sexual orientation, or gender identity.            Thank you!     Thank you for choosing [facility] COLON AND RECTAL SURGERY  for your care. Our goal is always to provide you with excellent care. Hearing back from our patients is one way we can continue to improve our services. Please take a few minutes to complete the written survey that you may receive in the mail after your visit with us. Thank you!             Your Updated Medication List - Protect others around you: Learn how to safely use, store and throw away your medicines at www.disposemymeds.org.          This list is accurate as of 5/17/18 12:17 PM.  Always use your most recent med list.                   Brand Name Dispense Instructions for use Diagnosis    DAILY MULTIVITAMIN PO      Take by mouth daily        omeprazole-sodium bicarbonate  MG per capsule    ZEGERID     Take 1 capsule by mouth daily        polyethylene glycol Packet    MIRALAX/GLYCOLAX     Take 1 packet by mouth daily        VYVANSE 40 MG capsule   Generic drug:  " lisdexamfetamine      40 mg every morning

## 2018-05-17 NOTE — PROGRESS NOTES
[facility]on [facility] Rectal [facility] Clinic High Resolution Anoscopy Note    RE: Loreta Roth  : 1995  DEBBIE: 2018    Loreta Roth is a 22 year old female with a history of MAURA 3 with anal cytology on 18 who presents today for high resolution anoscopy.     HPI: Loreta has been doing well. Her constipation is better with daily Miralax. She has not yet tried a fiber supplement. She also finds that her constipation is better when she does not take her vyvanse. She had a colonoscopy with Dr. Arechiga on 3/19/18 with proctitis. She has had some ongoing abdominal pain with EGD and gallbladder US which were normal. She plans to meet with gastroenterology for this.    ASSESSMENT: Written, informed consent was obtained prior to procedure.  Prior to the start of the procedure and with procedural staff participation, I verbally confirmed the patient s identity using two indicators, relevant allergies, that the procedure was appropriate and matched the consent or emergent situation, and that the correct equipment/implants were available. Immediately prior to starting the procedure I conducted the Time Out with the procedural staff and re-confirmed the patient s name, procedure, and site/side. (The Joint Commission universal protocol was followed.)  Yes    Sedation (Moderate or Deep): None    Anal cytology was obtained with Dacron swab. Digital anal rectal exam was performed without any palpable lesions. Dilute acetic acid soak was completed for 2 minutes. Lubricant was used to insert the anoscope. Performed high resolution microscopy using the colposcope. The dentate line was viewed in its entirety. Abnormal areas noted were some mild acetowhitening throughout the anal canal without punctation. One very small area of bright acetowhitening with punctation in the left lateral position at the anal verge. After injection with 1% lidocaine with epinephrine, biopsy was obtained at this site. Fulguration was not performed but  the entire lesion was removed with biopsy. Hemostasis was obtained using Monsel solution. Continued proctitis in the rectum.  The perianal area was inspected after acetic acid soak. The findings noted were no additional lesions, acetowhitening, or punctation.     The patient tolerated the procedures well.    PLAN: Will follow up with patient with results of biopsy from today. If low grade dysplasia or normal, follow up in 6 months for repeat high resolution anoscopy. If high grade dysplasia, this was removed in clinic and she can follow up in 3 months for repeat high resolution anoscopy. Encouraged her to try a daily fiber supplement and follow up with gastroenterology as planned.  Patient's questions were answered to her stated satisfaction and she is in agreement with this plan.    For details of past medical history, surgical history, family history, medications, allergies, and review of systems, please see details below.    Medical history:  Past Medical History:   Diagnosis Date     ADHD (attention deficit hyperactivity disorder)      Constipation      Epigastric pain 01/10/2018    S/p w/u in [place] (neg US for gallstones, neg EGD for ulcers, improved w/ limited fatty foods)     Hemorrhoids      Hoarseness 9/06/17    Came with severe sore throat     MAURA III (vulvar intraepithelial neoplasia III)     Spontaneous resolution, CO2 laser 5/30/17. Vulvoscopy neg 1/31/18. [] Vulvoscopy ~8/2018        Surgical history:  Past Surgical History:   Procedure Laterality Date     LASER CO2 VULVA N/A 5/30/2017    Procedure: LASER CO2 VULVA;  Wide Local CO2 Laser Of Vulva;  Surgeon: Yaquelin Rebolledo MD;  Location:  OR       Family history:  Family History   Problem Relation Age of Onset     Hypertension Father      Anxiety Disorder Father      Depression Father      DIABETES Father      Type 1     Obesity Father      CEREBROVASCULAR DISEASE Father      Hypertension Paternal Grandmother      Depression Paternal  "Grandmother      Hypertension Paternal Grandfather      MENTAL ILLNESS Paternal Grandfather      schizophrenia     Obesity Paternal Grandfather      MENTAL ILLNESS Paternal Half-Sister      MENTAL ILLNESS Other      paranoid schizophrenia     MENTAL ILLNESS Maternal Grandmother      Substance Abuse Maternal Grandfather      Alcohol     Depression Maternal Grandfather      Depression Mother      Depression Paternal Half-Sister      DIABETES Other      Type 1     Obesity Paternal Half-Sister      Obesity Cousin        Medications:  Current Outpatient Prescriptions   Medication Sig Dispense Refill     lisdexamfetamine (VYVANSE) 40 MG capsule 40 mg every morning        Multiple Vitamins-Minerals (DAILY MULTIVITAMIN PO) Take by mouth daily        polyethylene glycol (MIRALAX/GLYCOLAX) Packet Take 1 packet by mouth daily       omeprazole-sodium bicarbonate (ZEGERID)  MG per capsule Take 1 capsule by mouth daily       Allergies:  The patientis allergic to penicillins.    Social history:  Social History   Substance Use Topics     Smoking status: Never Smoker     Smokeless tobacco: Never Used     Alcohol use 0.6 oz/week     1 Standard drinks or equivalent per week      Comment: Occasional     Marital status: single.    Review of Systems:  Nursing Notes:   Sri Fox LPN  5/17/2018 11:20 AM  Signed  Chief Complaint   Patient presents with     HRA     HRA       Vitals:    05/17/18 1118   BP: 106/77   Pulse: 97   Temp: 98.4  F (36.9  C)   TempSrc: Oral   SpO2: 98%   Weight: 132 lb 9.6 oz   Height: 5' 5\"       Body mass index is 22.07 kg/(m^2).      GELY Miller Mary, LPN  5/17/2018 11:54 AM  Signed  The following medication was given:     MEDICATION:  Lidocaine with epinephrine  ROUTE: SQ  SITE: Rectum    DOSE: 1% 30mL  LOT #: [id number]  : Hospira  EXPIRATION DATE: 2/1/2019  NDC#: [id number]   Was there drug waste? Yes  Amount of drug waste (mL): 25.  Reason for waste:  " Single use vial      Sri Fox, LPN  May 17, 2018     This procedure was performed under a collaborative agreement with Dr. Fabrice Arechiga MD, Chief of Colon and Rectal Surgery, [facility].    Radha Chakraborty NP-C  Colon and Rectal Surgery  [facility]      This note was created using speech recognition software and may contain unintended word substitutions.

## 2018-05-17 NOTE — NURSING NOTE
"Chief Complaint   Patient presents with     HRA     HRA       Vitals:    05/17/18 1118   BP: 106/77   Pulse: 97   Temp: 98.4  F (36.9  C)   TempSrc: Oral   SpO2: 98%   Weight: 132 lb 9.6 oz   Height: 5' 5\"       Body mass index is 22.07 kg/(m^2).      Sri TORRE LPN                          "

## 2018-05-18 LAB — COPATH REPORT: NORMAL

## 2018-07-24 ENCOUNTER — TRANSFERRED RECORDS (OUTPATIENT)
Dept: HEALTH INFORMATION MANAGEMENT | Facility: CLINIC | Age: 23
End: 2018-07-24

## 2018-09-13 ENCOUNTER — TELEPHONE (OUTPATIENT)
Dept: GASTROENTEROLOGY | Facility: CLINIC | Age: 23
End: 2018-09-13

## 2018-09-13 NOTE — PROGRESS NOTES
GI CLINIC VISIT    CC/REFERRING MD:  Referred Self  REASON FOR CONSULTATION: constipation and abdominal pain    ASSESSMENT/PLAN:  Loreta Roth is a 22 year old woman with a past medical history of anal itching diagnosed with MARUA 3 s/p excusion, presenting for abdominal pain and constipation.    1. Constipation  Patient has long-standing history of constipation after starting Adderall for ADHD.  She notes significant improvement in symptoms after starting MiraLAX and will have daily formed bowel movements that are easier to pass without bleeding or pain.  Would recommend patient to continue MiraLAX supplementation as she states this is working well for her. She will also have her TSH checked.  If she becomes unresponsive to MiraLAX, can consider evaluation to the pelvic floor center.  -- Continue Miralax and titrate to effect  -- TSH  -- Consider pelvic floor eval if not responsive to Miralax    2. Abdominal Pain   Patient had one episode of severe abdominal pain with improved with sucralfate and pain medications. She has had a couple of short episodes that resolved since then and have not caused significant discomfort. Endoscopy showed gastric mucosa with erosive inflammation which may have been contributing to the pain. Records from this endoscopy will be obtained.  Erosive inflammation may have improved with sucralfate and PPI therapy. Patient should continue to avoid NSAIDs as these could lead to worsening pain. Severe constipation may also be contributing to pain which will be managed as outlined above.   -- Obtain formal EGD report.    -- Patient has discontinued PPI and sucralfate, consider restarting if symptoms return and/or report indicated esophagitis (requiring ongoing PPI therapy)  -- Avoid NSAID products      RTC PRN    Thank you for this consultation.  It was a pleasure to participate in the care of this patient; please contact us with any further questions.       Alex Duran PA-C  Division of  "Gastroenterology, Hepatology and Nutrition  [facility]       JAYCE Roth is a 22 year old woman with a past medical history of anal itching diagnosed with MAURA 3 s/p excusion, presenting for constipation and abdominal pain. She had been seen in the ED in [place] 1/6/18 for severe sharp upper abdominal pain. She had a CBC, CMP, LFTs, Lipase which were normal. She also had a kidney ultrasound which did not show gallstones or gallbladder thickening with visulaized portions of the pancreas unremarkable. She was given IVF, pepcid and morphine for the pain along with zofran.  She was seen by GI who thought symptoms may have been from ulcer. She again presented to the ED 1/9/18 reporting pain after eating which improved with hyoscyamine and hydrocodone. She then had an outpatient EGD showing gastric antrum mucosa with erosive inflammation, and stromal lymphoid aggregate without H pylori.  The patient was given several medications including zegerid and sucralfate and she stated that sucralfate helped to improve her symptoms. She has since discontinued these medications. Since then, she has had a couple of short episodes of pain which resolved with a couple of doses of sucralfate and time. Most recently she had the pain yesterday which she describes as a feeling of \"hunger\" in her upper abdomen, which went away in 30 minutes without medication. She states the pain is sometimes associated with stress and does not seem to be related to food intake or bowel movements.    She recently moved to [place] for graduate school and follows with Radha Tinoco from colerectal surgery regarding perianal itching. She also noted sharp pain with bowel movements and superficial bleeding. There she reported a long-standing history of constipation which she attributes to ADHD medications starting around the age of 14 and stated she can go up to a week without having a bowel movement. The patient would " oftentimes hold her bowel movements in college due to feer of using public restrooms which caused increasing abdominal pain. The patient was  recommended fiber supplementation to bulk stools in addition to miralax to prevent anal moisture or discomfort. She also had a colonoscopy with a normal ileum, inflammation in the distal rectum with biopsies showing edema without active inflammation. Without medications, the patient has one bowel movement that is Guadalupe Scale 1 every two days and is difficult to pass with bright red blood per rectum and increased anal itching. When she takes 1/2 cap miralax at night, she will have a bristol scale 3-4 bowel movement daily. She has not found fiber supplementation and only ends up taking 2-3 tablespoons a couple of times a month. With miralax, she feels as though she can completely evacuate.       ROS:    No fevers or chills  No weight loss  No blurry vision, double vision or change in vision  + sore throat  + lymphadenopathy  No headache, paraesthesias, or weakness in a limb  + tingling in toes from shoes, some headaches   No shortness of breath or wheezing  No chest pain or pressure  No arthralgias or myalgias  No rashes or skin changes  No odynophagia or dysphagia  No BRBPR, hematochezia, melena  No dysuria, frequency or urgency  No hot/cold intolerance or polyria  + anxiety     PROBLEM LIST  Patient Active Problem List    Diagnosis Date Noted     Vulvar intraepithelial neoplasia (MAURA) grade 3 01/31/2018     Priority: Medium     At posterior fourchette diagnosed 5/2017. 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     Priority: Medium     Lesion of vulva @ posterior fourchette 03/31/2017     Priority: Medium     Biopsied 3/31/17  Results: *         PERTINENT PAST MEDICAL HISTORY:  Past Medical History:    Diagnosis Date     ADHD (attention deficit hyperactivity disorder)      Constipation      Epigastric pain 01/10/2018    S/p w/u in [place] (neg US for gallstones, neg EGD for ulcers, improved w/ limited fatty foods)     Hemorrhoids      Hoarseness 9/06/17    Came with severe sore throat     MAURA III (vulvar intraepithelial neoplasia III)     Spontaneous resolution, CO2 laser 5/30/17. Vulvoscopy neg 1/31/18. [] Vulvoscopy ~8/2018        PREVIOUS SURGERIES:  Past Surgical History:   Procedure Laterality Date     LASER CO2 VULVA N/A 5/30/2017    Procedure: LASER CO2 VULVA;  Wide Local CO2 Laser Of Vulva;  Surgeon: Yaquelin Rebolledo MD;  Location: [facility] OR       PREVIOUS ENDOSCOPY:  RECTUM, BIOPSY:   - Colorectal mucosa with edema and scattered lymphoid aggregates   - Negative for active inflammation, crypt glandular distortion and   dysplasia     ALLERGIES:     Allergies   Allergen Reactions     Penicillins Hives       PERTINENT MEDICATIONS:    Current Outpatient Prescriptions:      lisdexamfetamine (VYVANSE) 40 MG capsule, 40 mg every morning , Disp: , Rfl:      Multiple Vitamins-Minerals (DAILY MULTIVITAMIN PO), Take by mouth daily , Disp: , Rfl:      omeprazole-sodium bicarbonate (ZEGERID)  MG per capsule, Take 1 capsule by mouth daily, Disp: , Rfl:      polyethylene glycol (MIRALAX/GLYCOLAX) Packet, Take 1 packet by mouth daily, Disp: , Rfl:     SOCIAL HISTORY:  Occasional alcohol, no drugs   Social History     Social History     Marital status: Single     Spouse name: N/A     Number of children: N/A     Years of education: N/A     Occupational History     Not on file.     Social History Main Topics     Smoking status: Never Smoker     Smokeless tobacco: Never Used     Alcohol use 0.6 oz/week     1 Standard drinks or equivalent per week      Comment: Occasional     Drug use: No     Sexual activity: Yes     Partners: Male     Birth control/ protection: Pull-out method, Condom      Comment: Doesn't want  hormonal contraception d/t effect on voice (she is a maldonado)     Other Topics Concern     Not on file     Social History Narrative       FAMILY HISTORY:  FH of CRC: none   FH of IBD: none  Sister with IBS  Family History   Problem Relation Age of Onset     Hypertension Father      Anxiety Disorder Father      Depression Father      Diabetes Father      Type 1     Obesity Father      Cerebrovascular Disease Father      Hypertension Paternal Grandmother      Depression Paternal Grandmother      Hypertension Paternal Grandfather      Mental Illness Paternal Grandfather      schizophrenia     Obesity Paternal Grandfather      Mental Illness Paternal Half-Sister      Mental Illness Other      paranoid schizophrenia     Mental Illness Maternal Grandmother      Substance Abuse Maternal Grandfather      Alcohol     Depression Maternal Grandfather      Depression Mother      Depression Paternal Half-Sister      Diabetes Other      Type 1     Obesity Paternal Half-Sister      Obesity Cousin        Past/family/social history reviewed and no changes    PHYSICAL EXAMINATION:  Constitutional: aaox3, cooperative, pleasant, not dyspneic/diaphoretic, no acute distress  Vitals reviewed: /78  Pulse 74  Temp 98.3  F (36.8  C) (Oral)  Wt 60.7 kg (133 lb 12.8 oz)  LMP 08/30/2018 (Exact Date)  SpO2 100%  BMI 22.27 kg/m2  Wt:   Wt Readings from Last 2 Encounters:   09/14/18 133 lb 12.8 oz   05/17/18 132 lb 9.6 oz      Eyes: Sclera anicteric/injected  Ears/nose/mouth/throat: Normal oropharynx without ulcers or exudate, mucus membranes moist, hearing intact  Neck: supple, thyroid normal size  CV: No edema  Respiratory: Unlabored breathing  Lymph: No submandibular, supraclavicular lymphadenopathy  Abd:  Nondistended,no hepatosplenomegaly, nontender, no peritoneal signs  Skin: warm, perfused, no jaundice  Psych: Normal affect  MSK: Normal gait      PERTINENT STUDIES:    Office Visit on 05/17/2018   Component Date Value Ref Range  "Status     Copath Report 05/17/2018    Final                    Value:Patient Name: SULY YANES  MR#: [id number]  Specimen #: [id number]  Collected: 5/17/2018  Received: 5/17/2018  Reported: 5/18/2018 15:42  Ordering Phy(s): DORON ANDERSEN    For improved result formatting, select 'View Enhanced Report Format' under   Linked Documents section.    SPECIMEN(S):  Anal biopsy, left lataeral anal verge    FINAL DIAGNOSIS:  ANUS, LEFT LATERAL VERGE, BIOPSY:  - Low-grade dysplasia (AIN I)  - Negative for high grade dysplasia    COMMENT:    Dr. Townsend has reviewed the case and concurs.    I have personally reviewed all specimens and/or slides, including the   listed special stains, and used them  with my medical judgement to determine or confirm the final diagnosis.    Electronically signed out by:    Eddie Jack M.D., [facility]    CLINICAL HISTORY:  Anal lesion.  GROSS:  The specimen is received in formalin with proper patient identification,   labeled \"left lateral anal verge\" and  the specimen consists of a single, irregular skin sha                          ve measuring 0.3 x   0.3 cm.  The skin surface displays  tan-brown skin.  The resection margin is inked black.  It is  Entirely   submitted in cassette 1. (Dictated by:  Katie Moyer [facility] 5/17/2018 03:22 PM)    MICROSCOPIC:  Microscopic examination was performed.    CPT Codes:  A: 19436-YC6    TESTING LAB LOCATION:  [facility], [address]   [id number] [phone]    COLLECTION SITE:  Client: [facility]  Location: [facility] (B)    Resident  [email or web address]           Answers for HPI/ROS submitted by the patient on 9/14/2018   General Symptoms: Yes  Skin Symptoms: No  HENT Symptoms: Yes  EYE SYMPTOMS: No  HEART SYMPTOMS: No  LUNG SYMPTOMS: No  INTESTINAL SYMPTOMS: Yes  URINARY SYMPTOMS: No  GYNECOLOGIC SYMPTOMS: Yes  BREAST SYMPTOMS: " Yes  SKELETAL SYMPTOMS: No  BLOOD SYMPTOMS: Yes  NERVOUS SYSTEM SYMPTOMS: No  MENTAL HEALTH SYMPTOMS: Yes  Fever: No  Loss of appetite: No  Weight loss: Yes  Weight gain: No  Fatigue: Yes  Night sweats: No  Chills: No  Increased stress: Yes  Excessive hunger: No  Excessive thirst: No  Feeling hot or cold when others believe the temperature is normal: No  Loss of height: No  Post-operative complications: No  Surgical site pain: No  Hallucinations: No  Change in or Loss of Energy: Yes  Hyperactivity: Yes  Confusion: No  Ear pain: Yes  Ear discharge: No  Hearing loss: No  Tinnitus: Yes  Nosebleeds: No  Congestion: Yes  Sinus pain: No  Trouble swallowing: No   Voice hoarseness: Yes  Mouth sores: No  Sore throat: Yes  Tooth pain: No  Gum tenderness: No  Bleeding gums: No  Change in taste: No  Change in sense of smell: No  Dry mouth: No  Hearing aid used: No  Neck lump: No  Heart burn or indigestion: No  Nausea: Yes  Vomiting: No  Abdominal pain: Yes  Bloating: Yes  Constipation: Yes  Diarrhea: No  Blood in stool: Yes  Black stools: No  Rectal or Anal pain: Yes  Fecal incontinence: No  Yellowing of skin or eyes: No  Vomit with blood: No  Change in stools: No  Anemia: No  Swollen glands: Yes  Easy bleeding or bruising: No  Edema or swelling: No  Bleeding or spotting between periods: No  Heavy or painful periods: Yes  Irregular periods: No  Vaginal discharge: Yes  Hot flashes: No  Vaginal dryness: No  Genital ulcers: No  Reduced libido: No  Painful intercourse: No  Difficulty with sexual arousal: No  Post-menopausal bleeding: No  Discharge: No  Lumps: Yes  Pain: Yes  Nipple retraction: No  Nervous or Anxious: Yes  Depression: No  Trouble sleeping: Yes  Trouble thinking or concentrating: Yes  Mood changes: Yes  Panic attacks: No

## 2018-09-14 ENCOUNTER — OFFICE VISIT (OUTPATIENT)
Dept: GASTROENTEROLOGY | Facility: CLINIC | Age: 23
End: 2018-09-14
Payer: COMMERCIAL

## 2018-09-14 VITALS
OXYGEN SATURATION: 100 % | BODY MASS INDEX: 22.27 KG/M2 | DIASTOLIC BLOOD PRESSURE: 78 MMHG | SYSTOLIC BLOOD PRESSURE: 124 MMHG | TEMPERATURE: 98.3 F | WEIGHT: 133.8 LBS | HEART RATE: 74 BPM

## 2018-09-14 DIAGNOSIS — K59.00 CONSTIPATION, UNSPECIFIED CONSTIPATION TYPE: ICD-10-CM

## 2018-09-14 DIAGNOSIS — Z11.3 SCREENING EXAMINATION FOR VENEREAL DISEASE: ICD-10-CM

## 2018-09-14 DIAGNOSIS — K59.00 CONSTIPATION, UNSPECIFIED CONSTIPATION TYPE: Primary | ICD-10-CM

## 2018-09-14 DIAGNOSIS — Z13.21 ENCOUNTER FOR VITAMIN DEFICIENCY SCREENING: ICD-10-CM

## 2018-09-14 LAB — TSH SERPL DL<=0.005 MIU/L-ACNC: 0.76 MU/L (ref 0.4–4)

## 2018-09-14 ASSESSMENT — ENCOUNTER SYMPTOMS
SORE THROAT: 1
BLOATING: 1
SMELL DISTURBANCE: 0
SINUS PAIN: 0
BREAST PAIN: 1
RECTAL PAIN: 1
DECREASED APPETITE: 0
BREAST MASS: 1
NECK MASS: 0
SINUS CONGESTION: 1
ABDOMINAL PAIN: 1
DECREASED LIBIDO: 0
BRUISES/BLEEDS EASILY: 0
INSOMNIA: 1
POLYPHAGIA: 0
CHILLS: 0
BLOOD IN STOOL: 1
DIARRHEA: 0
HEARTBURN: 0
DECREASED CONCENTRATION: 1
SWOLLEN GLANDS: 1
ALTERED TEMPERATURE REGULATION: 0
CONSTIPATION: 1
HOARSE VOICE: 1
DEPRESSION: 0
PANIC: 0
WEIGHT GAIN: 0
WEIGHT LOSS: 1
TROUBLE SWALLOWING: 0
HALLUCINATIONS: 0
VOMITING: 0
HOT FLASHES: 0
POLYDIPSIA: 0
NIGHT SWEATS: 0
JAUNDICE: 0
FATIGUE: 1
TASTE DISTURBANCE: 0
BOWEL INCONTINENCE: 0
NERVOUS/ANXIOUS: 1
NAUSEA: 1
FEVER: 0
INCREASED ENERGY: 1

## 2018-09-14 ASSESSMENT — PAIN SCALES - GENERAL: PAINLEVEL: NO PAIN (0)

## 2018-09-14 NOTE — PATIENT INSTRUCTIONS
It was a pleasure taking care of you today.  I've included a brief summary of our discussion and care plan from today's visit below.  Please review this information with your primary care provider.  ______________________________________________________________________    My recommendations are summarized as follows:    -- drink as much water as possible  -- Miralax can be used to treat constipation and works by increasing the amount of water in the stool. Start with 1/2 caps in 8 oz of water. You may increase or decrease dose as needed   -- have your labs drawn for thyroid level   -- we will obtain your records from [place]     Return to GI Clinic as needed    ______________________________________________________________________    Who do I call with any questions after my visit?  Please be in touch if there are any further questions that arise following today's visit.  There are multiple ways to contact your gastroenterology care team.        During business hours, you may reach a Gastroenterology nurse at [phone]      To schedule or reschedule an appointment, please call [phone].       You can always send a secure message through [employer] ([employer]  [employer] messages are answered by your nurse or doctor typically within 24 hours.  Please allow extra time on weekends and holidays.        For urgent/emergent questions after business hours, you may reach the on-call GI Fellow by contacting the [facility]  at [phone].     How will I get the results of any tests ordered?    You will receive all of your results.  If you have signed up for [employer] any tests ordered at your visit will be available to you after your physician reviews them.  Typically this takes 1-2 weeks.  If there are urgent results that require a change in your care plan, your physician or nurse will call you to discuss the next steps.      What is [employer] ([employer]?  MyChart is a secure way for you to access all of your healthcare  records from the [facility].  It is a web based computer program, so you can sign on to it from any location.  It also allows you to send secure messages to your care team.  I recommend signing up for [employer] access if you have not already done so and are comfortable with using a computer.      How to I schedule a follow-up visit?  If you did not schedule a follow-up visit today, please call [phone] to schedule a follow-up office visit.        Sincerely,    Alysa Morales PA-C  Division of Gastroenterology, Hepatology & Nutrition  [facility]

## 2018-09-14 NOTE — MR AVS SNAPSHOT
After Visit Summary   9/14/2018    Loreta Roth    MRN: [id number]           Patient Information     Date Of Birth          1995        Visit Information        Provider Department      9/14/2018 1:40 PM Alex Duran PA-C [facility]        Today's Diagnoses     Constipation, unspecified constipation type    -  1      Care Instructions    It was a pleasure taking care of you today.  I've included a brief summary of our discussion and care plan from today's visit below.  Please review this information with your primary care provider.  ______________________________________________________________________    My recommendations are summarized as follows:    -- drink as much water as possible  -- Miralax can be used to treat constipation and works by increasing the amount of water in the stool. Start with 1/2 caps in 8 oz of water. You may increase or decrease dose as needed   -- have your labs drawn for thyroid level   -- we will obtain your records from [place]     Return to GI Clinic as needed    ______________________________________________________________________    Who do I call with any questions after my visit?  Please be in touch if there are any further questions that arise following today's visit.  There are multiple ways to contact your gastroenterology care team.        During business hours, you may reach a Gastroenterology nurse at [phone]      To schedule or reschedule an appointment, please call [phone].       You can always send a secure message through [employer].  [employer] messages are answered by your nurse or doctor typically within 24 hours.  Please allow extra time on weekends and holidays.        For urgent/emergent questions after business hours, you may reach the on-call GI Fellow by contacting the [facility] at [phone].     How will I get the results of any tests ordered?    You will receive all of your  results.  If you have signed up for [employer], any tests ordered at your visit will be available to you after your physician reviews them.  Typically this takes 1-2 weeks.  If there are urgent results that require a change in your care plan, your physician or nurse will call you to discuss the next steps.      What is [employer]?  [employer] is a secure way for you to access all of your healthcare records from the [facility].  It is a web based computer program, so you can sign on to it from any location.  It also allows you to send secure messages to your care team.  I recommend signing up for [employer] access if you have not already done so and are comfortable with using a computer.      How to I schedule a follow-up visit?  If you did not schedule a follow-up visit today, please call [phone] to schedule a follow-up office visit.        Sincerely,    Alysa Morales PA-C  Division of Gastroenterology, Hepatology & Nutrition  [facility]              Follow-ups after your visit        Future tests that were ordered for you today     Open Future Orders        Priority Expected Expires Ordered    TSH with free T4 reflex Routine 9/14/2018 10/14/2018 9/14/2018            Who to contact     Please call your clinic at [phone] to:    Ask questions about your health    Make or cancel appointments    Discuss your medicines    Learn about your test results    Speak to your doctor            Additional Information About Your Visit        [employer] Information     [employer] gives you secure access to your electronic health record. If you see a primary care provider, you can also send messages to your care team and make appointments. If you have questions, please call your primary care clinic.  If you do not have a primary care provider, please call [phone] and they will assist you.      [employer] is an electronic gateway that provides easy, online access to your medical records. With [employer], you can  request a clinic appointment, read your test results, renew a prescription or communicate with your care team.     To access your existing account, please contact your [facility] or call [phone] for assistance.        Care EveryWhere ID     This is your Care EveryWhere ID. This could be used by other organizations to access your [facility] medical records  [id number]        Your Vitals Were     Pulse Temperature Last Period Pulse Oximetry BMI (Body Mass Index)       74 98.3  F (36.8  C) (Oral) 08/30/2018 (Exact Date) 100% 22.27 kg/m2        Blood Pressure from Last 3 Encounters:   09/14/18 124/78   05/17/18 106/77   02/26/18 124/71    Weight from Last 3 Encounters:   09/14/18 133 lb 12.8 oz   05/17/18 132 lb 9.6 oz   02/26/18 135 lb 12.8 oz               Primary Care Provider    None Specified       No primary provider on file.        Equal Access to Services     ELISHA BARRIGA : Hadii bandar Schaffer, waalessioda chen, rodolfota kaalmada sylvia, cj winkler . So [facility] [phone].    ATENCIÓN: Si habla español, tiene a spann disposición servicios gratuitos de asistencia lingüística. Peewee al [phone].    We comply with applicable federal civil rights laws and [place] laws. We do not discriminate on the basis of race, color, national origin, age, disability, sex, sexual orientation, or gender identity.            Thank you!     Thank you for choosing [facility]  for your care. Our goal is always to provide you with excellent care. Hearing back from our patients is one way we can continue to improve our services. Please take a few minutes to complete the written survey that you may receive in the mail after your visit with us. Thank you!             Your Updated Medication List - Protect others around you: Learn how to safely use, store and throw away your medicines at www.disposemymeds.org.          This list is  accurate as of 9/14/18  2:33 PM.  Always use your most recent med list.                   Brand Name Dispense Instructions for use Diagnosis    DAILY MULTIVITAMIN PO      Take by mouth daily        omeprazole-sodium bicarbonate  MG per capsule    ZEGERID     Take 1 capsule by mouth daily        polyethylene glycol Packet    MIRALAX/GLYCOLAX     Take 1 packet by mouth daily        VYVANSE 40 MG capsule   Generic drug:  lisdexamfetamine      40 mg every morning

## 2018-09-14 NOTE — NURSING NOTE
Chief Complaint   Patient presents with     Consult     Anal Dysplasia       Vitals:    09/14/18 1334   BP: 124/78   Pulse: 74   SpO2: 100%   Weight: 133 lb 12.8 oz       Body mass index is 22.27 kg/(m^2).    Rm Marinelli on 9/14/2018 at 1:39 PM

## 2018-09-14 NOTE — LETTER
9/14/2018       RE: Loreta Roth  [address] [facility] [address]     Dear Colleague,    Thank you for referring your patient, Loreta Roth, to the [facility]IN[facility] at [facility]. Please see a copy of my visit note below.    GI CLINIC VISIT    CC/REFERRING MD:  Referred Self  REASON FOR CONSULTATION: constipation and abdominal pain    ASSESSMENT/PLAN:  Loreta Roth is a 22 year old woman with a past medical history of anal itching diagnosed with MAURA 3 s/p excusion, presenting for abdominal pain and constipation.    1. Constipation  Patient has long-standing history of constipation after starting Adderall for ADHD.  She notes significant improvement in symptoms after starting MiraLAX and will have daily formed bowel movements that are easier to pass without bleeding or pain.  Would recommend patient to continue MiraLAX supplementation as she states this is working well for her. She will also have her TSH checked.  If she becomes unresponsive to MiraLAX, can consider evaluation to the pelvic floor center.  -- Continue Miralax and titrate to effect  -- TSH  -- Consider pelvic floor eval if not responsive to Miralax    2. Abdominal Pain   Patient had one episode of severe abdominal pain with improved with sucralfate and pain medications. She has had a couple of short episodes that resolved since then and have not caused significant discomfort. Endoscopy showed gastric mucosa with erosive inflammation which may have been contributing to the pain. Records from this endoscopy will be obtained.  Erosive inflammation may have improved with sucralfate and PPI therapy. Patient should continue to avoid NSAIDs as these could lead to worsening pain. Severe constipation may also be contributing to pain which will be managed as outlined above.   -- Obtain formal EGD report.    -- Patient has discontinued PPI and sucralfate, consider restarting if symptoms  "return and/or report indicated esophagitis (requiring ongoing PPI therapy)  -- Avoid NSAID products      RTC PRN    Thank you for this consultation.  It was a pleasure to participate in the care of this patient; please contact us with any further questions.       Alex Duran PA-C  Division of Gastroenterology, Hepatology and Nutrition  [facility]       JAYCE Roth is a 22 year old woman with a past medical history of anal itching diagnosed with MAURA 3 s/p excusion, presenting for constipation and abdominal pain. She had been seen in the ED in [place] 1/6/18 for severe sharp upper abdominal pain. She had a CBC, CMP, LFTs, Lipase which were normal. She also had a kidney ultrasound which did not show gallstones or gallbladder thickening with visulaized portions of the pancreas unremarkable. She was given IVF, pepcid and morphine for the pain along with zofran.  She was seen by GI who thought symptoms may have been from ulcer. She again presented to the ED 1/9/18 reporting pain after eating which improved with hyoscyamine and hydrocodone. She then had an outpatient EGD showing gastric antrum mucosa with erosive inflammation, and stromal lymphoid aggregate without H pylori.  The patient was given several medications including zegerid and sucralfate and she stated that sucralfate helped to improve her symptoms. She has since discontinued these medications. Since then, she has had a couple of short episodes of pain which resolved with a couple of doses of sucralfate and time. Most recently she had the pain yesterday which she describes as a feeling of \"hunger\" in her upper abdomen, which went away in 30 minutes without medication. She states the pain is sometimes associated with stress and does not seem to be related to food intake or bowel movements.    She recently moved to [place] for graduate school and follows with Radha Tinoco from [facility] regarding perianal itching. " She also noted sharp pain with bowel movements and superficial bleeding. There she reported a long-standing history of constipation which she attributes to ADHD medications starting around the age of 14 and stated she can go up to a week without having a bowel movement. The patient would oftentimes hold her bowel movements in college due to feer of using public restrooms which caused increasing abdominal pain. The patient was  recommended fiber supplementation to bulk stools in addition to miralax to prevent anal moisture or discomfort. She also had a colonoscopy with a normal ileum, inflammation in the distal rectum with biopsies showing edema without active inflammation. Without medications, the patient has one bowel movement that is Calcasieu Scale 1 every two days and is difficult to pass with bright red blood per rectum and increased anal itching. When she takes 1/2 cap miralax at night, she will have a bristol scale 3-4 bowel movement daily. She has not found fiber supplementation and only ends up taking 2-3 tablespoons a couple of times a month. With miralax, she feels as though she can completely evacuate.       ROS:    No fevers or chills  No weight loss  No blurry vision, double vision or change in vision  + sore throat  + lymphadenopathy  No headache, paraesthesias, or weakness in a limb  + tingling in toes from shoes, some headaches   No shortness of breath or wheezing  No chest pain or pressure  No arthralgias or myalgias  No rashes or skin changes  No odynophagia or dysphagia  No BRBPR, hematochezia, melena  No dysuria, frequency or urgency  No hot/cold intolerance or polyria  + anxiety     PROBLEM LIST  Patient Active Problem List    Diagnosis Date Noted     Vulvar intraepithelial neoplasia (MAURA) grade 3 01/31/2018     Priority: Medium     At posterior fourchette diagnosed 5/2017. Scheduled for [facility] but in OR no lesion, re-bx neg for malignancy  Plan:   [x] 6mo vulvoscopy - done 1/31/18, bx ___  [ ] 12  mo vulvoscopy       Dysphonia 09/27/2017     Priority: Medium     Vocal fold edema 09/27/2017     Priority: Medium     Lesion of vulva @ posterior fourchette 03/31/2017     Priority: Medium     Biopsied 3/31/17  Results: *         PERTINENT PAST MEDICAL HISTORY:  Past Medical History:   Diagnosis Date     ADHD (attention deficit hyperactivity disorder)      Constipation      Epigastric pain 01/10/2018    S/p w/u in [place] (neg US for gallstones, neg EGD for ulcers, improved w/ limited fatty foods)     Hemorrhoids      Hoarseness 9/06/17    Came with severe sore throat     MAURA III (vulvar intraepithelial neoplasia III)     Spontaneous resolution, CO2 laser 5/30/17. Vulvoscopy neg 1/31/18. [] Vulvoscopy ~8/2018        PREVIOUS SURGERIES:  Past Surgical History:   Procedure Laterality Date     LASER CO2 VULVA N/A 5/30/2017    Procedure: LASER CO2 VULVA;  Wide Local CO2 Laser Of Vulva;  Surgeon: Yaquelin Rebolledo MD;  Location: [facility] OR       PREVIOUS ENDOSCOPY:  RECTUM, BIOPSY:   - Colorectal mucosa with edema and scattered lymphoid aggregates   - Negative for active inflammation, crypt glandular distortion and   dysplasia     ALLERGIES:     Allergies   Allergen Reactions     Penicillins Hives       PERTINENT MEDICATIONS:    Current Outpatient Prescriptions:      lisdexamfetamine (VYVANSE) 40 MG capsule, 40 mg every morning , Disp: , Rfl:      Multiple Vitamins-Minerals (DAILY MULTIVITAMIN PO), Take by mouth daily , Disp: , Rfl:      omeprazole-sodium bicarbonate (ZEGERID)  MG per capsule, Take 1 capsule by mouth daily, Disp: , Rfl:      polyethylene glycol (MIRALAX/GLYCOLAX) Packet, Take 1 packet by mouth daily, Disp: , Rfl:     SOCIAL HISTORY:  Occasional alcohol, no drugs   Social History     Social History     Marital status: Single     Spouse name: N/A     Number of children: N/A     Years of education: N/A     Occupational History     Not on file.     Social History Main Topics     Smoking status: Never  Smoker     Smokeless tobacco: Never Used     Alcohol use 0.6 oz/week     1 Standard drinks or equivalent per week      Comment: Occasional     Drug use: No     Sexual activity: Yes     Partners: Male     Birth control/ protection: Pull-out method, Condom      Comment: Doesn't want hormonal contraception d/t effect on voice (she is a maldonado)     Other Topics Concern     Not on file     Social History Narrative       FAMILY HISTORY:  FH of CRC: none   FH of IBD: none  Sister with IBS  Family History   Problem Relation Age of Onset     Hypertension Father      Anxiety Disorder Father      Depression Father      Diabetes Father      Type 1     Obesity Father      Cerebrovascular Disease Father      Hypertension Paternal Grandmother      Depression Paternal Grandmother      Hypertension Paternal Grandfather      Mental Illness Paternal Grandfather      schizophrenia     Obesity Paternal Grandfather      Mental Illness Paternal Half-Sister      Mental Illness Other      paranoid schizophrenia     Mental Illness Maternal Grandmother      Substance Abuse Maternal Grandfather      Alcohol     Depression Maternal Grandfather      Depression Mother      Depression Paternal Half-Sister      Diabetes Other      Type 1     Obesity Paternal Half-Sister      Obesity Cousin        Past/family/social history reviewed and no changes    PHYSICAL EXAMINATION:  Constitutional: aaox3, cooperative, pleasant, not dyspneic/diaphoretic, no acute distress  Vitals reviewed: /78  Pulse 74  Temp 98.3  F (36.8  C) (Oral)  Wt 60.7 kg (133 lb 12.8 oz)  LMP 08/30/2018 (Exact Date)  SpO2 100%  BMI 22.27 kg/m2  Wt:   Wt Readings from Last 2 Encounters:   09/14/18 133 lb 12.8 oz   05/17/18 132 lb 9.6 oz      Eyes: Sclera anicteric/injected  Ears/nose/mouth/throat: Normal oropharynx without ulcers or exudate, mucus membranes moist, hearing intact  Neck: supple, thyroid normal size  CV: No edema  Respiratory: Unlabored breathing  Lymph: No  "submandibular, supraclavicular lymphadenopathy  Abd:  Nondistended,no hepatosplenomegaly, nontender, no peritoneal signs  Skin: warm, perfused, no jaundice  Psych: Normal affect  MSK: Normal gait      PERTINENT STUDIES:    Office Visit on 05/17/2018   Component Date Value Ref Range Status     Copath Report 05/17/2018    Final                    Value:Patient Name: SULY YANES  MR#: [id number]  Specimen #: [id number]  Collected: 5/17/2018  Received: 5/17/2018  Reported: 5/18/2018 15:42  Ordering Phy(s): DORON ANDERSEN    For improved result formatting, select 'View Enhanced Report Format' under   Linked Documents section.    SPECIMEN(S):  Anal biopsy, left lataeral anal verge    FINAL DIAGNOSIS:  ANUS, LEFT LATERAL VERGE, BIOPSY:  - Low-grade dysplasia (AIN I)  - Negative for high grade dysplasia    COMMENT:    Dr. Townsend has reviewed the case and concurs.    I have personally reviewed all specimens and/or slides, including the   listed special stains, and used them  with my medical judgement to determine or confirm the final diagnosis.    Electronically signed out by:    Eddie Jack M.D., [facility]    CLINICAL HISTORY:  Anal lesion.  GROSS:  The specimen is received in formalin with proper patient identification,   labeled \"left lateral anal verge\" and  the specimen consists of a single, irregular skin sha                          ve measuring 0.3 x   0.3 cm.  The skin surface displays  tan-brown skin.  The resection margin is inked black.  It is  Entirely   submitted in cassette 1. (Dictated by:  Katie [facility] 5/17/2018 03:22 PM)    MICROSCOPIC:  Microscopic examination was performed.    CPT Codes:  A: 07138-PG5    TESTING LAB LOCATION:  [facility], [address]   [address] [phone]    COLLECTION SITE:  Client: [facility]  Location: [facility]" (B)    Resident  [email or web address]           Again, thank you for allowing me to participate in the care of your patient.      Sincerely,    Alex Duran PA-C

## 2018-09-17 ENCOUNTER — TELEPHONE (OUTPATIENT)
Dept: OBGYN | Facility: CLINIC | Age: 23
End: 2018-09-17

## 2018-09-17 LAB
DEPRECATED CALCIDIOL+CALCIFEROL SERPL-MC: 26 UG/L (ref 20–75)
HBV SURFACE AG SERPL QL IA: NONREACTIVE
HIV 1+2 AB+HIV1 P24 AG SERPL QL IA: NONREACTIVE
T PALLIDUM AB SER QL: NONREACTIVE

## 2018-09-17 NOTE — TELEPHONE ENCOUNTER
Telephone encounter    Called patient at mobile phone listed and patient answered and identified self.      We discussed her results of her labs (initially ordered in Jan 2018), but now run since she had her TSH checked and returned negative.  We discussed her normal Vit D level.     Per Dr. Salinas's note, she should return to clinic in 6 mo for repeat vulvoscopy.  Instructed patient re: follow-up.  She prefers to call clinic and did not want RN to call to schedule appt.    Given phone # for clinic.     Linda Ortiz MD  PGY4  OB/GYN

## 2018-10-16 ENCOUNTER — DOCUMENTATION ONLY (OUTPATIENT)
Dept: GASTROENTEROLOGY | Facility: CLINIC | Age: 23
End: 2018-10-16

## 2018-10-16 ENCOUNTER — HOSPITAL ENCOUNTER (EMERGENCY)
Facility: CLINIC | Age: 23
Discharge: HOME | End: 2018-10-16
Attending: FAMILY MEDICINE | Admitting: FAMILY MEDICINE
Payer: COMMERCIAL

## 2018-10-16 VITALS
WEIGHT: 133 LBS | DIASTOLIC BLOOD PRESSURE: 91 MMHG | BODY MASS INDEX: 22.13 KG/M2 | HEART RATE: 117 BPM | SYSTOLIC BLOOD PRESSURE: 124 MMHG | RESPIRATION RATE: 16 BRPM | OXYGEN SATURATION: 100 % | TEMPERATURE: 98.5 F

## 2018-10-16 DIAGNOSIS — R04.0 EPISTAXIS: ICD-10-CM

## 2018-10-16 LAB
APTT PPP: 31 SEC (ref 22–37)
BASOPHILS # BLD AUTO: 0 10E9/L (ref 0–0.2)
BASOPHILS NFR BLD AUTO: 0.5 %
DIFFERENTIAL METHOD BLD: ABNORMAL
EOSINOPHIL # BLD AUTO: 0 10E9/L (ref 0–0.7)
EOSINOPHIL NFR BLD AUTO: 1 %
ERYTHROCYTE [DISTWIDTH] IN BLOOD BY AUTOMATED COUNT: 12.2 % (ref 10–15)
HCT VFR BLD AUTO: 44 % (ref 35–47)
HGB BLD-MCNC: 15 G/DL (ref 11.7–15.7)
IMM GRANULOCYTES # BLD: 0 10E9/L (ref 0–0.4)
IMM GRANULOCYTES NFR BLD: 0 %
INR PPP: 1.07 (ref 0.86–1.14)
LYMPHOCYTES # BLD AUTO: 1.5 10E9/L (ref 0.8–5.3)
LYMPHOCYTES NFR BLD AUTO: 37.4 %
MCH RBC QN AUTO: 30.1 PG (ref 26.5–33)
MCHC RBC AUTO-ENTMCNC: 34.1 G/DL (ref 31.5–36.5)
MCV RBC AUTO: 88 FL (ref 78–100)
MONOCYTES # BLD AUTO: 0.2 10E9/L (ref 0–1.3)
MONOCYTES NFR BLD AUTO: 4.1 %
NEUTROPHILS # BLD AUTO: 2.2 10E9/L (ref 1.6–8.3)
NEUTROPHILS NFR BLD AUTO: 57 %
NRBC # BLD AUTO: 0 10*3/UL
NRBC BLD AUTO-RTO: 0 /100
PLATELET # BLD AUTO: 224 10E9/L (ref 150–450)
RBC # BLD AUTO: 4.99 10E12/L (ref 3.8–5.2)
WBC # BLD AUTO: 3.9 10E9/L (ref 4–11)

## 2018-10-16 PROCEDURE — 99283 EMERGENCY DEPT VISIT LOW MDM: CPT | Performed by: FAMILY MEDICINE

## 2018-10-16 PROCEDURE — 85025 COMPLETE CBC W/AUTO DIFF WBC: CPT | Performed by: FAMILY MEDICINE

## 2018-10-16 PROCEDURE — 85610 PROTHROMBIN TIME: CPT | Performed by: FAMILY MEDICINE

## 2018-10-16 PROCEDURE — 36415 COLL VENOUS BLD VENIPUNCTURE: CPT | Performed by: FAMILY MEDICINE

## 2018-10-16 PROCEDURE — 85730 THROMBOPLASTIN TIME PARTIAL: CPT | Performed by: FAMILY MEDICINE

## 2018-10-16 PROCEDURE — 99282 EMERGENCY DEPT VISIT SF MDM: CPT | Mod: Z6 | Performed by: FAMILY MEDICINE

## 2018-10-16 NOTE — ED PROVIDER NOTES
"  History     Chief Complaint   Patient presents with     Epistaxis     Nose bleed started about 40 minutes ago.  No blood thinners.  Nose clip applied and large clot came out of right nares.  States she feels like bleeding has slowed down.     JAYCE Roth is a 22 year old female who presents for evaluation of epistaxis. Patient reports an episode 40 minutes prior to arrival that initially began in her right nostril. She feels the bleeding eventually migrated to her left but is unsure as she was \"gushing blood\". Patient notes she had an episode of epistaxis last week as well, but states it lasted much shorter, only about 5-10 minutes. She denies trauma to her nose other than the occasional wiping with tissues due to irritation or allergies. Patient denies recent cold, rhinorrhea, or sneezing. She notes that recently, she has felt more \"dry\" and dehydrated due to the weather changes, stating that yesterday, her throat was burning d/t dehydration. Currently, at time of evaluation, patient has used a nose clip for the past 15 minutes. She denies postnasal drip and states her bleeding has subsided.    Past Medical History:   Diagnosis Date     ADHD (attention deficit hyperactivity disorder)      Constipation      Epigastric pain 01/10/2018    S/p w/u in [place] (neg US for gallstones, neg EGD for ulcers, improved w/ limited fatty foods)     Hemorrhoids      Hoarseness 9/06/17    Came with severe sore throat     MAURA III (vulvar intraepithelial neoplasia III)     Spontaneous resolution, CO2 laser 5/30/17. Vulvoscopy neg 1/31/18. [] Vulvoscopy ~8/2018        Past Surgical History:   Procedure Laterality Date     LASER CO2 VULVA N/A 5/30/2017    Procedure: LASER CO2 VULVA;  Wide Local CO2 Laser Of Vulva;  Surgeon: Yaquelin Rebolledo MD;  Location: [facility] OR       Family History   Problem Relation Age of Onset     Hypertension Father      Anxiety Disorder Father      Depression Father      Diabetes Father      Type 1 "     Obesity Father      Cerebrovascular Disease Father      Hypertension Paternal Grandmother      Depression Paternal Grandmother      Hypertension Paternal Grandfather      Mental Illness Paternal Grandfather      schizophrenia     Obesity Paternal Grandfather      Mental Illness Paternal Half-Sister      Mental Illness Other      paranoid schizophrenia     Mental Illness Maternal Grandmother      Substance Abuse Maternal Grandfather      Alcohol     Depression Maternal Grandfather      Depression Mother      Depression Paternal Half-Sister      Diabetes Other      Type 1     Obesity Paternal Half-Sister      Obesity Cousin        Social History   Substance Use Topics     Smoking status: Never Smoker     Smokeless tobacco: Never Used     Alcohol use 0.6 oz/week     1 Standard drinks or equivalent per week      Comment: Occasional       No current facility-administered medications for this encounter.      Current Outpatient Prescriptions   Medication     lisdexamfetamine (VYVANSE) 40 MG capsule     Multiple Vitamins-Minerals (DAILY MULTIVITAMIN PO)     polyethylene glycol (MIRALAX/GLYCOLAX) Packet     omeprazole-sodium bicarbonate (ZEGERID)  MG per capsule        Allergies   Allergen Reactions     Penicillins Hives       I have reviewed the Medications, Allergies, Past Medical and Surgical History, and Social History in the Epic system.    Review of Systems  All other systems were reviewed and are negative    Physical Exam   BP: (!) 124/91  Pulse: 117  Temp: 98.5  F (36.9  C)  Resp: 16  Weight: 60.3 kg (133 lb)  SpO2: 100 %      Physical Exam   Constitutional: She is oriented to person, place, and time. She appears well-developed and well-nourished. No distress.   HENT:   Head: Normocephalic.   Eyes: Pupils are equal, round, and reactive to light.   Neck: Neck supple.   Cardiovascular: Normal rate.    Pulmonary/Chest: Effort normal.   Neurological: She is alert and oriented to person, place, and time.    Skin: Skin is warm and dry. No rash noted.   Psychiatric: She has a normal mood and affect.       ED Course     ED Course     Procedures       11:14 AM  The patient was seen and examined by Dr. Alonso in Room 4.          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  On exam she was initially tachycardic, heart rate return to normal while in the ED without intervention.  Bleeding stopped with direct pressure.  Her platelets and coags are normal as is her hemoglobin.  On exam there is no obvious source of bleeding and no recurrent bleeding in over an hour.  She would prefer to prevent intervention such as cauterization or packing since there is no active recurrent bleeding.  She was advised to ways to keep her nasal tissue moist, avoiding trauma to the nose, and she will follow-up with ENT for further evaluation of her recurrent epistaxis.  We discussed the indications for emergency department return and follow-up.  Stable for discharge.      I have reviewed the nursing notes.    I have reviewed the findings, diagnosis, plan and need for follow up with the patient.    New Prescriptions    No medications on file       Final diagnoses:   Epistaxis   Gideon RICH, am serving as a trained medical scribe to document services personally performed by Levi Alonso MD, based on the provider's statements to me.   Levi RICH MD, was physically present and have reviewed and verified the accuracy of this note documented by Gideon Garcia.      10/16/2018   [facility],  EMERGENCY DEPARTMENT     Eddie Alonso MD  10/16/18 3548

## 2018-10-16 NOTE — ED AVS SNAPSHOT
[facility], Emergency Department    [address]    [place] [address]    Phone:  [phone]    Fax:  [phone]                                       Loreta Roth   MRN: [id number]    Department:  [facility], Emergency Department   Date of Visit:  10/16/2018           Patient Information     Date Of Birth          1995        Your diagnoses for this visit were:     Epistaxis        You were seen by Eddie Alonso MD.        Discharge Instructions       Thank you for choosing [facility].     Please closely monitor for further symptoms. Return to the Emergency Department if you develop any new or worsening signs or symptoms.    If you received any opiate pain medications or sedatives during your visit, please do not drive for at least 8 hours.     Labs, cultures or final xray interpretations may still need to be reviewed.  We will call you if your plan of care needs to be changed.    Please make an appointment to follow up with ENT, [phone] in the next 7-10 days.        Nosebleed (Adult)    Bleeding from the nose most commonly occurs because of injury or drying and cracking of the inner lining of the nose. Most nosebleeds are because of dry air or nose-picking. They can occur during a common cold or an allergy attack. They can also occur on a very hot day, or from dry air in the winter.  If the bleeding site is found, it may be cauterized. This means it is treated to cause a blood clot to form. This may be done with a chemical, heat, or electricity. If the bleeding continues after the site is cauterized, or if the site cannot be found, packing may be put in your nose. This is to apply pressure and stop the bleeding. The packing may be made of gauze or sponge. A small balloon catheter is sometimes used. These must be removed by your healthcare provider. Some types of packing dissolve on their own. If you are taking blood thinning (anticoagulant) medicine, you  may have a blood test.  Home care    If packing was put in your nose, unless told otherwise, do not pull on it or try to remove it yourself. You will be given an appointment to have it removed. You may also have been given antibiotics to prevent a sinus infection. If so, finish all of the medicine.    Don't blow your nose for 12 hours after the bleeding stops. This will allow a strong blood clot to form. Don't pick your nose. This may restart bleeding.    Don't drink alcohol or hot liquids for the next 2 days. Alcohol or hot liquids in your mouth can dilate blood vessels in your nose. This can cause bleeding to start again.    Don't take ibuprofen, naproxen, or medicines that contain aspirin. These thin the blood and may cause your nose to bleed. You may take acetaminophen for pain, unless another pain medicine was prescribed.    If the bleeding starts again, sit up and lean forward to prevent swallowing blood. Pinch your nose tightly on both sides, as shown above, for 10 to 15 minutes. Time yourself. Don t release the pressure on your nose until 10 minutes is up. If bleeding does not stop, continue to pinch your nose and call your healthcare provider or return to this facility.    If you have a cold, allergies, or dry nasal membranes, lubricate the nasal passages. Apply a small amount of petroleum jelly inside the nose with a cotton swab twice a day (morning and night).    Don't overheat your home. This can dry the air and make your condition worse.    Put a humidifier in the room where you sleep. This will add moisture to the air. Clean the humidifier as advised by the .    Use a saline nasal spray to keep nasal passages moist.    Don't pick your nose. Keep fingernails trimmed to decrease risk of bleeds.    Don't smoke.  Follow-up care  Follow up with your healthcare provider, or as advised. Nasal packing should be rechecked or removed within 2 to 3 days.  When to seek medical advice  Call your  healthcare provider right away if any of these occur.    You have another nosebleed that you cannot control    Dizziness, weakness, or fainting    You become tired or confused    Fever of 100.4 F (38 C) or higher, or as directed by your healthcare provider    Headache    Sinus or facial pain    Shortness of breath or trouble breathing  Date Last Reviewed: 11/1/2017 2000-2017 [employer]. [address] [place] [address]. All rights reserved. This information is not intended as a substitute for professional medical care. Always follow your healthcare professional's instructions.          Your next 10 appointments already scheduled     Nov 01, 2018  1:15 PM CDT   Return Visit with Jia Salinas MD   [facility] ([facility])    [facility]  [address]  [address] [facility] [address] [phone]              24 Hour Appointment Hotline       To make an appointment at any [facility], call [phone] ([phone]). If you don't have a family doctor or clinic, we will help you find one. [facility] are conveniently located to serve the needs of you and your family.             Review of your medicines      Our records show that you are taking the medicines listed below. If these are incorrect, please call your family doctor or clinic.        Dose / Directions Last dose taken    DAILY MULTIVITAMIN PO        Take by mouth daily   Refills:  0        omeprazole-sodium bicarbonate  MG per capsule   Commonly known as:  ZEGERID   Dose:  1 capsule        Take 1 capsule by mouth daily   Refills:  0        polyethylene glycol Packet   Commonly known as:  MIRALAX/GLYCOLAX   Dose:  1 packet        Take 1 packet by mouth daily   Refills:  0        VYVANSE 40 MG capsule   Dose:  40 mg   Generic drug:  lisdexamfetamine        40 mg every morning   Refills:  0                Procedures and tests performed during  your visit     CBC with platelets differential    INR    PTT      Orders Needing Specimen Collection     None      Pending Results     No orders found from 10/14/2018 to 10/17/2018.            Pending Culture Results     No orders found from 10/14/2018 to 10/17/2018.            Pending Results Instructions     If you had any lab results that were not finalized at the time of your Discharge, you can call the ED Lab Result RN at [phone]. You will be contacted by this team for any positive Lab results or changes in treatment. The nurses are available 7 days a week from 10A to 6:30P.  You can leave a message 24 hours per day and they will return your call.        Thank you for choosing [facility]       Thank you for choosing [facility] for your care. Our goal is always to provide you with excellent care. Hearing back from our patients is one way we can continue to improve our services. Please take a few minutes to complete the written survey that you may receive in the mail after you visit with us. Thank you!        [employer] Information     [employer] gives you secure access to your electronic health record. If you see a primary care provider, you can also send messages to your care team and make appointments. If you have questions, please call your primary care clinic.  If you do not have a primary care provider, please call [phone] and they will assist you.        Care EveryWhere ID     This is your Care EveryWhere ID. This could be used by other organizations to access your [facility] medical records  [id number]        Equal Access to Services     ELISHA BARRIGA : Hadii bandar Schaffer, kendrick siddiqui, qaybta cj cates. So [facility] [phone].    ATENCIÓN: Si habla español, tiene a spann disposición servicios gratuitos de asistencia lingüística. Peewee al [phone].    We comply with applicable federal civil rights laws and [place] laws. We do not  discriminate on the basis of race, color, national origin, age, disability, sex, sexual orientation, or gender identity.            After Visit Summary       This is your record. Keep this with you and show to your community pharmacist(s) and doctor(s) at your next visit.

## 2018-10-16 NOTE — DISCHARGE INSTRUCTIONS
Thank you for choosing [facility].     Please closely monitor for further symptoms. Return to the Emergency Department if you develop any new or worsening signs or symptoms.    If you received any opiate pain medications or sedatives during your visit, please do not drive for at least 8 hours.     Labs, cultures or final xray interpretations may still need to be reviewed.  We will call you if your plan of care needs to be changed.    Please make an appointment to follow up with ENT, [phone] in the next 7-10 days.        Nosebleed (Adult)    Bleeding from the nose most commonly occurs because of injury or drying and cracking of the inner lining of the nose. Most nosebleeds are because of dry air or nose-picking. They can occur during a common cold or an allergy attack. They can also occur on a very hot day, or from dry air in the winter.  If the bleeding site is found, it may be cauterized. This means it is treated to cause a blood clot to form. This may be done with a chemical, heat, or electricity. If the bleeding continues after the site is cauterized, or if the site cannot be found, packing may be put in your nose. This is to apply pressure and stop the bleeding. The packing may be made of gauze or sponge. A small balloon catheter is sometimes used. These must be removed by your healthcare provider. Some types of packing dissolve on their own. If you are taking blood thinning (anticoagulant) medicine, you may have a blood test.  Home care    If packing was put in your nose, unless told otherwise, do not pull on it or try to remove it yourself. You will be given an appointment to have it removed. You may also have been given antibiotics to prevent a sinus infection. If so, finish all of the medicine.    Don't blow your nose for 12 hours after the bleeding stops. This will allow a strong blood clot to form. Don't pick your nose. This may restart bleeding.    Don't drink alcohol or  hot liquids for the next 2 days. Alcohol or hot liquids in your mouth can dilate blood vessels in your nose. This can cause bleeding to start again.    Don't take ibuprofen, naproxen, or medicines that contain aspirin. These thin the blood and may cause your nose to bleed. You may take acetaminophen for pain, unless another pain medicine was prescribed.    If the bleeding starts again, sit up and lean forward to prevent swallowing blood. Pinch your nose tightly on both sides, as shown above, for 10 to 15 minutes. Time yourself. Don t release the pressure on your nose until 10 minutes is up. If bleeding does not stop, continue to pinch your nose and call your healthcare provider or return to this facility.    If you have a cold, allergies, or dry nasal membranes, lubricate the nasal passages. Apply a small amount of petroleum jelly inside the nose with a cotton swab twice a day (morning and night).    Don't overheat your home. This can dry the air and make your condition worse.    Put a humidifier in the room where you sleep. This will add moisture to the air. Clean the humidifier as advised by the .    Use a saline nasal spray to keep nasal passages moist.    Don't pick your nose. Keep fingernails trimmed to decrease risk of bleeds.    Don't smoke.  Follow-up care  Follow up with your healthcare provider, or as advised. Nasal packing should be rechecked or removed within 2 to 3 days.  When to seek medical advice  Call your healthcare provider right away if any of these occur.    You have another nosebleed that you cannot control    Dizziness, weakness, or fainting    You become tired or confused    Fever of 100.4 F (38 C) or higher, or as directed by your healthcare provider    Headache    Sinus or facial pain    Shortness of breath or trouble breathing  Date Last Reviewed: 11/1/2017 2000-2017 [employer]. [address] [place] [address], [place] [address]. All rights reserved. This information  is not intended as a substitute for professional medical care. Always follow your healthcare professional's instructions.

## 2018-10-16 NOTE — ED AVS SNAPSHOT
[facility], [place], Emergency Department    [address]    [place] [address]    Phone:  [phone]    Fax:  [phone]                                       Loreta Roth   MRN: [id number]    Department:  [facility], Emergency Department   Date of Visit:  10/16/2018           After Visit Summary Signature Page     I have received my discharge instructions, and my questions have been answered. I have discussed any challenges I see with this plan with the nurse or doctor.    ..........................................................................................................................................  Patient/Patient Representative Signature      ..........................................................................................................................................  Patient Representative Print Name and Relationship to Patient    ..................................................               ................................................  Date                                   Time    ..........................................................................................................................................  Reviewed by Signature/Title    ...................................................              ..............................................  Date                                               Time          22EPIC Rev 08/18

## 2018-10-31 NOTE — PATIENT INSTRUCTIONS
Vulvoscopy Post-Procedure Patient Instructions      You may experience any of the following for a couple of days following vulvoscopy:    Pain at the site of biopsy    Vaginal bleeding     Vaginal discharge that looks black and clumpy    Please call your healthcare provider if you have any of the following symptoms:    Fever--Temperature greater than 100 degrees    Increasing drainage from biopsy site (if any) after more than 48 hours    Bleeding heavier than a normal period in the first 24-48 hours    If you are bleeding and soaking more than one pad an hour    Or any other worrisome problems.    We recommend that:    You use pads for the bleeding.    You may resume sexual activity in 2-3 days, or after bleeding stops.    You may use Tylenol or ibuprofen (Motrin or Advil) for any discomfort.

## 2018-10-31 NOTE — PROGRESS NOTES
"[facility] Clinic Note    S: Ms. 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 She is now undergoing q6mo surveillance with vulvoscopy, last was 18.  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 She was instructed to return in 6mo for f/u.  She reports same partner, Nilton, and continues to intermittently have pain at her introitus with intercourse. Thinks it could be a lubrication problem, has tried one lubricating gel that was burning and hasn't found a good one. Also notes a lot of itching still, does help when she takes miralax and pays attention to anal leaking issue discussed with colorectal.  She reports taking Plan B recently with subsequent irregular bleeding. Does use condoms regularly but broke this time and rarely they are \"irresponsible\" and don't use one. 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    O:  /73  Pulse 72  Wt 60.6 kg (133 lb 8 oz)  LMP 10/15/2018  Breastfeeding? No  BMI 22.22 kg/m2     Vulvoscopy Procedure  Verified patient name, , procedure.     Discussed risks (discomfort, bleeding, pain, infection), benefits and alternatives. Pt agreed to proceed. Written informed consent signed.     Procedure: Acetic acid was applied to the vulva with soaked pad and allowed to sit for 3min. The patient was positioned in the dorsal lithotomy position and pad removed, vulva inspected carefully for acetowhite change. Papules of acetowhite change at posterior fourchette, particularly surrounding hair follicles that appear mildly inflammed. There is a flatter lesions of acetowhite change at 5:30 of the introitus. The area was infiltrated with 1% plain lidocaine. This was biopsied (3mm punch) as " well as midline posterior fourchette. Biopsies sent to pathology. Bleeding controlled with silver nitrate.     A/P: Ms. 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 Last HPV vaccination given s/p dx of VIN3.  The patient tolerated the procedure well. I will personally call her with results and recommendations for follow up.  Discussed ACOG recommendations for q6mo vulvoscopy x 5 years, though this is likely overkill for her given spontaneous resolution of VIN3 and young age without immunocompromise. Discussed if this biopsies from today are negative can space to annual vulvoscopy with annual exam, and she would like to think about out, leaning towards coming every 6mo though given anxiety and pain at introitus with intercourse would rather be reassured which is completely reasonable.    Discussed that pap smears were normal for 2 years in a row surrounding this diagnosis and thus recommend return to routine screening, I.e. q3yrs with the next due 1/2021.    Rx for Plan B given with 1 refill. Discussed copper IUD again briefly.    Pt seen and d/w Dr Rebolledo.    Clara Hdz MD PGY3  11/1/18    The Patient was seen in [facility] by CLARA HDZ.  I performed vulvoscopy and supervised the biopsy.   I reviewed the history & exam. Assessment and plan were jointly made.    Yaquelin Rebolledo MD

## 2018-11-01 ENCOUNTER — OFFICE VISIT (OUTPATIENT)
Dept: OBGYN | Facility: CLINIC | Age: 23
End: 2018-11-01
Attending: STUDENT IN AN ORGANIZED HEALTH CARE EDUCATION/TRAINING PROGRAM
Payer: COMMERCIAL

## 2018-11-01 VITALS
BODY MASS INDEX: 22.22 KG/M2 | SYSTOLIC BLOOD PRESSURE: 109 MMHG | WEIGHT: 133.5 LBS | DIASTOLIC BLOOD PRESSURE: 73 MMHG | HEART RATE: 72 BPM

## 2018-11-01 DIAGNOSIS — Z01.812 PRE-PROCEDURE LAB EXAM: ICD-10-CM

## 2018-11-01 DIAGNOSIS — Z30.012 ENCOUNTER FOR PRESCRIPTION OF EMERGENCY CONTRACEPTION: ICD-10-CM

## 2018-11-01 DIAGNOSIS — D07.1 VULVAR INTRAEPITHELIAL NEOPLASIA (VIN) GRADE 3: Primary | ICD-10-CM

## 2018-11-01 LAB
HCG UR QL: NEGATIVE
INTERNAL QC OK POCT: YES

## 2018-11-01 PROCEDURE — 81025 URINE PREGNANCY TEST: CPT | Mod: ZF | Performed by: STUDENT IN AN ORGANIZED HEALTH CARE EDUCATION/TRAINING PROGRAM

## 2018-11-01 PROCEDURE — 88305 TISSUE EXAM BY PATHOLOGIST: CPT | Performed by: STUDENT IN AN ORGANIZED HEALTH CARE EDUCATION/TRAINING PROGRAM

## 2018-11-01 PROCEDURE — 56821 COLPOSCOPY VULVA W/BIOPSY: CPT | Mod: ZF | Performed by: STUDENT IN AN ORGANIZED HEALTH CARE EDUCATION/TRAINING PROGRAM

## 2018-11-01 PROCEDURE — G0463 HOSPITAL OUTPT CLINIC VISIT: HCPCS | Mod: 25,ZF

## 2018-11-01 RX ORDER — LEVONORGESTREL 1.5 MG/1
1.5 TABLET ORAL ONCE
Qty: 1 TABLET | Refills: 1 | Status: SHIPPED | OUTPATIENT
Start: 2018-11-01 | End: 2019-11-04

## 2018-11-01 ASSESSMENT — PAIN SCALES - GENERAL: PAINLEVEL: NO PAIN (0)

## 2018-11-01 NOTE — LETTER
"2018       RE: Loreta Roth  [address] [facility] [address]     Dear Colleague,    Thank you for referring your patient, Loreta Roth, to the [facility] at [facility]. Please see a copy of my visit note below.    S Clinic Note    S: Ms. 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 She is now undergoing q6mo surveillance with vulvoscopy, last was 18.  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 She was instructed to return in 6mo for f/u.  She reports same partner, Nilton, and continues to intermittently have pain at her introitus with intercourse. Thinks it could be a lubrication problem, has tried one lubricating gel that was burning and hasn't found a good one. Also notes a lot of itching still, does help when she takes miralax and pays attention to anal leaking issue discussed with colorectal.  She reports taking Plan B recently with subsequent irregular bleeding. Does use condoms regularly but broke this time and rarely they are \"irresponsible\" and don't use one. 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    O:  /73  Pulse 72  Wt 60.6 kg (133 lb 8 oz)  LMP 10/15/2018  Breastfeeding? No  BMI 22.22 kg/m2     Vulvoscopy Procedure  Verified patient name, , procedure.     Discussed risks (discomfort, bleeding, pain, infection), benefits and alternatives. Pt agreed to proceed. Written informed consent signed.     Procedure: Acetic acid was applied to the vulva with soaked pad and allowed to sit for 3min. The patient was positioned in the dorsal lithotomy position and pad removed, vulva inspected carefully for " acetowhite change. Papules of acetowhite change at posterior fourchette, particularly surrounding hair follicles that appear mildly inflammed. There is a flatter lesions of acetowhite change at 5:30 of the introitus. The area was infiltrated with 1% plain lidocaine. This was biopsied (3mm punch) as well as midline posterior fourchette. Biopsies sent to pathology. Bleeding controlled with silver nitrate.     A/P: Ms. 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 Last HPV vaccination given s/p dx of VIN3.  The patient tolerated the procedure well. I will personally call her with results and recommendations for follow up.  Discussed ACOG recommendations for q6mo vulvoscopy x 5 years, though this is likely overkill for her given spontaneous resolution of VIN3 and young age without immunocompromise. Discussed if this biopsies from today are negative can space to annual vulvoscopy with annual exam, and she would like to think about out, leaning towards coming every 6mo though given anxiety and pain at introitus with intercourse would rather be reassured which is completely reasonable.    Discussed that pap smears were normal for 2 years in a row surrounding this diagnosis and thus recommend return to routine screening, I.e. q3yrs with the next due 1/2021.    Rx for Plan B given with 1 refill. Discussed copper IUD again briefly.    Pt seen and d/w Dr Rebolledo.    Jia Salinas MD PGY3  11/1/18

## 2018-11-01 NOTE — MR AVS SNAPSHOT
After Visit Summary   11/1/2018    Loreta Roth    MRN: [id number]           Patient Information     Date Of Birth          1995        Visit Information        Provider Department      11/1/2018 1:30 PM Jia Salinas MD [facility]        Today's Diagnoses     Vulvar intraepithelial neoplasia (MAURA) grade 3    -  1    Pre-procedure lab exam        Encounter for prescription of emergency contraception          Care Instructions      Vulvoscopy Post-Procedure Patient Instructions      You may experience any of the following for a couple of days following vulvoscopy:    Pain at the site of biopsy    Vaginal bleeding     Vaginal discharge that looks black and clumpy    Please call your healthcare provider if you have any of the following symptoms:    Fever--Temperature greater than 100 degrees    Increasing drainage from biopsy site (if any) after more than 48 hours    Bleeding heavier than a normal period in the first 24-48 hours    If you are bleeding and soaking more than one pad an hour    Or any other worrisome problems.    We recommend that:    You use pads for the bleeding.    You may resume sexual activity in 2-3 days, or after bleeding stops.    You may use Tylenol or ibuprofen (Motrin or Advil) for any discomfort.          Follow-ups after your visit        Follow-up notes from your care team     Return in 6 months (on 5/1/2019), or if symptoms worsen or fail to improve, for annual exam and vulvoscopy.      Who to contact     Please call your clinic at [phone] to:    Ask questions about your health    Make or cancel appointments    Discuss your medicines    Learn about your test results    Speak to your doctor            Additional Information About Your Visit        MyChart Information     [employer] gives you secure access to your electronic health record. If you see a primary care provider, you can also send messages to your care team and make  appointments. If you have questions, please call your primary care clinic.  If you do not have a primary care provider, please call [phone] and they will assist you.      [employer] is an electronic gateway that provides easy, online access to your medical records. With [employer], you can request a clinic appointment, read your test results, renew a prescription or communicate with your care team.     To access your existing account, please contact your [facility] or call [phone] for assistance.        Care EveryWhere ID     This is your Care EveryWhere ID. This could be used by other organizations to access your [place] medical records  [id number]        Your Vitals Were     Pulse Last Period Breastfeeding? BMI (Body Mass Index)          72 10/15/2018 No 22.22 kg/m2         Blood Pressure from Last 3 Encounters:   No data found for BP    Weight from Last 3 Encounters:   No data found for Wt              Today, you had the following     No orders found for display         Today's Medication Changes          These changes are accurate as of 11/1/18 11:59 PM.  If you have any questions, ask your nurse or doctor.               Start taking these medicines.        Dose/Directions    levonorgestrel 1.5 MG Tabs tablet   Commonly known as:  PLAN B   Used for:  Encounter for prescription of emergency contraception   Started by:  Jia Salinas MD        Dose:  1.5 mg   Take 1 tablet (1.5 mg) by mouth once for 1 dose   Quantity:  1 tablet   Refills:  1         Stop taking these medicines if you haven't already. Please contact your care team if you have questions.     omeprazole-sodium bicarbonate  MG per capsule   Commonly known as:  ZEGERID   Stopped by:  Jia Salinas MD                Where to get your medicines      These medications were sent to [id number] - [place], [place] - [address] AT [facility]  [address],  [place] [address]    Hours:  24-hours Phone:  [phone]     levonorgestrel 1.5 MG Tabs tablet                Primary Care Provider Office Phone # Fax #    [facility] MD Marbin [phone]       [address] [place] [address]        Equal Access to Services     ELISHA BARRIGA : Hadii aad ku hadasho Soomaali, waaxda luqadaha, qaybta kaalmada adeegyada, waxay idiin hayaan adeeg kharash la'aan ah. So [facility] [phone].    ATENCIÓN: Si habla español, tiene a spann disposición servicios gratuitos de asistencia lingüística. Peewee al [phone].    We comply with applicable federal civil rights laws and [place] laws. We do not discriminate on the basis of race, color, national origin, age, disability, sex, sexual orientation, or gender identity.            Thank you!     Thank you for choosing [facility]  for your care. Our goal is always to provide you with excellent care. Hearing back from our patients is one way we can continue to improve our services. Please take a few minutes to complete the written survey that you may receive in the mail after your visit with us. Thank you!             Your Updated Medication List - Protect others around you: Learn how to safely use, store and throw away your medicines at www.disposemymeds.org.          This list is accurate as of 11/1/18 11:59 PM.  Always use your most recent med list.                   Brand Name Dispense Instructions for use Diagnosis    DAILY MULTIVITAMIN PO      Take by mouth daily        levonorgestrel 1.5 MG Tabs tablet    PLAN B    1 tablet    Take 1 tablet (1.5 mg) by mouth once for 1 dose    Encounter for prescription of emergency contraception       polyethylene glycol Packet    MIRALAX/GLYCOLAX     Take 1 packet by mouth daily        VYVANSE 40 MG capsule   Generic drug:  lisdexamfetamine      40 mg every morning

## 2018-11-05 LAB — COPATH REPORT: NORMAL

## 2018-11-14 ENCOUNTER — PRE VISIT (OUTPATIENT)
Dept: OBGYN | Facility: CLINIC | Age: 23
End: 2018-11-14

## 2018-11-14 DIAGNOSIS — N90.0 VIN I (VULVAR INTRAEPITHELIAL NEOPLASIA I): Primary | ICD-10-CM

## 2018-11-15 ENCOUNTER — TELEPHONE (OUTPATIENT)
Dept: OBGYN | Facility: CLINIC | Age: 23
End: 2018-11-15

## 2018-11-28 ENCOUNTER — TELEPHONE (OUTPATIENT)
Dept: OBGYN | Facility: CLINIC | Age: 23
End: 2018-11-28

## 2018-12-07 ENCOUNTER — TELEPHONE (OUTPATIENT)
Dept: OBGYN | Facility: CLINIC | Age: 23
End: 2018-12-07

## 2018-12-12 ENCOUNTER — TELEPHONE (OUTPATIENT)
Dept: SURGERY | Facility: CLINIC | Age: 23
End: 2018-12-12

## 2018-12-12 NOTE — TELEPHONE ENCOUNTER
12/12/18 Called patient to schedule follow-up (anal microscopy) clinic appt w/ Radha Chakraborty (Colorectal).  UNABLE TO LEAVE A MESSAGE as mail box is full.  I will send her a letter as GYN has been trying for sometime also.  Marina [phone]

## 2018-12-19 ENCOUNTER — TRANSFERRED RECORDS (OUTPATIENT)
Dept: HEALTH INFORMATION MANAGEMENT | Facility: CLINIC | Age: 23
End: 2018-12-19

## 2019-03-21 ENCOUNTER — TELEPHONE (OUTPATIENT)
Dept: OBGYN | Facility: CLINIC | Age: 24
End: 2019-03-21

## 2019-03-21 DIAGNOSIS — B37.31 CANDIDIASIS OF VULVA AND VAGINA: Primary | ICD-10-CM

## 2019-03-21 RX ORDER — FLUCONAZOLE 150 MG/1
150 TABLET ORAL ONCE
Qty: 1 TABLET | Refills: 1 | Status: SHIPPED | OUTPATIENT
Start: 2019-03-21 | End: 2019-03-21

## 2019-03-21 NOTE — TELEPHONE ENCOUNTER
Pt called to request treatment for yeast. Is experiencing itching and increase in white discharge. Has taken fluconazole in the past with good redults.    Ordered to pt preferred pharmacy. Advised to take refill dose if still symptomatic in 3 days. If this does not resolve symptoms will need to come to clinic    Patient also inquiring if Plan B effective 3 days after unprotected intercourse. Advised this is on the outside of the time limit but still likely effective. Advised to take pregnancy test if misses period next cycle.    Patient expressed understanding and agrees with plan

## 2019-11-04 ENCOUNTER — OFFICE VISIT (OUTPATIENT)
Dept: OBGYN | Facility: CLINIC | Age: 24
End: 2019-11-04
Payer: COMMERCIAL

## 2019-11-04 ENCOUNTER — DOCUMENTATION ONLY (OUTPATIENT)
Dept: OBGYN | Facility: CLINIC | Age: 24
End: 2019-11-04

## 2019-11-04 DIAGNOSIS — N90.3 VULVAR INTRAEPITHELIAL NEOPLASIA: ICD-10-CM

## 2019-11-04 NOTE — LETTER
11/4/2019       RE: Loreta Roth  [address] [facility] [address]     Dear Colleague,    Thank you for referring your patient, Loreta Roth, to the [facility] at [facility]. Please see a copy of my visit note below.    Loreta is a 24-year-old with a history of MAURA 3 in 2017 that resolved spontaneously status post biopsy and underwent CO2 laser of the posterior fourchette in May of 2017.  The plan was to undergo every 6-month surveillance with Vulvoscopy.  This was performed most recently in November of 2018 with a biopsy.  The result was MAURA 1.  At that time CO2 laser was recommended however the patient never scheduled the procedure.    Today she presents to the office requesting a Pap smear and a visual inspection of the external genitalia.    I did explain to the patient that her most recent Pap was January 2, 2018 and was normal and therefore she may go to routine screening as was recommended previously.    Unfortunately the colposcopic procedure room was not reserved today.  She was told that this would be necessary and much more helpful than a gross visual inspection.  She states that she lives just a couple blocks away and is happy to come back.  She does have some concerns about some pain with the biopsies previously.    Adrianna Bates MD

## 2019-11-04 NOTE — PROGRESS NOTES
Loreta is a 24-year-old with a history of MAURA 3 in 2017 that resolved spontaneously status post biopsy and underwent CO2 laser of the posterior fourchette in May of 2017.  The plan was to undergo every 6-month surveillance with Vulvoscopy.  This was performed most recently in November of 2018 with a biopsy.  The result was MAURA 1.  At that time CO2 laser was recommended however the patient never scheduled the procedure.    Today she presents to the office requesting a Pap smear and a visual inspection of the external genitalia.    I did explain to the patient that her most recent Pap was January 2, 2018 and was normal and therefore she may go to routine screening as was recommended previously.    Unfortunately the colposcopic procedure room was not reserved today.  She was told that this would be necessary and much more helpful than a gross visual inspection.  She states that she lives just a couple blocks away and is happy to come back.  She does have some concerns about some pain with the biopsies previously.

## 2019-12-16 ENCOUNTER — OFFICE VISIT (OUTPATIENT)
Dept: OBGYN | Facility: CLINIC | Age: 24
End: 2019-12-16
Attending: OBSTETRICS & GYNECOLOGY
Payer: COMMERCIAL

## 2019-12-16 VITALS
WEIGHT: 145 LBS | SYSTOLIC BLOOD PRESSURE: 128 MMHG | DIASTOLIC BLOOD PRESSURE: 76 MMHG | HEART RATE: 71 BPM | BODY MASS INDEX: 24.13 KG/M2

## 2019-12-16 DIAGNOSIS — D07.1 VIN III (VULVAR INTRAEPITHELIAL NEOPLASIA III): Primary | ICD-10-CM

## 2019-12-16 PROCEDURE — G0463 HOSPITAL OUTPT CLINIC VISIT: HCPCS | Mod: 25,ZF

## 2019-12-16 PROCEDURE — 56820 COLPOSCOPY VULVA: CPT | Mod: ZF | Performed by: OBSTETRICS & GYNECOLOGY

## 2019-12-16 ASSESSMENT — PAIN SCALES - GENERAL: PAINLEVEL: NO PAIN (0)

## 2019-12-16 NOTE — LETTER
12/16/2019    RE: Loreta Roth  [address] [facility] [address]     Dear Colleague,    Thank you for referring your patient, Loreta Roth, to the [facility] at [facility]. Please see a copy of my visit note below.    COLPOSCOPY PROCEDURE NOTE    24 year old  female presents for external genitalia colposcopy.    Loreta is a 24-year-old with a history of MAURA 3 in 2017 that resolved spontaneously status post biopsy and underwent CO2 laser of the posterior fourchette in May of 2017.  The plan was to undergo every 6-month surveillance with Vulvoscopy.  This was performed most recently in November of 2018 with a biopsy.  The result was MAURA 1.  At that time CO2 laser was recommended however the patient never scheduled the procedure. She sometimes gets some itching around the area where she will have an abnormality. She states the biopsies have been extremely painful and she is very nervous about this.  She has a new b.f. of 9 months and he reports that a prior partner had HPV.    Procedure for colposcopy and biopsy has been explained to the patient.  Consent:  Written consent signed and scanned into medical record.    PROCEDURE:  Vulva soaked with acetic acid solution.    FINDINGS:  Area of ?loss of pigment from prior laser vs acetowhite at 6:00 posterior forchette/introitus and to right of this lesion.    ASSESSMENT:possible abnormality vs post op laser.  I have recommended biopsy.    PLAN:   Pt declines biopsy.  She will RTO in 6 months for another colposcopy.    Adrianna Bates MD

## 2019-12-16 NOTE — PROGRESS NOTES
COLPOSCOPY PROCEDURE NOTE    24 year old  female presents for external genitalia colposcopy.    Loreta is a 24-year-old with a history of MAURA 3 in 2017 that resolved spontaneously status post biopsy and underwent CO2 laser of the posterior fourchette in May of 2017.  The plan was to undergo every 6-month surveillance with Vulvoscopy.  This was performed most recently in November of 2018 with a biopsy.  The result was MAURA 1.  At that time CO2 laser was recommended however the patient never scheduled the procedure. She sometimes gets some itching around the area where she will have an abnormality. She states the biopsies have been extremely painful and she is very nervous about this.  She has a new b.f. of 9 months and he reports that a prior partner had HPV.        Procedure for colposcopy and biopsy has been explained to the patient.  Consent:  Written consent signed and scanned into medical record.    PROCEDURE:  Vulva soaked with acetic acid solution.    FINDINGS:  Area of ?loss of pigment from prior laser vs acetowhite at 6:00 posterior forchette/introitus and to right of this lesion.    ASSESSMENT:possible abnormality vs post op laser.  I have recommended biopsy.    PLAN:   Pt declines biopsy.  She will RTO in 6 months for another colposcopy.

## 2020-03-05 PROBLEM — D07.1 VULVAR INTRAEPITHELIAL NEOPLASIA (VIN) GRADE 3: Chronic | Status: ACTIVE | Noted: 2018-01-31

## 2020-03-05 PROBLEM — N90.3 VULVAR INTRAEPITHELIAL NEOPLASIA: Status: RESOLVED | Noted: 2019-11-04 | Resolved: 2020-03-05

## 2020-03-05 PROBLEM — J38.4 VOCAL FOLD EDEMA: Status: RESOLVED | Noted: 2017-09-27 | Resolved: 2020-03-05

## 2020-03-06 ENCOUNTER — OFFICE VISIT (OUTPATIENT)
Dept: FAMILY MEDICINE | Facility: CLINIC | Age: 25
End: 2020-03-06
Payer: COMMERCIAL

## 2020-03-06 VITALS
WEIGHT: 146.25 LBS | TEMPERATURE: 98.3 F | HEIGHT: 66 IN | DIASTOLIC BLOOD PRESSURE: 60 MMHG | SYSTOLIC BLOOD PRESSURE: 109 MMHG | BODY MASS INDEX: 23.5 KG/M2 | RESPIRATION RATE: 14 BRPM | OXYGEN SATURATION: 98 % | HEART RATE: 73 BPM

## 2020-03-06 DIAGNOSIS — F90.0 ATTENTION DEFICIT HYPERACTIVITY DISORDER (ADHD), PREDOMINANTLY INATTENTIVE TYPE: Primary | ICD-10-CM

## 2020-03-06 DIAGNOSIS — Z88.0 HISTORY OF PENICILLIN ALLERGY: ICD-10-CM

## 2020-03-06 DIAGNOSIS — Z82.49 FAMILY HISTORY OF ISCHEMIC HEART DISEASE: ICD-10-CM

## 2020-03-06 PROBLEM — R49.0 DYSPHONIA: Status: RESOLVED | Noted: 2017-09-27 | Resolved: 2020-03-06

## 2020-03-06 PROBLEM — N90.89 LESION OF VULVA: Status: RESOLVED | Noted: 2017-03-31 | Resolved: 2020-03-06

## 2020-03-06 LAB
CHOLEST SERPL-MCNC: 136 MG/DL (ref 0–200)
CHOLEST/HDLC SERPL: 2.5 {RATIO} (ref 0–5)
FASTING SPECIMEN: NO
HBA1C MFR BLD: 5 % (ref 4.1–5.7)
HDLC SERPL-MCNC: 55 MG/DL
LDLC SERPL CALC-MCNC: 68 MG/DL (ref 0–129)
TRIGL SERPL-MCNC: 60 MG/DL (ref 0–150)
VLDL-CHOLESTEROL: 12 (ref 7–32)

## 2020-03-06 SDOH — HEALTH STABILITY: MENTAL HEALTH: HOW OFTEN DO YOU HAVE A DRINK CONTAINING ALCOHOL?: 2-4 TIMES A MONTH

## 2020-03-06 SDOH — HEALTH STABILITY: MENTAL HEALTH: HOW OFTEN DO YOU HAVE 6 OR MORE DRINKS ON ONE OCCASION?: NEVER

## 2020-03-06 SDOH — HEALTH STABILITY: MENTAL HEALTH: HOW MANY STANDARD DRINKS CONTAINING ALCOHOL DO YOU HAVE ON A TYPICAL DAY?: 1 OR 2

## 2020-03-06 ASSESSMENT — MIFFLIN-ST. JEOR: SCORE: 1420.52

## 2020-03-06 NOTE — PROGRESS NOTES
"  SUBJECTIVE:   Loreta Roth is a 24 year old female who presents to clinic today to establish care and to discuss the following problem(s).    # ADHD  - started having issues with school as Freshman in high school but had noticed symptoms since age 12  - \"I couldn't sit down and get my work done\" like her friends  - would always be last person in class to finish tests although she would do well  - noticed she had \"so many thoughts and couldn't get anything down on the page\"  - noticed similar issues at home with chores, \"couldn't stick to one thing\"  - had formal testing with a pediatric psychologist in [place], [place], diagnosed with ADHD, inattentive type  - also screened in for auditory processing disorder though audiologist later thought she didn't have it  - work with a  for much of education up through master's degree  - has been on stimulant therapy since age 14, has been helpful  - initially on ritalin which she didn't tolerate, Adderall for a couple of years but lost weight on it, started on Vyvanse in 2017 which worked well without the drugged feeling of Adderall    - finds that 40mg is a little too much on most days but 30mg wasn't enough for most days  - ability to focus varies day to day  - doesn't take past noon  - tries to take one day a week off of it     - runs regularly which helps with symptoms    - doesn't take Vyvanse on vacations  - sometimes finds it constipating  - has occasional palpitations, typically on days with both Vyvanse and caffeine  - drinks 2-3 cups of coffee a day  - sometimes does make her \"too intense\" or anxious  - sleep was initially an issue with stimulants  - takes a magnesium and an adrenal supplement, gets about 7 hours, goes to bed at midnight and wakes up about 7    Wt Readings from Last 6 Encounters:   03/06/20 66.3 kg (146 lb 4 oz)   12/16/19 65.8 kg (145 lb)   11/04/19 65.5 kg (144 lb 8 oz)   11/01/18 60.6 kg (133 lb 8 oz)   10/16/18 60.3 kg (133 lb)   09/14/18 " 60.7 kg (133 lb 12.8 oz)       # MAURA 3  - regressed after laser therapy  - was recommended to have biopsy after last colpo 19 but chose monitoring instead  - planning for repeat colposcopy 2019    # Family history of heart diesase  - extensive paternal family history of heart disease at young ages  - father with coronary disease found in 30s, paternal uncle  of MI at 43, PGF  of heart attack    ROS: Denies fevers, chills, chest pain, difficulty breathing, abdominal pain    Past Medical History:   Diagnosis Date     ADHD (attention deficit hyperactivity disorder)      Constipation      Epigastric pain 01/10/2018    S/p w/u in [place] (neg US for gallstones, neg EGD for ulcers, improved w/ limited fatty foods)     Hemorrhoids      MAURA III (vulvar intraepithelial neoplasia III)     Spontaneous resolution, CO2 laser 17. Vulvoscopy neg 18. [] Vulvoscopy ~2018      Past Surgical History:   Procedure Laterality Date     LASER CO2 VULVA N/A 2017    Procedure: LASER CO2 VULVA;  Wide Local CO2 Laser Of Vulva;  Surgeon: Yaquelin Rebolledo MD;  Location: [facility] OR     Family History   Problem Relation Age of Onset     Hypertension Father      Anxiety Disorder Father      Depression Father      Diabetes Father         Type 1     Obesity Father      Cerebrovascular Disease Father 35        think it started earlier in life     Kidney failure Father      Hypertension Paternal Grandmother      Depression Paternal Grandmother      Hypertension Paternal Grandfather      Mental Illness Paternal Grandfather         schizophrenia     Obesity Paternal Grandfather      Heart Disease Paternal Grandfather      Mental Illness Paternal Half-Sister      Depression Paternal Half-Sister      Obesity Paternal Half-Sister      Mental Illness Other         paranoid schizophrenia     Mental Illness Maternal Grandmother      Substance Abuse Maternal Grandfather         Alcohol     Depression Maternal Grandfather       "Depression Mother         maybe bipolar disorder?     Diabetes Other         Type 1     Obesity Cousin      Heart Disease Paternal Uncle 43     Breast Cancer Paternal Aunt 60     Ovarian Cancer No family hx of      Colon Cancer No family hx of      Social History     Tobacco Use     Smoking status: Never Smoker     Smokeless tobacco: Never Used   Substance Use Topics     Alcohol use: Yes     Alcohol/week: 1.0 standard drinks     Types: 1 Standard drinks or equivalent per week     Frequency: 2-4 times a month     Drinks per session: 1 or 2     Binge frequency: Never     Comment: Occasional     Drug use: No     Social History     Social History Narrative    [place] [facility] [place]    Full time job is as  but career is in [employer]    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      I have reviewed the patient's past medical, surgical, family, and social history.     OBJECTIVE:   /60 (BP Location: Left arm, Patient Position: Sitting, Cuff Size: Adult Regular)   Pulse 73   Temp 98.3  F (36.8  C) (Oral)   Resp 14   Ht 1.669 m (5' 5.71\")   Wt 66.3 kg (146 lb 4 oz)   LMP 02/22/2020   SpO2 98%   BMI 23.81 kg/m      Constitutional: well-appearing, appears stated age  Eyes: conjunctivae without erythema, sclera anicteric.   Skin: no rashes, lesions, or wounds  Psych: affect is full and appropriate, speech is fluent and non-pressured    Mental Status Exam:  Appearance/Behaviour: appropriate attire, normal eye contact  Abnormal Motor Activity: none  Speech: slightly rapid rate but normal tone and fluency; not pressured  Affect: appropriately reactive.  Thought " Process: linear, goal-directed  Thought Content: no abnormal content  Perceptions: does not appear to be responding to internal stimuli  Cognition: intact  Insight/Judgment: good    ASSESSMENT AND PLAN:     (F90.0) Attention deficit hyperactivity disorder (ADHD), predominantly inattentive type  (primary encounter diagnosis)  Comment: History consistent with ADHD. Symptoms generally well controlled with current stimulant therapy. Wishes she had more flexibility with dosing as some days she feels she only needs 30mg whereas on other days needs 40mg. Unfortunately without switching medications entirely there doesn't seem to be a good way to provide this flexibility outside of giving her a large number of 10mg capsules which her insurance may balk at. For now will plan to continue current therapy. Tolerating the Vyvanse relatively well.     Requesting records from her previous prescriber and going to bring in a copy of her neuropsych testing.     I have queried the [place] Prescription Monitoring Program for this patient to determine if they are an appropriate candidate for a controlled substance prescription. There is no evidence of prescription misuse based on this  search.  Plan:     (Z88.0) History of penicillin allergy  Comment: Send to allergy/immunology to discuss further, possible patch testing.   Plan: ALLERGY/ASTHMA ADULT REFERRAL          (Z82.49) Family history of ischemic heart disease  Comment: Extensive paternal family history of early ischemic heart disease. Loreta is interested in further testing to stratify risk. Checking lipids and a1c today and will refer to preventive cardiology to discuss more advanced testing.   Plan: Lipid Panel ([place]), Hemoglobin A1c         ([facility]), CARDIOLOGY EVAL ADULT REFERRAL          I spent a total of 40 minutes face-to-face with Loreta Roth during today s office visit. Over 50% of this time was spent counseling the patient and/or coordinating care  regarding ADHD.    Ramses Velasquez MD   [facility]  03/06/2020, 9:54 AM

## 2020-03-06 NOTE — NURSING NOTE
"24 year old  Chief Complaint   Patient presents with     [facility]     SHADE     needs a new provider to manage medication       Blood pressure 109/60, pulse 73, temperature 98.3  F (36.8  C), temperature source Oral, resp. rate 14, height 1.669 m (5' 5.71\"), weight 66.3 kg (146 lb 4 oz), last menstrual period 02/22/2020, SpO2 98 %, not currently breastfeeding. Body mass index is 23.81 kg/m .  Patient Active Problem List   Diagnosis     Lesion of vulva @ posterior fourchette     Dysphonia     Vulvar intraepithelial neoplasia (MAURA) grade 3       Wt Readings from Last 2 Encounters:   03/06/20 66.3 kg (146 lb 4 oz)   12/16/19 65.8 kg (145 lb)     BP Readings from Last 3 Encounters:   03/06/20 109/60   12/16/19 128/76   11/04/19 126/82         Current Outpatient Medications   Medication     lisdexamfetamine (VYVANSE) 40 MG capsule     Multiple Vitamins-Minerals (DAILY MULTIVITAMIN PO)     polyethylene glycol (MIRALAX/GLYCOLAX) Packet     No current facility-administered medications for this visit.        Social History     Tobacco Use     Smoking status: Never Smoker     Smokeless tobacco: Never Used   Substance Use Topics     Alcohol use: Yes     Alcohol/week: 1.0 standard drinks     Types: 1 Standard drinks or equivalent per week     Comment: Occasional     Drug use: No       Health Maintenance Due   Topic Date Due     PREVENTIVE CARE VISIT  01/31/2019       Lab Results   Component Value Date    PAP NIL 01/31/2018 March 6, 2020 9:25 AM    "

## 2020-03-09 ENCOUNTER — TRANSFERRED RECORDS (OUTPATIENT)
Dept: HEALTH INFORMATION MANAGEMENT | Facility: CLINIC | Age: 25
End: 2020-03-09

## 2020-03-10 ENCOUNTER — HEALTH MAINTENANCE LETTER (OUTPATIENT)
Age: 25
End: 2020-03-10

## 2020-03-16 PROBLEM — F90.0 ATTENTION DEFICIT HYPERACTIVITY DISORDER (ADHD), PREDOMINANTLY INATTENTIVE TYPE: Chronic | Status: ACTIVE | Noted: 2020-03-06

## 2020-03-18 ENCOUNTER — TELEPHONE (OUTPATIENT)
Dept: FAMILY MEDICINE | Facility: CLINIC | Age: 25
End: 2020-03-18

## 2020-03-18 NOTE — TELEPHONE ENCOUNTER
Prior Authorization Retail Medication Request    Medication/Dose: vyvanse 40 mg  ICD code (if different than what is on RX):  same  Previously Tried and Failed:    Rationale:      Insurance Name:  same  Insurance ID:  same      Pharmacy Information (if different than what is on RX)  Name:  Mike  Phone:  [phone]    Lorenza Lozano RN  [facility]

## 2020-03-19 NOTE — TELEPHONE ENCOUNTER
Central Prior Authorization Team   [phone]    PA Initiation    Medication: vyvanse 40 mg  Insurance Company: [employer] - Phone [phone] Fax [phone]  Pharmacy Filling the Rx: [facility] #[id number] - [place], [place] - [address] AT [facility] [address]  Filling Pharmacy Phone: [phone]  Filling Pharmacy Fax: [phone]  Start Date: 3/19/2020

## 2020-03-23 NOTE — TELEPHONE ENCOUNTER
Prior Authorization Approval    Authorization Effective Date: 2/18/2020  Authorization Expiration Date: 3/19/2021  Medication: vyvanse 40 mg-PA APPROVED   Approved Dose/Quantity:   Reference #:     Insurance Company: [employer] - Phone [phone] Fax [phone]  Expected CoPay:       CoPay Card Available:      Foundation Assistance Needed:    Which Pharmacy is filling the prescription (Not needed for infusion/clinic administered): [facility] #[id number] - [place], [place] - [address] AT [facility] [address]  Pharmacy Notified: Yes- **Instructed pharmacy to notify patient when script is ready to /ship.**  Patient Notified: Yes

## 2020-05-13 ENCOUNTER — MYC REFILL (OUTPATIENT)
Dept: FAMILY MEDICINE | Facility: CLINIC | Age: 25
End: 2020-05-13

## 2020-05-13 DIAGNOSIS — F90.0 ATTENTION DEFICIT HYPERACTIVITY DISORDER (ADHD), PREDOMINANTLY INATTENTIVE TYPE: ICD-10-CM

## 2020-05-14 RX ORDER — LISDEXAMFETAMINE DIMESYLATE 40 MG/1
40 CAPSULE ORAL EVERY MORNING
Qty: 30 CAPSULE | Refills: 0 | Status: SHIPPED | OUTPATIENT
Start: 2020-05-14 | End: 2020-06-19

## 2020-06-10 ENCOUNTER — VIRTUAL VISIT (OUTPATIENT)
Dept: FAMILY MEDICINE | Facility: OTHER | Age: 25
End: 2020-06-10

## 2020-06-10 NOTE — PROGRESS NOTES
"Date: 06/10/2020 12:44:02  Clinician: Nona Weiss  Clinician NPI: [id number]  Patient: Loreta Roth  Patient : 1995  Patient Address: [address] [place] [address]  Patient Phone: [phone]  Visit Protocol: URI  Patient Summary:  Loreta is a 24 year old ( : 1995 ) female who initiated a Visit for COVID-19 (Coronavirus) evaluation and screening. When asked the question \"Please sign me up to receive news, health information and promotions from [facility].\", Loreta responded \"No\".    Loreta states her symptoms started 1-2 days ago.   Her symptoms consist of a sore throat, malaise, anosmia, and nasal congestion.   Symptom details     Nasal secretions: The color of her mucus is yellow and clear.    Sore throat: Loreta reports having mild throat pain (1-3 on a 10 point pain scale), does not have exudate on her tonsils, and can swallow liquids. She is not sure if the lymph nodes in her neck are enlarged. A rash has not appeared on the skin since the sore throat started.      Loreta denies having wheezing, nausea, teeth pain, ageusia, diarrhea, myalgias, facial pain or pressure, fever, cough, vomiting, rhinitis, ear pain, headache, and chills. She also denies having recent facial or sinus surgery in the past 60 days and taking antibiotic medication for the symptoms. She is not experiencing dyspnea.   Precipitating events  Loreta is not sure if she has been exposed to someone with strep throat.   Pertinent COVID-19 (Coronavirus) information  In the past 14 days, Loreta has not worked in a congregate living setting.   She does not work or volunteer as healthcare worker or a  and does not work or volunteer in a healthcare facility.   Loreta also has not lived in a congregate living setting in the past 14 days. She does not live with a healthcare worker.   Loreta has not had a close contact with a laboratory-confirmed COVID-19 patient within 14 days of symptom " onset.   Pertinent medical history  Loreta typically gets a yeast infection when she takes antibiotics. She is not sure if she has used fluconazole (Diflucan) to treat previous yeast infections.   Loreta needs a return to work/school note.   Weight: 145 lbs   Loreta does not smoke or use smokeless tobacco.   She is not sure if she is pregnant and denies breastfeeding. She has menstruated in the past month.   Additional information as reported by the patient (free text): I've lost my voice!! Start Monday night. My voice sounds so hoarse when I talk. I can't work because I need it for work. I usually lose my voice before the start of a respiratory illness. 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 I just started back this last Friday.   Weight: 145 lbs    MEDICATIONS: Vyvanse oral, ALLERGIES: Penicillins  Clinician Response:  Dear Loreta,      Your symptoms show that you may have coronavirus (COVID-19). This illness can cause fever, cough and trouble breathing. Many people get a mild case and get better on their own. Some people can get very sick.  What should I do?  We would like to test you for this virus. This will be a curbside test done outside the clinic.   1. Please call [phone] to schedule your visit. Explain that you were [facility] to have a COVID-19 test. Be ready to share your [facility] visit ID number.  The following will serve as your written order for this COVID Test, ordered by me, for the indication of suspected COVID [Z20.828]: The test will be ordered in [employer], our electronic health record, after you are scheduled. It will show as ordered and authorized by Cedric Schilling MD.  Order: COVID-19 (Coronavirus) PCR for SYMPTOMATIC testing from [facility].      2. When it's time for your COVID test:  Stay at least 6 feet away from others. (If someone will drive you to your test, stay in the backseat, as far away from the  as you can.)   Cover your mouth and nose with a mask, tissue or  "washcloth.  Go straight to the testing site. Don't make any stops on the way there or back.      3.Starting now: Stay home and away from others (self-isolate) until:   You've had no fever---and no medicine that reduces fever---for 3 full days (72 hours). And...   Your other symptoms have gotten better. For example, your cough or breathing has improved. And...   At least 10 days have passed since your symptoms started.       During this time, don't leave the house except for testing or medical care.   Stay in your own room, even for meals. Use your own bathroom if you can.   Stay away from others in your home. No hugging, kissing or shaking hands. No visitors.  Don't go to work, school or anywhere else.    Clean \"high touch\" surfaces often (doorknobs, counters, handles, etc.). Use a household cleaning spray or wipes. You'll find a full list of  on the EPA website: www.epa.gov/pesticide-registration/list-n-disinfectants-use-against-sars-cov-2.   Cover your mouth and nose with a mask, tissue or washcloth to avoid spreading germs.  Wash your hands and face often. Use soap and water.  Caregivers in these groups are at risk for severe illness due to COVID-19:  o People 65 years and older  o People who live in a nursing home or long-term care facility  o People with chronic disease (lung, heart, cancer, diabetes, kidney, liver, immunologic)  o People who have a weakened immune system, including those who:   Are in cancer treatment  Take medicine that weakens the immune system, such as corticosteroids  Had a bone marrow or organ transplant  Have an immune deficiency  Have poorly controlled HIV or AIDS  Are obese (body mass index of 40 or higher)  Smoke regularly   o Caregivers should wear gloves while washing dishes, handling laundry and cleaning bedrooms and bathrooms.  o Use caution when washing and drying laundry: Don't shake dirty laundry, and use the warmest water setting that you can.  o For more tips, go to " www.cdc.gov/coronavirus/2019-ncov/downloads/10Things.pdf.      How can I take care of myself?   Get lots of rest. Drink extra fluids (unless a doctor has told you not to).   Take Tylenol (acetaminophen) for fever or pain. If you have liver or kidney problems, ask your family doctor if it's okay to take Tylenol.   Adults can take either:    650 mg (two 325 mg pills) every 4 to 6 hours, or...   1,000 mg (two 500 mg pills) every 8 hours as needed.    Note: Don't take more than 3,000 mg in one day. Acetaminophen is found in many medicines (both prescribed and over-the-counter medicines). Read all labels to be sure you don't take too much.   For children, check the Tylenol bottle for the right dose. The dose is based on the child's age or weight.    If you have other health problems (like cancer, heart failure, an organ transplant or severe kidney disease): Call your specialty clinic if you don't feel better in the next 2 days.       Know when to call 911. Emergency warning signs include:    Trouble breathing or shortness of breath Pain or pressure in the chest that doesn't go away Feeling confused like you haven't felt before, or not being able to wake up Bluish-colored lips or face.  Where can I get more information?   [facility] -- About COVID-19: [email or web address]   CDC -- What to Do If You're Sick: www.cdc.gov/coronavirus/2019-ncov/about/steps-when-sick.html   CDC -- Ending Home Isolation: www.cdc.gov/coronavirus/2019-ncov/hcp/disposition-in-home-patients.html   CDC -- Caring for Someone: www.cdc.gov/coronavirus/2019-ncov/if-you-are-sick/care-for-someone.html   [facility] -- Interim Guidance for Hospital Discharge to Home: www.health.[facility].[place]/diseases/coronavirus/hcp/hospdischarge.pdf   [facility] clinical trials (COVID-19 research studies): clinicalaffairs.[facility]-clinical-trials    Below are the COVID-19 hotlines at the [place] Department of Health ([facility]). Interpreters are  available.    For health questions: Call 656-985-5268 or 1-225.646.7637 (7 a.m. to 7 p.m.) For questions about schools and childcare: Call 951-060-5341 or 1-613.725.2959 (7 a.m. to 7 p.m.)    Diagnosis: Cough  Diagnosis ICD: R05  Additional Clinician Notes: Dear Loreta, This could possibly be Covid so I want you to self isolate per instructions in this visit summary. I am also referring you for testing so call this number for testing in this visit summary and they will set up the time and place. I hope you feel better soon. Take care

## 2020-06-11 DIAGNOSIS — Z20.822 SUSPECTED COVID-19 VIRUS INFECTION: Primary | ICD-10-CM

## 2020-06-11 PROCEDURE — U0003 INFECTIOUS AGENT DETECTION BY NUCLEIC ACID (DNA OR RNA); SEVERE ACUTE RESPIRATORY SYNDROME CORONAVIRUS 2 (SARS-COV-2) (CORONAVIRUS DISEASE [COVID-19]), AMPLIFIED PROBE TECHNIQUE, MAKING USE OF HIGH THROUGHPUT TECHNOLOGIES AS DESCRIBED BY CMS-2020-01-R: HCPCS | Mod: 90 | Performed by: FAMILY MEDICINE

## 2020-06-11 PROCEDURE — 99207 ZZC NO BILLABLE SERVICE THIS VISIT: CPT

## 2020-06-11 PROCEDURE — 99000 SPECIMEN HANDLING OFFICE-LAB: CPT | Performed by: FAMILY MEDICINE

## 2020-06-11 NOTE — LETTER
June 13, 2020        Loreta Roth  [address] [facility] [address]    This letter provides a written record that you were tested for COVID-19 on 6/11/20.   Your result was negative.    This means that we didn t find the virus that causes COVID-19 in your sample. A test may show negative when you do actually have the virus. This can happen when the virus is in the early stages of infection, before you feel illness symptoms.    Even if you don t have symptoms, they may still appear. For safety, it s very important to follow these rules.    Keep yourself away from others (self-isolation):      Stay home. Don t go to work, school or anywhere else.     Stay in your own room (and use your own bathroom), if you can.    Stay away from others in your home. No hugging, kissing or shaking hands. No visitors.    Clean  high touch  surfaces often (doorknobs, counters, handles, etc.). Use a household cleaning spray or wipes.    Cover your mouth and nose with a mask, tissue or washcloth to avoid spreading germs.    Wash your hands and face often with soap and water.    Stay in self-isolation until you meet ALL of the guidelines below:    1. You have had no fever for at least 72 hours (that is 3 full days of no fever without the use of medicine that reduces fevers), AND  2. other symptoms (such as cough, shortness of breath) have gotten better, AND  3. at least 10 days have passed since your symptoms first appeared.    Going back to work  Check with your employer for any guidelines to follow for going back to work.    Employers: This document serves as formal notice that your employee tested negative for COVID-19, as of the testing date shown above.    For questions regarding this letter or your Negative COVID-19 result, call [phone] between 8A to 6:30P (M-F) and 10A to 6:30P (weekends).

## 2020-06-12 LAB
SARS-COV-2 RNA SPEC QL NAA+PROBE: NOT DETECTED
SPECIMEN SOURCE: NORMAL

## 2020-06-19 ENCOUNTER — MYC REFILL (OUTPATIENT)
Dept: FAMILY MEDICINE | Facility: CLINIC | Age: 25
End: 2020-06-19

## 2020-06-19 DIAGNOSIS — F90.0 ATTENTION DEFICIT HYPERACTIVITY DISORDER (ADHD), PREDOMINANTLY INATTENTIVE TYPE: ICD-10-CM

## 2020-06-19 RX ORDER — LISDEXAMFETAMINE DIMESYLATE 40 MG/1
40 CAPSULE ORAL EVERY MORNING
Qty: 30 CAPSULE | Refills: 0 | Status: SHIPPED | OUTPATIENT
Start: 2020-06-19 | End: 2020-08-03

## 2020-08-03 ENCOUNTER — MYC REFILL (OUTPATIENT)
Dept: FAMILY MEDICINE | Facility: CLINIC | Age: 25
End: 2020-08-03

## 2020-08-03 DIAGNOSIS — F90.0 ATTENTION DEFICIT HYPERACTIVITY DISORDER (ADHD), PREDOMINANTLY INATTENTIVE TYPE: ICD-10-CM

## 2020-08-03 RX ORDER — LISDEXAMFETAMINE DIMESYLATE 40 MG/1
40 CAPSULE ORAL EVERY MORNING
Qty: 30 CAPSULE | Refills: 0 | Status: SHIPPED | OUTPATIENT
Start: 2020-08-03 | End: 2020-10-18

## 2020-09-24 ENCOUNTER — OFFICE VISIT (OUTPATIENT)
Dept: OBGYN | Facility: CLINIC | Age: 25
End: 2020-09-24
Payer: COMMERCIAL

## 2020-09-24 VITALS
DIASTOLIC BLOOD PRESSURE: 80 MMHG | SYSTOLIC BLOOD PRESSURE: 121 MMHG | HEART RATE: 82 BPM | WEIGHT: 148.5 LBS | BODY MASS INDEX: 23.87 KG/M2 | HEIGHT: 66 IN

## 2020-09-24 DIAGNOSIS — N90.0 VIN I (VULVAR INTRAEPITHELIAL NEOPLASIA I): Primary | ICD-10-CM

## 2020-09-24 PROCEDURE — 56821 COLPOSCOPY VULVA W/BIOPSY: CPT

## 2020-09-24 PROCEDURE — G0463 HOSPITAL OUTPT CLINIC VISIT: HCPCS

## 2020-09-24 RX ORDER — IMIQUIMOD 12.5 MG/.25G
CREAM TOPICAL
Qty: 8 PACKET | Refills: 3 | Status: SHIPPED | OUTPATIENT
Start: 2020-09-24 | End: 2021-06-10

## 2020-09-24 ASSESSMENT — PAIN SCALES - GENERAL: PAINLEVEL: NO PAIN (0)

## 2020-09-24 ASSESSMENT — MIFFLIN-ST. JEOR: SCORE: 1430.74

## 2020-09-24 NOTE — PROGRESS NOTES
See vulvoscopy procedure note    Rhea Mckay MD MSc  OBGYN Resident, PGY2    The Patient was seen in Resident Continuity Clinic by    [facility].  I was present for and supervised the entire procedure.       Yaquelin Rebolledo MD

## 2020-09-24 NOTE — PROCEDURES
"[facility]  Procedure Visit    Loreta Encarnacion is a 24 year old  with a hx MAURA 3 who is here for repeat vulvoscopy. She was found to have MAURA 3 in  that resolved spontaneously status post biopsy and underwent CO2 laser of the posterior fourchette in May of 2017. She was last seen in 2019 for vulvoscopy. At that time na area of aceto white change was noted at 6 o'clock (posterior fourchette) and she declined biopsy in favor of surveillance.     She reports new \"bumps\" on her R buttock over the past few weeks. Notes that her  has had similar bumps on his thighs before. She is worried about warts. The bumps are not painful or itchy.     Past Medical History:   Diagnosis Date     ADHD (attention deficit hyperactivity disorder)      Constipation      Epigastric pain 01/10/2018    S/p w/u in [place] (neg US for gallstones, neg EGD for ulcers, improved w/ limited fatty foods)     Hemorrhoids      MAURA III (vulvar intraepithelial neoplasia III)     Spontaneous resolution, CO2 laser 17. Vulvoscopy neg 18. [] Vulvoscopy ~2018      Family History   Problem Relation Age of Onset     Hypertension Father      Anxiety Disorder Father      Depression Father      Diabetes Father         Type 1     Obesity Father      Cerebrovascular Disease Father 35        think it started earlier in life     Kidney failure Father      Hypertension Paternal Grandmother      Depression Paternal Grandmother      Hypertension Paternal Grandfather      Mental Illness Paternal Grandfather         schizophrenia     Obesity Paternal Grandfather      Heart Disease Paternal Grandfather      Mental Illness Paternal Half-Sister      Depression Paternal Half-Sister      Obesity Paternal Half-Sister      Mental Illness Other         paranoid schizophrenia     Mental Illness Maternal Grandmother      Substance Abuse Maternal Grandfather         Alcohol     Depression Maternal Grandfather      Depression Mother         maybe " bipolar disorder?     Diabetes Other         Type 1     Obesity Cousin      Heart Disease Paternal Uncle 43     Breast Cancer Paternal Aunt 60     Ovarian Cancer No family hx of      Colon Cancer No family hx of        Previous history of abnormal paps?: No  History of cryotherapy (freezing)?: : No  Do you desire testing for any of these diseases? : Yes, desires repeat HIV testing  History of genital warts:  No  Visible warts now?:  maybe  Previously treated? If so, how?:  No     Patient's last menstrual period was 09/10/2020.  Type of contraception: condoms    History   Smoking Status     Never Smoker   Smokeless Tobacco     Never Used     Allergies as of 09/24/2020 - Reviewed 09/24/2020   Allergen Reaction Noted     Lactose GI Disturbance 03/06/2020     Penicillins Hives 07/20/2012        PROCEDURE:  Before the procedure, it was ensured that the patient was educated regarding the nature of her findings to date, the implications of them, and what was to be done. The details of the colposcopic procedure were reviewed, as well as the risks of missed diagnoses, pain, infection and bleeding. All questions were answered before proceeding, and informed consent was therefore obtained.    A ratec sponge soaked in acetic acid was placed on the vulva and allowed to sit for 2 minutes. The buttock lesions were then examined. There were ~10 very small round papules on the R buttock just lateral to the anus, toward the perineum. Central umbilication is seen in some of the larger ones. Consistent with molluscum contagiosum. Attention was then turned to the vulva. On vulvoscopic examination there was aceto-white change of the medial aspect of the left labia minora/introitus to the posterior fourchette (3-6 o'clock). Patient declined biopsy.     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 Rebolledo was present for procedure.     Rhea Little MD MSc  OBGYN Resident, PGY2

## 2020-10-18 ENCOUNTER — MYC REFILL (OUTPATIENT)
Dept: FAMILY MEDICINE | Facility: CLINIC | Age: 25
End: 2020-10-18

## 2020-10-18 DIAGNOSIS — F90.0 ATTENTION DEFICIT HYPERACTIVITY DISORDER (ADHD), PREDOMINANTLY INATTENTIVE TYPE: ICD-10-CM

## 2020-10-19 RX ORDER — LISDEXAMFETAMINE DIMESYLATE 40 MG/1
40 CAPSULE ORAL EVERY MORNING
Qty: 90 CAPSULE | Refills: 0 | Status: SHIPPED | OUTPATIENT
Start: 2020-10-19 | End: 2021-05-27

## 2020-12-27 ENCOUNTER — HEALTH MAINTENANCE LETTER (OUTPATIENT)
Age: 25
End: 2020-12-27

## 2021-02-02 ENCOUNTER — MYC MEDICAL ADVICE (OUTPATIENT)
Dept: SURGERY | Facility: CLINIC | Age: 26
End: 2021-02-02

## 2021-02-19 ENCOUNTER — OFFICE VISIT (OUTPATIENT)
Dept: OBGYN | Facility: CLINIC | Age: 26
End: 2021-02-19
Payer: COMMERCIAL

## 2021-02-19 VITALS
BODY MASS INDEX: 24.46 KG/M2 | HEART RATE: 87 BPM | SYSTOLIC BLOOD PRESSURE: 117 MMHG | HEIGHT: 65 IN | WEIGHT: 146.8 LBS | DIASTOLIC BLOOD PRESSURE: 77 MMHG

## 2021-02-19 DIAGNOSIS — Z01.812 PRE-PROCEDURE LAB EXAM: Primary | ICD-10-CM

## 2021-02-19 DIAGNOSIS — Z12.4 CERVICAL CANCER SCREENING: ICD-10-CM

## 2021-02-19 DIAGNOSIS — N90.0 VIN I (VULVAR INTRAEPITHELIAL NEOPLASIA I): ICD-10-CM

## 2021-02-19 DIAGNOSIS — B08.1 MOLLUSCUM CONTAGIOSUM: ICD-10-CM

## 2021-02-19 LAB
HCG UR QL: NEGATIVE
INTERNAL QC OK POCT: YES

## 2021-02-19 PROCEDURE — G0124 SCREEN C/V THIN LAYER BY MD: HCPCS | Mod: 26 | Performed by: PATHOLOGY

## 2021-02-19 PROCEDURE — 81025 URINE PREGNANCY TEST: CPT | Performed by: STUDENT IN AN ORGANIZED HEALTH CARE EDUCATION/TRAINING PROGRAM

## 2021-02-19 PROCEDURE — G0463 HOSPITAL OUTPT CLINIC VISIT: HCPCS

## 2021-02-19 PROCEDURE — G0145 SCR C/V CYTO,THINLAYER,RESCR: HCPCS | Performed by: OBSTETRICS & GYNECOLOGY

## 2021-02-19 PROCEDURE — 99213 OFFICE O/P EST LOW 20 MIN: CPT | Mod: GC | Performed by: OBSTETRICS & GYNECOLOGY

## 2021-02-19 RX ORDER — IMIQUIMOD 12.5 MG/.25G
CREAM TOPICAL
Qty: 12 PACKET | Refills: 3 | Status: SHIPPED | OUTPATIENT
Start: 2021-02-19

## 2021-02-19 ASSESSMENT — MIFFLIN-ST. JEOR: SCORE: 1406.76

## 2021-02-19 NOTE — PROGRESS NOTES
"[facility]  Follow-Up Visit    S: Ms. Loreta Encarnacion is a 25 year old  with hx MAURA 3 here for vulvar surveillance and lesions in per guzman-anal/buttock area. She lesions were present during her visit in 2020 (they had surfaced in late summer). Her  had had similar lesions in his thigh. Last visit they were thought to be due to molluscum contagiosum. She has been using topical imiquimod on the lesions since that time which has made them smaller.     O:  /77 (BP Location: Left arm, Patient Position: Chair)   Pulse 87   Ht 1.651 m (5' 5\")   Wt 66.6 kg (146 lb 12.8 oz)   BMI 24.43 kg/m    Gen: Well-appearing, NAD  HEENT: Normocephalic, atraumatic  CV:        Well perfused; no LE edema  Pulm:  Normal respiratory effort and rate  Abd: Soft, non-tender, non-distended    Pelvic:  Normal external female genitalia architecture.  Normal hair distribution. Multiple 2-3mm small flesh-colored papules on buttocks, near anus and in gluteal cleft (See photo in media tab). On speculum exam vagina is without lesions. Normal appearing discharge. Cervix nulliparous, no lesions. Pap performed today     A/P:  Ms. Loreta Encarnacion is a 25 year old  here for concerns regarding worsening of lesions in guzman-anal/buttock area. These lesions are consistent with molluscum rather than ongoing genital warts.     - recommended expectant management for likely molluscum    - refilled topical imiquimod for sparing use on lesions and when she develops itching  - patient planning to continue surveillance q 6 months for hx MAURA with vulvosopy    Staffed with Dr. Yoselin Little MD MSc  OBGYN Resident, PGY2  2021, 5:11 PM    Patient was seen by the resident in Continuity of Care Clinic.  I reviewed the history & was present for the exam.  The patient's assessment and plan were made jointly.    Le Wyatt MD MPH    "

## 2021-02-25 LAB
COPATH REPORT: ABNORMAL
PAP: ABNORMAL

## 2021-03-01 ENCOUNTER — TELEPHONE (OUTPATIENT)
Dept: OBGYN | Facility: CLINIC | Age: 26
End: 2021-03-01

## 2021-03-06 ENCOUNTER — HEALTH MAINTENANCE LETTER (OUTPATIENT)
Age: 26
End: 2021-03-06

## 2021-04-24 ENCOUNTER — HEALTH MAINTENANCE LETTER (OUTPATIENT)
Age: 26
End: 2021-04-24

## 2021-05-27 ENCOUNTER — MYC REFILL (OUTPATIENT)
Dept: FAMILY MEDICINE | Facility: CLINIC | Age: 26
End: 2021-05-27

## 2021-05-27 DIAGNOSIS — F90.0 ATTENTION DEFICIT HYPERACTIVITY DISORDER (ADHD), PREDOMINANTLY INATTENTIVE TYPE: ICD-10-CM

## 2021-05-27 RX ORDER — LISDEXAMFETAMINE DIMESYLATE 40 MG/1
40 CAPSULE ORAL EVERY MORNING
Qty: 90 CAPSULE | Refills: 0 | Status: SHIPPED | OUTPATIENT
Start: 2021-05-27 | End: 2021-06-25

## 2021-05-27 NOTE — CONFIDENTIAL NOTE
Last visit 3/6/20    Last fill was 10/27/20 for #90    I have queried the [place] Prescription Monitoring Program for this patient to determine if they are an appropriate candidate for a controlled substance prescription. There is no evidence of prescription misuse based on this  search.    Approved refill.  Needs office visit for more.    Ramses Velasquez MD on 5/27/2021 at 12:16 PM

## 2021-06-10 ENCOUNTER — OFFICE VISIT (OUTPATIENT)
Dept: OBGYN | Facility: CLINIC | Age: 26
End: 2021-06-10
Payer: COMMERCIAL

## 2021-06-10 VITALS
SYSTOLIC BLOOD PRESSURE: 128 MMHG | DIASTOLIC BLOOD PRESSURE: 82 MMHG | WEIGHT: 148 LBS | HEART RATE: 86 BPM | BODY MASS INDEX: 24.63 KG/M2

## 2021-06-10 DIAGNOSIS — R87.612 PAPANICOLAOU SMEAR OF CERVIX WITH LOW GRADE SQUAMOUS INTRAEPITHELIAL LESION (LGSIL): Primary | ICD-10-CM

## 2021-06-10 DIAGNOSIS — Z01.812 PRE-PROCEDURE LAB EXAM: ICD-10-CM

## 2021-06-10 LAB
HCG UR QL: NEGATIVE
INTERNAL QC OK POCT: YES

## 2021-06-10 PROCEDURE — 88305 TISSUE EXAM BY PATHOLOGIST: CPT | Mod: 26 | Performed by: PATHOLOGY

## 2021-06-10 PROCEDURE — 81025 URINE PREGNANCY TEST: CPT | Performed by: STUDENT IN AN ORGANIZED HEALTH CARE EDUCATION/TRAINING PROGRAM

## 2021-06-10 PROCEDURE — G0463 HOSPITAL OUTPT CLINIC VISIT: HCPCS

## 2021-06-10 PROCEDURE — 88305 TISSUE EXAM BY PATHOLOGIST: CPT | Mod: TC | Performed by: STUDENT IN AN ORGANIZED HEALTH CARE EDUCATION/TRAINING PROGRAM

## 2021-06-10 ASSESSMENT — PAIN SCALES - GENERAL: PAINLEVEL: NO PAIN (0)

## 2021-06-10 NOTE — LETTER
6/10/2021       RE: Loreta Encarnacion  [address]  [facility] [address]     Dear Colleague,    Thank you for referring your patient, Loreta Encarnacion, to the [facility] [place] [facility]. Please see a copy of my visit note below.    [facility] Procedure Note    Loreta Encarnacion is a 25 year old  here for colposcopy in the setting of LSIL pap 3 months ago and repeat vulvoscopy in the setting of hx MAURA 3 s/p laser ablation of posterior fourchette (undergoing q6 month surveillance vulvoscopies). Her prior 2 paps have been normal.     Past Medical History:   Diagnosis Date     ADHD (attention deficit hyperactivity disorder)      Constipation      Epigastric pain 01/10/2018    S/p w/u in [place] (neg US for gallstones, neg EGD for ulcers, improved w/ limited fatty foods)     Hemorrhoids      MAURA III (vulvar intraepithelial neoplasia III)     Spontaneous resolution, CO2 laser 17. Vulvoscopy neg 18. [] Vulvoscopy ~2018      Family History   Problem Relation Age of Onset     Hypertension Father      Anxiety Disorder Father      Depression Father      Diabetes Father         Type 1     Obesity Father      Cerebrovascular Disease Father 35        think it started earlier in life     Kidney failure Father      Hypertension Paternal Grandmother      Depression Paternal Grandmother      Hypertension Paternal Grandfather      Mental Illness Paternal Grandfather         schizophrenia     Obesity Paternal Grandfather      Heart Disease Paternal Grandfather      Mental Illness Paternal Half-Sister      Depression Paternal Half-Sister      Obesity Paternal Half-Sister      Mental Illness Other         paranoid schizophrenia     Mental Illness Maternal Grandmother      Substance Abuse Maternal Grandfather         Alcohol     Depression Maternal Grandfather      Depression Mother         maybe bipolar disorder?      Diabetes Other         Type 1     Obesity Cousin      Heart Disease Paternal Uncle 43     Breast Cancer Paternal Aunt 60     Ovarian Cancer No family hx of      Colon Cancer No family hx of      Previous history of abnormal paps?: No  History of cryotherapy (freezing)?: : No  History of STIs: : Yes, HPV  Do you desire testing for any of these diseases? : No  History of genital warts:  No  Visible warts now?:  No  Previously treated? If so, how?:  No     Patient's last menstrual period was 05/31/2021.  Type of contraception: condoms    History   Smoking Status     Never Smoker   Smokeless Tobacco     Never Used     Allergies as of 06/10/2021 - Reviewed 06/10/2021   Allergen Reaction Noted     Lactose GI Disturbance 03/06/2020     Penicillins Hives 07/20/2012      PROCEDURE:  Before the procedure, it was ensured that the patient was educated regarding the nature of her findings to date, the implications of them, and what was to be done. She has been made aware of the role of HPV, the natural history of infection, ways to minimize her future risk, the effect of HPV on the cervix, and treatment options available should they be indicated. The details of the colposcopic procedure were reviewed, as well as the risks of missed diagnoses, pain, infection and bleeding. All questions were answered before proceeding, and informed consent was therefore obtained.    Bimanual examination: was not done  Unenhanced examination of the cervix was normal without lesions.  Pap smear and endocervical sampling not obtained due to:    not due    SCJ seen?:  yes  Endocervical speculum needed?:  No  ECC done?:  No  Lugol's solution used?:  No  Satisfactory examination?:  yes    Vaginal vault: normal to cursory inspection  Urethra normal?:  yes  Labia normal?:  Yes  Introitus with area of acetowhite change in area of posterior fourchette, where she has previously had laser ablation  Perineum normal?: yes  Rectum normal?:   yes    FINDINGS:  Cervix: acetowhite area from 4:00 to 7:00  Procedure: biopsies taken (not including ECC): 1 biopsy at 5:00    Procedure summary: Patient tolerated procedure well     Assessment: DOMINIK 1    Plan: Specimens labelled and sent to pathology. Will base further treatment on pathology findings., post biopsy instructions given to patient     Patient prefers MyChart for results and plan    Dr. Funez was present for procedure.    Rhea Little MD MSc  OBGYN Resident, PGY2  Jennifer 10, 2021, 4:57 PM    The patient was seen in resident continuity clinic by Dr. Little.  I have reviewed the history.  I was present for the exam and procedure.  The assessment and plan were jointly made.     Tricia Funez MD, FACOG

## 2021-06-10 NOTE — PROGRESS NOTES
[facility] Procedure Note    Loreta Encarnacion is a 25 year old  here for colposcopy in the setting of LSIL pap 3 months ago and repeat vulvoscopy in the setting of hx MAURA 3 s/p laser ablation of posterior fourchette (undergoing q6 month surveillance vulvoscopies). Her prior 2 paps have been normal.     Past Medical History:   Diagnosis Date     ADHD (attention deficit hyperactivity disorder)      Constipation      Epigastric pain 01/10/2018    S/p w/u in [place] (neg US for gallstones, neg EGD for ulcers, improved w/ limited fatty foods)     Hemorrhoids      MAURA III (vulvar intraepithelial neoplasia III)     Spontaneous resolution, CO2 laser 17. Vulvoscopy neg 18. [] Vulvoscopy ~2018      Family History   Problem Relation Age of Onset     Hypertension Father      Anxiety Disorder Father      Depression Father      Diabetes Father         Type 1     Obesity Father      Cerebrovascular Disease Father 35        think it started earlier in life     Kidney failure Father      Hypertension Paternal Grandmother      Depression Paternal Grandmother      Hypertension Paternal Grandfather      Mental Illness Paternal Grandfather         schizophrenia     Obesity Paternal Grandfather      Heart Disease Paternal Grandfather      Mental Illness Paternal Half-Sister      Depression Paternal Half-Sister      Obesity Paternal Half-Sister      Mental Illness Other         paranoid schizophrenia     Mental Illness Maternal Grandmother      Substance Abuse Maternal Grandfather         Alcohol     Depression Maternal Grandfather      Depression Mother         maybe bipolar disorder?     Diabetes Other         Type 1     Obesity Cousin      Heart Disease Paternal Uncle 43     Breast Cancer Paternal Aunt 60     Ovarian Cancer No family hx of      Colon Cancer No family hx of      Previous history of abnormal paps?: No  History of cryotherapy (freezing)?: : No  History of STIs: : Yes, HPV  Do you desire testing for  any of these diseases? : No  History of genital warts:  No  Visible warts now?:  No  Previously treated? If so, how?:  No     Patient's last menstrual period was 05/31/2021.  Type of contraception: condoms    History   Smoking Status     Never Smoker   Smokeless Tobacco     Never Used     Allergies as of 06/10/2021 - Reviewed 06/10/2021   Allergen Reaction Noted     Lactose GI Disturbance 03/06/2020     Penicillins Hives 07/20/2012      PROCEDURE:  Before the procedure, it was ensured that the patient was educated regarding the nature of her findings to date, the implications of them, and what was to be done. She has been made aware of the role of HPV, the natural history of infection, ways to minimize her future risk, the effect of HPV on the cervix, and treatment options available should they be indicated. The details of the colposcopic procedure were reviewed, as well as the risks of missed diagnoses, pain, infection and bleeding. All questions were answered before proceeding, and informed consent was therefore obtained.    Bimanual examination: was not done  Unenhanced examination of the cervix was normal without lesions.  Pap smear and endocervical sampling not obtained due to:    not due    SCJ seen?:  yes  Endocervical speculum needed?:  No  ECC done?:  No  Lugol's solution used?:  No  Satisfactory examination?:  yes    Vaginal vault: normal to cursory inspection  Urethra normal?:  yes  Labia normal?:  Yes  Introitus with area of acetowhite change in area of posterior fourchette, where she has previously had laser ablation  Perineum normal?: yes  Rectum normal?:  yes    FINDINGS:  Cervix: acetowhite area from 4:00 to 7:00  Procedure: biopsies taken (not including ECC): 1 biopsy at 5:00    Procedure summary: Patient tolerated procedure well     Assessment: DOMINIK 1    Plan: Specimens labelled and sent to pathology. Will base further treatment on pathology findings., post biopsy instructions given to patient      Patient prefers MyChart for results and plan    Dr. Funez was present for procedure.    Rhea Little MD MSc  OBGYN Resident, PGY2  Jennifer 10, 2021, 4:57 PM    The patient was seen in resident continuity clinic by Dr. Little.  I have reviewed the history.  I was present for the exam and procedure.  The assessment and plan were jointly made.     Tricia Funez MD, FACOG

## 2021-06-14 LAB — COPATH REPORT: NORMAL

## 2021-06-16 ENCOUNTER — TELEPHONE (OUTPATIENT)
Dept: OBGYN | Facility: CLINIC | Age: 26
End: 2021-06-16

## 2021-06-16 NOTE — TELEPHONE ENCOUNTER
Patient called RN line, states she is having some irregular bleeding. Pt had a cervical biopsy last week and had some spotting after the procedure and then it stopped. Patient had intercourse and is now having some bleeding again/spotting and was worried. Reassured patient it is normal and cervix is a vascular area. Discussed bleeding precautions with patient, she verbalized understanding and states it is just light spotting.

## 2021-06-21 NOTE — PROGRESS NOTES
ASSESSMENT AND PLAN:     (F90.0) Attention deficit hyperactivity disorder (ADHD), predominantly inattentive type  Comment: Relatively well controlled. We discussed afternoon slump and whether we think it's ADHD related or what would be expected for anyone.    Will try adding a low dose IR Adderall in the early afternoon to see if helps with afternoon attention without causing previously bothersome side effects.    We discussed the jaw (and general) muscle tensioning she has noticed when she takes her stimulant. 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 We considered talking with either the ENT's at [facility] or with psychiatry (collaborative care psychiatry). We'll try this new regimen and Loreta will continue with her home exercises and make a decision on referral in the future.     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 We reviewed which [facility] clinics offers which vaccines.            I spent a total of 43 minutes on the day of the visit.   Time spent doing chart review, history and exam, documentation and further activities per the note      Ramses Velasquez MD   [facility]  06/25/2021, 4:42 PM      SUBJECTIVE:   Loreta is a 25 year old female who presents to clinic today for a return visit.    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  - takes 5 days a week, tries to take off 2 days every week    - had aggression and weight loss with Adderall  - Ritalin made her nauseated    Wt Readings from Last 6 Encounters:   06/25/21 67 kg (147 lb 12 oz)   06/10/21 67.1 kg (148 lb)   02/19/21 66.6 kg (146 lb 12.8 oz)   09/24/20 67.4 kg (148 lb 8 oz)   03/06/20 66.3 kg (146 lb 4 oz)   12/16/19 65.8 kg (145 lb)       ROS: Denies fevers, chills, chest pain, difficulty breathing, abdominal pain    Patient Active Problem List   Diagnosis     Vulvar intraepithelial neoplasia (MAURA) grade 3     Family history of ischemic heart disease     Attention deficit hyperactivity disorder (ADHD), predominantly inattentive type     Current Outpatient Medications   Medication     imiquimod (ALDARA) 5 % external cream     lisdexamfetamine (VYVANSE) 40 MG capsule     Multiple Vitamins-Minerals (DAILY MULTIVITAMIN PO)     No current facility-administered medications for this visit.        I have reviewed the patient's relevant past medical history.     OBJECTIVE:   /77 (BP Location: Left arm, Patient Position: Sitting, Cuff Size: Adult Regular)   Pulse 82   Temp 97.1  F (36.2  C) (Skin)   Resp 13   Wt 67 kg (147 lb 12 oz)   LMP 05/31/2021   SpO2 99%   BMI 24.59 kg/m      Constitutional: well-appearing, appears stated age  Eyes: conjunctivae without erythema, sclera anicteric.   Skin: no rashes, lesions, or wounds  Psych: affect is full and appropriate, speech is fluent and non-pressured

## 2021-06-25 ENCOUNTER — OFFICE VISIT (OUTPATIENT)
Dept: FAMILY MEDICINE | Facility: CLINIC | Age: 26
End: 2021-06-25
Payer: COMMERCIAL

## 2021-06-25 VITALS
HEART RATE: 82 BPM | BODY MASS INDEX: 24.59 KG/M2 | WEIGHT: 147.75 LBS | RESPIRATION RATE: 13 BRPM | TEMPERATURE: 97.1 F | DIASTOLIC BLOOD PRESSURE: 77 MMHG | OXYGEN SATURATION: 99 % | SYSTOLIC BLOOD PRESSURE: 118 MMHG

## 2021-06-25 DIAGNOSIS — F90.0 ATTENTION DEFICIT HYPERACTIVITY DISORDER (ADHD), PREDOMINANTLY INATTENTIVE TYPE: Primary | ICD-10-CM

## 2021-06-25 DIAGNOSIS — Z71.85 VACCINE COUNSELING: ICD-10-CM

## 2021-06-25 RX ORDER — LISDEXAMFETAMINE DIMESYLATE 40 MG/1
40 CAPSULE ORAL EVERY MORNING
Qty: 90 CAPSULE | Refills: 0 | Status: SHIPPED | OUTPATIENT
Start: 2021-06-25 | End: 2022-05-16

## 2021-06-25 RX ORDER — DEXTROAMPHETAMINE SACCHARATE, AMPHETAMINE ASPARTATE, DEXTROAMPHETAMINE SULFATE AND AMPHETAMINE SULFATE 1.25; 1.25; 1.25; 1.25 MG/1; MG/1; MG/1; MG/1
5 TABLET ORAL DAILY
Qty: 30 TABLET | Refills: 0 | Status: SHIPPED | OUTPATIENT
Start: 2021-06-25 | End: 2021-10-28

## 2021-06-25 NOTE — NURSING NOTE
"25 year old  Chief Complaint   Patient presents with     A.D.H.D     Afternoon crashes and if increases feels \"coked out\"       Blood pressure 118/77, pulse 103, temperature 97.1  F (36.2  C), temperature source Skin, resp. rate 13, weight 67 kg (147 lb 12 oz), last menstrual period 05/31/2021, SpO2 99 %, not currently breastfeeding. Body mass index is 24.59 kg/m .  Patient Active Problem List   Diagnosis     Vulvar intraepithelial neoplasia (MAURA) grade 3     Family history of ischemic heart disease     Attention deficit hyperactivity disorder (ADHD), predominantly inattentive type       Wt Readings from Last 2 Encounters:   06/25/21 67 kg (147 lb 12 oz)   06/10/21 67.1 kg (148 lb)     BP Readings from Last 3 Encounters:   06/25/21 118/77   06/10/21 128/82   02/19/21 117/77         Current Outpatient Medications   Medication     imiquimod (ALDARA) 5 % external cream     lisdexamfetamine (VYVANSE) 40 MG capsule     Multiple Vitamins-Minerals (DAILY MULTIVITAMIN PO)     polyethylene glycol (MIRALAX/GLYCOLAX) Packet     No current facility-administered medications for this visit.        Social History     Tobacco Use     Smoking status: Never Smoker     Smokeless tobacco: Never Used   Substance Use Topics     Alcohol use: Yes     Alcohol/week: 1.0 standard drinks     Types: 1 Standard drinks or equivalent per week     Frequency: 2-4 times a month     Drinks per session: 1 or 2     Binge frequency: Never     Comment: Occasional     Drug use: No       Health Maintenance Due   Topic Date Due     ADVANCE CARE PLANNING  Never done     COVID-19 Vaccine (1) Never done     HEPATITIS C SCREENING  Never done     Anal Microscopy (high resolution anoscopy)  11/17/2018     PREVENTIVE CARE VISIT  01/31/2019     ANAL PAP SMEAR  02/26/2019     COLPOSCOPY  06/10/2021       Lab Results   Component Value Date    PAP LSIL 02/19/2021 June 25, 2021 4:07 PM    "

## 2021-07-06 ENCOUNTER — IMMUNIZATION (OUTPATIENT)
Dept: NURSING | Facility: CLINIC | Age: 26
End: 2021-07-06
Payer: COMMERCIAL

## 2021-07-06 PROCEDURE — 0001A PR COVID VAC PFIZER DIL RECON 30 MCG/0.3 ML IM: CPT

## 2021-07-06 PROCEDURE — 91300 PR COVID VAC PFIZER DIL RECON 30 MCG/0.3 ML IM: CPT

## 2021-09-19 NOTE — PROGRESS NOTES
ASSESSMENT AND PLAN:     (G47.00) Insomnia, unspecified type  (primary encounter diagnosis)  (R06.83) Snoring  Comment: Concerned that pt may have underlying apneic episodes not yet diagnosed. Despite results of Stop-Bang and [place] questionnaire (see HPI), based on clinical and fhx think it would ultimately be prudent for pt to follow up with a sleep specialist to evaluate further and determine whether sleep study would be indicated and how to proceed further. Pt agrees to plan, referral placed.  Plan: SLEEP EVALUATION & MANAGEMENT REFERRAL - ADULT               (F90.0) Attention deficit hyperactivity disorder (ADHD), predominantly inattentive type  Comment & plan: Controlled on amphetamine-dextroamphetamine (Adderall) 5mg and lisdexamfetamine (Vyvanse) 40mg daily. Continue w/treatment regimen.    Nando Cosby, MS3  [employer]    I was present with the medical student who participated in the service and in the documentation of the note. I have verified the history and personally performed the physical exam and medical decision making. I agree with the assessment and plan of care as documented in the note with the following additions:   Even if Loreta doesn't have apnea, I think she would benefit from a more extensive conversation on sleep hygiene and potential referral to sleep psychology, so I agree with sleep referral.   We discussed simple things she could do to improve her sleep quality including eliminating afternoon caffeine, avoiding napping, setting a consistent sleep schedule.    We discussed the role her stimulant treatments could be having on her sleep quality. For now, not changes if we can get improvement with sleep hygiene improvements.     Ramses Velasquez MD  4:50 PM, September 22, 2021      SUBJECTIVE:   Loreta is a 25 year old female who presents to clinic today for a return visit.    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  - Goes to bed around 11pm, gets up around 9am and does not feel well-rested.  - Sometimes trouble falling asleep, especially when on days taking ADHD meds eg racing thoughts, staring at wall at night.   - Might wake up once in night to go to bathroom, will have to lie in bed for a couple hours before falling back to sleep or getting out of bed in the morning.  - If night-time wakening occurs, usually it's between 2:30-4am. Might not fall back asleep until 5 or 6am.  - She is able to sleep in until 10am if needed d/t work schedule.  - Tries to match up with her 's sleep schedule but he wakes up at least a couple hours earlier than her.  - Magnesium helps her fall asleep, melatonin has no effect. CBD oil good, makes her feel more restede in the morning.      - Says she feels \"poky\" in the morning.  - Fatigue for her=hard to get up and get going in the day and stay energetic.  - Unsure if fatigue sx are directly linked to sleep or overlap to some degree with ADHD sx.  - Likes the ADHD medication regimen she is on, but it makes it harder for her to identify fatigue sx.  - Feels fatigue described as \"lethargy\" lasting throughout the day.  - If she doesn't get enough sleep might want to take a nap but usually does not have enough time with work to take naps.    -  says she snores when sleeping  -  reports she sometimes gasps for air when she wakes up, she says d/t dreams. Has been ongoing for about 1.5 years.  - Friends have told her she is \"breathing loud\" and snoring when sleeping.  - Both parents with sleep apnea and have been on sleep machines before.  - Mother has sleep apnea and is NOT overweight  - Mom has sleep machine that self-regulates CPAP during night. Pt tried this machine and " found that she stopped breathing during sleep and the machine increased air flow to compensate.  - Has a hx of deviated septum.  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   - Drinks up to 3 Belgian coffees during the day, probably between 9am and 3:30pm.  - No caffeinated beverages after 3:30pm.  - Occasional alcohol, maybe a beer or glass of wine once a week.  - Not much exercise.    - QOL: becrefugio she works in a rigorous field, she wants to address sleep and hopes that improving sleep will improve her ADHD issues as well.   - Applying for a teaching position right now and concerned that she would have to be on a more standard schedule if she were to be offeredthe position.    # ADHD, inattentive  - at last visit 6/25/21 we added a low dose of IR Adderall 5mg in the early afternoon to her usual Vyvanse 40mg daily  - Wondering if stimulants cuold be affecting her sleep.  - Had been on Adderrall when she was younger but lost a lot of weight on that.  - Has been on Vyvanse since 15. Added Adderrall to stop crash in the afternoon caused by Vyvanse.  - Vyvanse can give her dry mouth. Also hard time falling asleep with it.    STOP BANG Questionnaire   Body mass index is 23.43 kg/m .  Neck circumference: not measured but grossly appears much less than 40cm   1. Snoring   Do you snore loudly (louder than talking or loud enough to be heard   through closed doors)? No   2. Tired   Do you often feel tired, fatigued, or sleepy during daytime? Yes  3. Observed   Has anyone observed you stop breathing during your sleep? Yes  4. Blood pressure   Do you have or are you being treated for high blood pressure? No  5. BMI   BMI more than 35 kg/m2? No   6. Age   Age over 50 yr old? No  7. Neck circumference   Neck circumference greater than 40 cm?   8. Gender   Gender male? No    High risk of LUCINA: answering yes to three or more items   Low risk of LUCINA: answering yes to less than three items     Adapted from  "STOP Questionnaire: A Tool to Screen Patients for Obstructive Sleep Apnea. Edgar Rivera, Vince Jacobs, Vanita Olivares, Best Obregon, Richard Trevino, Christine Robledo, and Kaiden Covington. Anesthesiology 2008; [id number]    [place] Sleepiness Scale  Each situation receives a score of zero to three, which is related to the likelihood that sleep will be induced:    0 = would never doze, 1 = slight chance of dozing, 2 = moderate chance, 3 = high chance  - Sitting and readin  - Watching television: 3  - Sitting inactively in a public place: 0  - Riding as a passenger in a car for one hour without a break: 0  - Lying down to rest in the afternoon when circumstances permit: 3  - Sitting and talking with someone: 0  - Sitting quietly after lunch without alcohol: 1  - Sitting in a car as the , while stopped for a few minutes in traffic: 0     Total Score (>10 indicates excessive sleepiness): 9    Patient Active Problem List   Diagnosis     Vulvar intraepithelial neoplasia (MAURA) grade 3     Family history of ischemic heart disease     Attention deficit hyperactivity disorder (ADHD), predominantly inattentive type     Current Outpatient Medications   Medication     amphetamine-dextroamphetamine (ADDERALL) 5 MG tablet     imiquimod (ALDARA) 5 % external cream     lisdexamfetamine (VYVANSE) 40 MG capsule     Multiple Vitamins-Minerals (DAILY MULTIVITAMIN PO)     No current facility-administered medications for this visit.       I have reviewed the patient's relevant past medical history.     OBJECTIVE:   /71 (BP Location: Left arm, Patient Position: Sitting, Cuff Size: Adult Regular)   Pulse 80   Temp 97.1  F (36.2  C) (Skin)   Resp 15   Ht 1.664 m (5' 5.5\")   Wt 64.9 kg (143 lb)   LMP 2021   SpO2 96%   BMI 23.43 kg/m      Mouth: oropharynx appropriate w/pink mucosa, moist, uvular position at midline, palate elevation good, no evidence of foreign body or obstruction, no " evidence of acute irritation or infection, no masses or lesions. Mallampati 1.     Skin: no rashes, lesions, or wounds  Psych: affect is full and appropriate, speech is fluent and non-pressured

## 2021-09-20 ENCOUNTER — OFFICE VISIT (OUTPATIENT)
Dept: FAMILY MEDICINE | Facility: CLINIC | Age: 26
End: 2021-09-20
Payer: COMMERCIAL

## 2021-09-20 VITALS
OXYGEN SATURATION: 96 % | TEMPERATURE: 97.1 F | HEART RATE: 80 BPM | DIASTOLIC BLOOD PRESSURE: 71 MMHG | BODY MASS INDEX: 22.98 KG/M2 | SYSTOLIC BLOOD PRESSURE: 117 MMHG | HEIGHT: 66 IN | RESPIRATION RATE: 15 BRPM | WEIGHT: 143 LBS

## 2021-09-20 DIAGNOSIS — G47.00 INSOMNIA, UNSPECIFIED TYPE: Primary | ICD-10-CM

## 2021-09-20 DIAGNOSIS — R06.83 SNORING: ICD-10-CM

## 2021-09-20 DIAGNOSIS — F90.0 ATTENTION DEFICIT HYPERACTIVITY DISORDER (ADHD), PREDOMINANTLY INATTENTIVE TYPE: ICD-10-CM

## 2021-09-20 ASSESSMENT — MIFFLIN-ST. JEOR: SCORE: 1397.45

## 2021-09-20 NOTE — NURSING NOTE
"25 year old  Chief Complaint   Patient presents with     Snoring     some sleep issue, not feeling rested, discuss possible sleep apnea        Blood pressure 117/71, pulse 80, temperature 97.1  F (36.2  C), temperature source Skin, resp. rate 15, height 1.664 m (5' 5.5\"), weight 64.9 kg (143 lb), last menstrual period 09/19/2021, SpO2 96 %, not currently breastfeeding. Body mass index is 23.43 kg/m .  Patient Active Problem List   Diagnosis     Vulvar intraepithelial neoplasia (MAURA) grade 3     Family history of ischemic heart disease     Attention deficit hyperactivity disorder (ADHD), predominantly inattentive type       Wt Readings from Last 2 Encounters:   09/20/21 64.9 kg (143 lb)   06/25/21 67 kg (147 lb 12 oz)     BP Readings from Last 3 Encounters:   09/20/21 117/71   06/25/21 118/77   06/10/21 128/82         Current Outpatient Medications   Medication     amphetamine-dextroamphetamine (ADDERALL) 5 MG tablet     imiquimod (ALDARA) 5 % external cream     lisdexamfetamine (VYVANSE) 40 MG capsule     Multiple Vitamins-Minerals (DAILY MULTIVITAMIN PO)     No current facility-administered medications for this visit.       Social History     Tobacco Use     Smoking status: Never Smoker     Smokeless tobacco: Never Used   Substance Use Topics     Alcohol use: Yes     Alcohol/week: 1.0 standard drinks     Types: 1 Standard drinks or equivalent per week     Comment: Occasional     Drug use: No       Health Maintenance Due   Topic Date Due     ADVANCE CARE PLANNING  Never done     HEPATITIS C SCREENING  Never done     Anal Microscopy (high resolution anoscopy)  11/17/2018     PREVENTIVE CARE VISIT  01/31/2019     ANAL PAP SMEAR  02/26/2019     INFLUENZA VACCINE (1) 09/01/2021       Lab Results   Component Value Date    PAP LSIL 02/19/2021 September 20, 2021 1:41 PM    "

## 2021-09-20 NOTE — PATIENT INSTRUCTIONS
1) Try to move caffeine up to 1pm at the latest  2) Regular exercise is wonderful for sleep  3) Keep a regular sleep schedule if at all possible, even if it means getting out of bed before you would like  4) Don't lay in bed for more than 30 minutes. If sleep is not coming, get out of bed to somewhere dark      Call and schedule with the sleep medicine clinic, [phone]

## 2021-09-27 ENCOUNTER — TELEPHONE (OUTPATIENT)
Dept: FAMILY MEDICINE | Facility: CLINIC | Age: 26
End: 2021-09-27

## 2021-10-08 ENCOUNTER — MYC MEDICAL ADVICE (OUTPATIENT)
Dept: SURGERY | Facility: CLINIC | Age: 26
End: 2021-10-08

## 2021-10-09 ENCOUNTER — HEALTH MAINTENANCE LETTER (OUTPATIENT)
Age: 26
End: 2021-10-09

## 2021-10-28 ENCOUNTER — MYC REFILL (OUTPATIENT)
Dept: FAMILY MEDICINE | Facility: CLINIC | Age: 26
End: 2021-10-28

## 2021-10-28 DIAGNOSIS — F90.0 ATTENTION DEFICIT HYPERACTIVITY DISORDER (ADHD), PREDOMINANTLY INATTENTIVE TYPE: ICD-10-CM

## 2021-10-29 RX ORDER — DEXTROAMPHETAMINE SACCHARATE, AMPHETAMINE ASPARTATE, DEXTROAMPHETAMINE SULFATE AND AMPHETAMINE SULFATE 1.25; 1.25; 1.25; 1.25 MG/1; MG/1; MG/1; MG/1
5 TABLET ORAL DAILY
Qty: 30 TABLET | Refills: 0 | Status: SHIPPED | OUTPATIENT
Start: 2021-10-29 | End: 2022-05-16

## 2021-10-29 NOTE — CONFIDENTIAL NOTE
Last visit 9/20/21  Last fill 6/25/21 for #30 tablets of Adderall 5mg IR    Approved refill    Ramses Velasquez MD on 10/29/2021 at 9:24 AM

## 2021-11-18 ENCOUNTER — VIRTUAL VISIT (OUTPATIENT)
Dept: SLEEP MEDICINE | Facility: CLINIC | Age: 26
End: 2021-11-18
Attending: FAMILY MEDICINE
Payer: COMMERCIAL

## 2021-11-18 VITALS — WEIGHT: 142 LBS | HEIGHT: 66 IN | BODY MASS INDEX: 22.82 KG/M2

## 2021-11-18 DIAGNOSIS — G47.10 HYPERSOMNIA: Primary | ICD-10-CM

## 2021-11-18 DIAGNOSIS — R06.83 SNORING: ICD-10-CM

## 2021-11-18 DIAGNOSIS — G47.21 SLEEP DISORDER, CIRCADIAN, DELAYED SLEEP PHASE TYPE: ICD-10-CM

## 2021-11-18 DIAGNOSIS — G47.00 INSOMNIA, UNSPECIFIED TYPE: ICD-10-CM

## 2021-11-18 PROCEDURE — 99204 OFFICE O/P NEW MOD 45 MIN: CPT | Mod: 95 | Performed by: INTERNAL MEDICINE

## 2021-11-18 ASSESSMENT — MIFFLIN-ST. JEOR: SCORE: 1387.92

## 2021-11-18 NOTE — PATIENT INSTRUCTIONS
Your BMI is Body mass index is 23.27 kg/m .  Weight management is a personal decision.  If you are interested in exploring weight loss strategies, the following discussion covers the approaches that may be successful. Body mass index (BMI) is one way to tell whether you are at a healthy weight, overweight, or obese. It measures your weight in relation to your height.  A BMI of 18.5 to 24.9 is in the healthy range. A person with a BMI of 25 to 29.9 is considered overweight, and someone with a BMI of 30 or greater is considered obese. More than two-thirds of American adults are considered overweight or obese.  Being overweight or obese increases the risk for further weight gain. Excess weight may lead to heart disease and diabetes.  Creating and following plans for healthy eating and physical activity may help you improve your health.  Weight control is part of healthy lifestyle and includes exercise, emotional health, and healthy eating habits. Careful eating habits lifelong are the mainstay of weight control. Though there are significant health benefits from weight loss, long-term weight loss with diet alone may be very difficult to achieve- studies show long-term success with dietary management in less than 10% of people. Attaining a healthy weight may be especially difficult to achieve in those with severe obesity. In some cases, medications, devices and surgical management might be considered.  What can you do?  If you are overweight or obese and are interested in methods for weight loss, you should discuss this with your provider.     Consider reducing daily calorie intake by 500 calories.     Keep a food journal.     Avoiding skipping meals, consider cutting portions instead.    Diet combined with exercise helps maintain muscle while optimizing fat loss. Strength training is particularly important for building and maintaining muscle mass. Exercise helps reduce stress, increase energy, and improves fitness.  Increasing exercise without diet control, however, may not burn enough calories to loose weight.       Start walking three days a week 10-20 minutes at a time    Work towards walking thirty minutes five days a week     Eventually, increase the speed of your walking for 1-2 minutes at time    In addition, we recommend that you review healthy lifestyles and methods for weight loss available through the National Institutes of Health patient information sites:  http://win.niddk.nih.gov/publications/index.htm    And look into health and wellness programs that may be available through your health insurance provider, employer, local community center, or [employer].

## 2021-11-18 NOTE — PROGRESS NOTES
Loreta Encarnacion is a 26 year old who is being evaluated via a billable video visit.      How would you like to obtain your AVS? MyChart  If the video visit is dropped, the invitation should be resent by: Text to cell phone: [phone]  Will anyone else be joining your video visit? No    Are you currently in the state of [place] Yes  Does patient have any form of state insurance?No   Do you have wifi? Yes  Do you have a smart phone/device?Yes  Can you download an nu on your phone comfortably with out assistance including You Tube? Yes    If patient encounters technical issues they should call [phone] :[id number]}    Patricio Vasquez CMA    Video-Visit Details    Video Start Time: 11:23 AM    Type of service:  Video Visit    Video End Time:11:58 AM    Originating Location (pt. Location): Home    Distant Location (provider location):  @[email or web address]@     Platform used for Video Visit: AmWell    Additional 15 minutes on the date of service was spent performing the following:    -Preparing to see the patient  -Obtaining and/or reviewing separately obtained history   -Ordering medications, tests, or procedures   -Documenting clinical information in the electronic or other health record     Thank you for the opportunity to participate in the care of  Loreta Encarnacion.    Assessment and Plan:    1. Hypersomnia/Snoring  At this point in time I informed the patient that the cause of her symptoms may be multifactorial in nature. Differential diagnosis includes idiopathic hypersomnia, narcolepsy or sleep disordered breathing.  I would like the patient to fill out a 2-week diary with actigraphy before she proceeds with a overnight sleep study with a nap study.  She will need to be off of any stimulant medication for at least 2 weeks prior to the testing.  - Comprehensive Sleep Study; Future  - Drug Confirmation Panel Urine with Creat (collected in Sleep Lab); Future  - PA ACTIGRAPHY TEST 72 HRS - 14 DAYS  - MULTIPLE  SLEEP LATENCY TEST    2. Sleep disorder, circadian, delayed sleep phase type  I suspect that the patient being a night owl may be a contributing factor in her daytime sleepiness.  We will need to address this in more detail after we have ruled out any organic sleep disorder.    4. Insomnia, unspecified type  May need to address in more detail after we have gathered more information from the sleep studies.      History of present illness:    She is a 26 year old female who comes to the [facility] for the transfer of care of her sleep issues.  The patient had 3 sleep studies when she was in [place].  She had 1 home sleep study which was negative.  She subsequently underwent an overnight sleep study and a nap study which showed that she had hypersomnia.  All the studies were done in 2019.  Complicating matters further is the fact that she started having sleep issues when she was a teenager around the age of 15.  During the same time, she was diagnosed with having ADHD and started on stimulant medications.  While she denies any episodes of witnessed apnea she has been told that she does have some snoring during sleep.  She also reports that the Vyvanse and the Adderall does not help her daytime sleepiness.  She also admits that she is kind of a night owl.  Her sleep issues has had an adverse effect on her concentration at work.  She also complains of difficulty initiating and sometimes maintaining sleep.  Upon further questioning she denies any episodes of cataplexy, hypnagogic or hypnopompic hallucination or sleep paralysis.  She denies any tongue biting or soiling the bed during sleep.     Ideal Sleep-Wake Cycle(devoid of societal pressure):    Patient would try to initiate sleep at around Midnight with a sleep latency of 1 hour. The patient would have 1 awakenings. Final wake up time is around 9-10 AM.    Total score - [place]: 6 (11/18/2021 10:38 AM)    Patient told to return in one week after the sleep study  is interpreted.    Patient Active Problem List   Diagnosis     Vulvar intraepithelial neoplasia (MAURA) grade 3     Family history of ischemic heart disease     Attention deficit hyperactivity disorder (ADHD), predominantly inattentive type     Past Medical History:   Diagnosis Date     ADHD (attention deficit hyperactivity disorder)      Constipation      Epigastric pain 01/10/2018    S/p w/u in [place] (neg US for gallstones, neg EGD for ulcers, improved w/ limited fatty foods)     Hemorrhoids      MAURA III (vulvar intraepithelial neoplasia III)     Spontaneous resolution, CO2 laser 5/30/17. Vulvoscopy neg 1/31/18. [] Vulvoscopy ~8/2018      Past Surgical History:   Procedure Laterality Date     LASER CO2 VULVA N/A 5/30/2017    Procedure: LASER CO2 VULVA;  Wide Local CO2 Laser Of Vulva;  Surgeon: Yaquelin Rebolledo MD;  Location: [facility] OR     Current Outpatient Medications   Medication Sig Dispense Refill     amphetamine-dextroamphetamine (ADDERALL) 5 MG tablet Take 1 tablet (5 mg) by mouth daily in the early afternoon. 30 tablet 0     Ascorbic Acid (VITAMIN C PO)        imiquimod (ALDARA) 5 % external cream Apply a small sized amount to warts or molluscum three times weekly at bedtime.   Wash off after 8 hours.   May use for up to 16 weeks. 12 packet 3     lisdexamfetamine (VYVANSE) 40 MG capsule Take 1 capsule (40 mg) by mouth every morning 90 capsule 0     Multiple Vitamins-Minerals (DAILY MULTIVITAMIN PO) Take by mouth daily        Multiple Vitamins-Minerals (ZINC PO)        VITAMIN D PO        Lactose and Penicillins  Social History     Socioeconomic History     Marital status:      Spouse name: Not on file     Number of children: Not on file     Years of education: Not on file     Highest education level: Not on file   Occupational History     Not on file   Tobacco Use     Smoking status: Never Smoker     Smokeless tobacco: Never Used   Substance and Sexual Activity     Alcohol use: Yes     Alcohol/week:  1.0 standard drink     Types: 1 Standard drinks or equivalent per week     Comment: Occasional     Drug use: No     Sexual activity: Yes     Partners: Male     Birth control/protection: Pull-out method, Condom     Comment: Doesn't want hormonal contraception d/t effect on voice (she is a maldonado)   Other Topics Concern     Not on file   Social History Narrative    Previously full time job is as  but this disrupted by COVID - ending 06/2021    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     Hypertension Father      Anxiety Disorder Father      Depression Father      Diabetes Father         Type 1     Obesity Father      Cerebrovascular Disease Father 35        think it started earlier in life     Kidney failure Father      Sleep Apnea Father      Mental Illness Maternal Grandmother      Substance Abuse Maternal Grandfather         Alcohol     Depression Maternal Grandfather      Hypertension Paternal Grandmother      Depression Paternal Grandmother      Hypertension Paternal Grandfather      Mental Illness Paternal Grandfather         schizophrenia     Obesity Paternal Grandfather      Heart Disease Paternal Grandfather      Breast Cancer Paternal Aunt 60     Heart Disease Paternal Uncle 43     Mental Illness Paternal Half-Sister      Depression Paternal Half-Sister      Obesity Paternal Half-Sister      Obesity Cousin      Mental Illness Other         paranoid schizophrenia     Diabetes  Other         Type 1     Ovarian Cancer No family hx of      Colon Cancer No family hx of         Physical Exam:  GEN: NAD,   Head: Normocephalic.  EYES: EOMI  Psych: normal mood, normal affect    Labs/Studies:     No results found for: PH, PHARTERIAL, PO2, [id number], SAT, PCO2, HCO3, BASEEXCESS, FERNANDA, BEB  Lab Results   Component Value Date    TSH 0.76 09/14/2018     No results found for: GLC  Lab Results   Component Value Date    HGB 15.0 10/16/2018     No results found for: BUN, CR  No results found for: AST, ALT, GGT, ALKPHOS, BILITOTAL, BILICONJ, BILIDIRECT, STEFANIE  No results found for: UAMP, UBARB, BENZODIAZEUR, UCANN, UCOC, OPIT, UPCP    No results for input(s): NA, POTASSIUM, CHLORIDE, CO2, ANIONGAP, GLC, BUN, CR, MIRZA in the last 81127 hours.    No results found for: ADELINE Tierney DO  Board Certified in Internal Medicine and Sleep Medicine    (Note created with Dragon voice recognition and unintended spelling errors and word substitutions may occur)

## 2021-12-22 NOTE — NURSING NOTE
Writer sent an email to [facility] and [facility] to call patient and schedule a PSG that will coincide with the Actigraphy and they will send the Sleep diary after scheduling everything. No further actions needed.

## 2021-12-28 ENCOUNTER — TRANSCRIBE ORDERS (OUTPATIENT)
Dept: OTHER | Age: 26
End: 2021-12-28
Payer: COMMERCIAL

## 2021-12-28 DIAGNOSIS — N64.4 BREAST TENDERNESS: Primary | ICD-10-CM

## 2022-01-17 NOTE — PROGRESS NOTES
NEW CONSULTATION   Jan 19, 2022     Loreta Encarnacion is a 26 year old woman who presents with breast pain.     HPI:    Her  notice a left breast area of skin redness in mid December. The pigment changed to a brown color that has persisted but fading. She denies any mass, pain, fever, nipple inversion or nipple discharge. She is not applying anything to the skin.     BREAST-SPECIFIC HISTORY:    Previous breast imaging: No    Prior breast biopsies/surgeries: No    Prior history of breast cancer or DCIS: No  Prior radiation history: No  Self breast exams: Yes  Breast density:     GYN HISTORY:  G0  Age at menarche: 10  Menopausal: premonpausal.   Menopausal hormone replacement therapy: No     RISK ASSESSMENT: < 20% lifetime risk   Sarahi:n/a  CHAZ/Lesvia: 19.2% lifetime risk  Frankie: 8.3% lifetime risk    FAMILY HISTORY:  Breast ca: Yes   - paternal aunt, 40  Ovarian ca: No  Pancreatic ca: No  Prostate: No  Gastric ca: No  Melanoma: No  Colon ca: No  Other cancer: No  Other genetic, testing, syndromes, or clotting disorders: no     PAST MEDICAL HISTORY  Past Medical History:   Diagnosis Date     ADHD (attention deficit hyperactivity disorder)      Constipation      Epigastric pain 01/10/2018    S/p w/u in [place] (neg US for gallstones, neg EGD for ulcers, improved w/ limited fatty foods)     Hemorrhoids      MAURA III (vulvar intraepithelial neoplasia III)     Spontaneous resolution, CO2 laser 5/30/17. Vulvoscopy neg 1/31/18. [] Vulvoscopy ~8/2018      PAST SURGICAL HISTORY   Past Surgical History:   Procedure Laterality Date     LASER CO2 VULVA N/A 5/30/2017    Procedure: LASER CO2 VULVA;  Wide Local CO2 Laser Of Vulva;  Surgeon: Yaquelin Rebolledo MD;  Location:  OR     MEDICATIONS  Current Outpatient Medications   Medication Sig Dispense Refill     amphetamine-dextroamphetamine (ADDERALL) 5 MG tablet Take 1 tablet (5 mg) by mouth daily in the early afternoon. 30 tablet 0     Ascorbic Acid (VITAMIN C  "PO)        imiquimod (ALDARA) 5 % external cream Apply a small sized amount to warts or molluscum three times weekly at bedtime.   Wash off after 8 hours.   May use for up to 16 weeks. 12 packet 3     lisdexamfetamine (VYVANSE) 40 MG capsule Take 1 capsule (40 mg) by mouth every morning 90 capsule 0     Multiple Vitamins-Minerals (DAILY MULTIVITAMIN PO) Take by mouth daily        Multiple Vitamins-Minerals (ZINC PO)        VITAMIN D PO        ALLERGIES  Allergies   Allergen Reactions     Lactose GI Disturbance     Penicillins Hives     As a child (7 or 8), amoxicillin      SOCIAL HISTORY:  Smokes: No  EtOH: Yes, rarely once per month  Exercise: active    Is an . Previously worked at [employer] and reports heavy chemical exposure.     ROS:  Change in vision No  Headaches: no  Respiratory: No shortness of breath, dyspnea on exertion, cough, or hemoptysis   Cardiovascular: negative   Gastrointestinal: negative Abdominal pain: no  Breast: left breast skin lesion   Musculoskeletal: negative Joint pain No Back pain: no  Psychiatric: negative  Hematologic/Lymphatic/Immunologic: negative  Endocrine: negative    EXAM  /85   Pulse 73   Temp 98.1  F (36.7  C) (Oral)   Resp 16   Ht 1.664 m (5' 5.5\")   Wt 66.2 kg (146 lb)   LMP 12/27/2021   SpO2 100%   BMI 23.93 kg/m     PHYSICAL EXAM  Respiratory: breathing non labored.   Breasts: Examination was done in both the upright and supine positions.  Breasts are symmetrical . No dominant fixed or suspicious masses noted. No skin or nipple changes. No nipple discharge.   Left breast subtle light brown macule of irregular shape at 12:00 to 3:00 near the areolar boarder.   Right breast palpated generally more dense than the left breast. There is an area on the right breast in the superior and lateral part of the breast that is extremely nodular and fibroglandular. No dominant mass.   No clavicular, cervical, or axillary lymphadenopathy.     INVESTIGATIONS:    1/19/22 " left breast ultrasound: no concerning findings per Radiology, final report pending.     ASSESSMENT/PLAN:    Loreta Encarnacion is a 26 year old woman left breast skin lesion, improving. There are no concerning findings on imaging or exam.     1) Left breast skin lesion now improving. No concerning findings on imaging or exam. Will consider a punch biopsy and or dermatology referral if this area does not continue to improve and resolved  - Follow up in 3 months.     2) Family history of breast cancer  Be familiar with your breast and how they normally feel and appear. Promptly report any changes to your healthcare provider. Beginning screening mammograms at age 40.     3) Lifestyle Modifications were provided.   - Maintain a healthy weight (BMI 20-25). Higher body mass index (BMI) and adult weight gain is associated with increased risk for breast cancer. This increase in risk has been attributed to increase in circulating endogenous estrogen levels from fat tissue.   - Alcohol consumption, even at moderate levels (1-2 drinks per day), increases breast cancer risk and are best avoided. If you choose to drink alcohol limit alcohol consumption to less than 1 drink per day. (1 ounce of liquor, 6 ounces of wine, or 8 ounces of beer)  - Be active daily and void being sedentary. Take part in at least 150-300 minutes of moderate-intensity physical activity per week.     Hannah Prabhakar PA-C    30 minutes spent on the date of the encounter doing chart review, review of test results, interpretation of tests, patient visit and documentation.

## 2022-01-19 ENCOUNTER — ANCILLARY PROCEDURE (OUTPATIENT)
Dept: MAMMOGRAPHY | Facility: CLINIC | Age: 27
End: 2022-01-19
Attending: PHYSICIAN ASSISTANT
Payer: COMMERCIAL

## 2022-01-19 ENCOUNTER — OFFICE VISIT (OUTPATIENT)
Dept: SURGERY | Facility: CLINIC | Age: 27
End: 2022-01-19
Attending: PHYSICIAN ASSISTANT
Payer: COMMERCIAL

## 2022-01-19 VITALS
DIASTOLIC BLOOD PRESSURE: 85 MMHG | RESPIRATION RATE: 16 BRPM | TEMPERATURE: 98.1 F | SYSTOLIC BLOOD PRESSURE: 123 MMHG | HEART RATE: 73 BPM | HEIGHT: 66 IN | WEIGHT: 146 LBS | OXYGEN SATURATION: 100 % | BODY MASS INDEX: 23.46 KG/M2

## 2022-01-19 DIAGNOSIS — N64.4 BREAST PAIN, LEFT: ICD-10-CM

## 2022-01-19 DIAGNOSIS — L98.8 SKIN LESION OF BREAST: Primary | ICD-10-CM

## 2022-01-19 PROCEDURE — 76642 ULTRASOUND BREAST LIMITED: CPT | Mod: LT | Performed by: RADIOLOGY

## 2022-01-19 PROCEDURE — 99214 OFFICE O/P EST MOD 30 MIN: CPT | Performed by: PHYSICIAN ASSISTANT

## 2022-01-19 ASSESSMENT — MIFFLIN-ST. JEOR: SCORE: 1406.06

## 2022-01-19 ASSESSMENT — PAIN SCALES - GENERAL: PAINLEVEL: NO PAIN (0)

## 2022-01-19 NOTE — LETTER
1/19/2022         RE: Loreta Encarnacion  [address]  [facility] [address]        Dear Colleague,    Thank you for referring your patient, Loreta Encarnacion, to the [facility]. Please see a copy of my visit note below.    NEW CONSULTATION   Jan 19, 2022     Loreta Encarnacion is a 26 year old woman who presents with breast pain.     HPI:    Her  notice a left breast area of skin redness in mid December. The pigment changed to a brown color that has persisted but fading. She denies any mass, pain, fever, nipple inversion or nipple discharge. She is not applying anything to the skin.     BREAST-SPECIFIC HISTORY:    Previous breast imaging: No    Prior breast biopsies/surgeries: No    Prior history of breast cancer or DCIS: No  Prior radiation history: No  Self breast exams: Yes  Breast density:     GYN HISTORY:  G0  Age at menarche: 10  Menopausal: premonpausal.   Menopausal hormone replacement therapy: No     RISK ASSESSMENT: < 20% lifetime risk   Sarahi:n/a  CHAZ/Lesvia: 19.2% lifetime risk  Frankie: 8.3% lifetime risk    FAMILY HISTORY:  Breast ca: Yes   - paternal aunt, 40  Ovarian ca: No  Pancreatic ca: No  Prostate: No  Gastric ca: No  Melanoma: No  Colon ca: No  Other cancer: No  Other genetic, testing, syndromes, or clotting disorders: no     PAST MEDICAL HISTORY  Past Medical History:   Diagnosis Date     ADHD (attention deficit hyperactivity disorder)      Constipation      Epigastric pain 01/10/2018    S/p w/u in [place] (neg US for gallstones, neg EGD for ulcers, improved w/ limited fatty foods)     Hemorrhoids      MAURA III (vulvar intraepithelial neoplasia III)     Spontaneous resolution, CO2 laser 5/30/17. Vulvoscopy neg 1/31/18. [] Vulvoscopy ~8/2018      PAST SURGICAL HISTORY   Past Surgical History:   Procedure Laterality Date     LASER CO2 VULVA N/A 5/30/2017    Procedure: LASER CO2 VULVA;  Wide Local CO2 Laser Of Vulva;  Surgeon: Amaury  "Yaquelin Fish MD;  Location: [facility]     MEDICATIONS  Current Outpatient Medications   Medication Sig Dispense Refill     amphetamine-dextroamphetamine (ADDERALL) 5 MG tablet Take 1 tablet (5 mg) by mouth daily in the early afternoon. 30 tablet 0     Ascorbic Acid (VITAMIN C PO)        imiquimod (ALDARA) 5 % external cream Apply a small sized amount to warts or molluscum three times weekly at bedtime.   Wash off after 8 hours.   May use for up to 16 weeks. 12 packet 3     lisdexamfetamine (VYVANSE) 40 MG capsule Take 1 capsule (40 mg) by mouth every morning 90 capsule 0     Multiple Vitamins-Minerals (DAILY MULTIVITAMIN PO) Take by mouth daily        Multiple Vitamins-Minerals (ZINC PO)        VITAMIN D PO        ALLERGIES  Allergies   Allergen Reactions     Lactose GI Disturbance     Penicillins Hives     As a child (7 or 8), amoxicillin      SOCIAL HISTORY:  Smokes: No  EtOH: Yes, rarely once per month  Exercise: active    Is an . Previously worked at [employer] and reports heavy chemical exposure.     ROS:  Change in vision No  Headaches: no  Respiratory: No shortness of breath, dyspnea on exertion, cough, or hemoptysis   Cardiovascular: negative   Gastrointestinal: negative Abdominal pain: no  Breast: left breast skin lesion   Musculoskeletal: negative Joint pain No Back pain: no  Psychiatric: negative  Hematologic/Lymphatic/Immunologic: negative  Endocrine: negative    EXAM  /85   Pulse 73   Temp 98.1  F (36.7  C) (Oral)   Resp 16   Ht 1.664 m (5' 5.5\")   Wt 66.2 kg (146 lb)   LMP 12/27/2021   SpO2 100%   BMI 23.93 kg/m     PHYSICAL EXAM  Respiratory: breathing non labored.   Breasts: Examination was done in both the upright and supine positions.  Breasts are symmetrical . No dominant fixed or suspicious masses noted. No skin or nipple changes. No nipple discharge.   Left breast subtle light brown macule of irregular shape at 12:00 to 3:00 near the areolar boarder.   Right breast palpated " generally more dense than the left breast. There is an area on the right breast in the superior and lateral part of the breast that is extremely nodular and fibroglandular. No dominant mass.   No clavicular, cervical, or axillary lymphadenopathy.     INVESTIGATIONS:    1/19/22 left breast ultrasound: no concerning findings per Radiology, final report pending.     ASSESSMENT/PLAN:    Loreta Encarnacion is a 26 year old woman left breast skin lesion, improving. There are no concerning findings on imaging or exam.     1) Left breast skin lesion now improving. No concerning findings on imaging or exam. Will consider a punch biopsy and or dermatology referral if this area does not continue to improve and resolved  - Follow up in 3 months.     2) Family history of breast cancer  Be familiar with your breast and how they normally feel and appear. Promptly report any changes to your healthcare provider. Beginning screening mammograms at age 40.     3) Lifestyle Modifications were provided.   - Maintain a healthy weight (BMI 20-25). Higher body mass index (BMI) and adult weight gain is associated with increased risk for breast cancer. This increase in risk has been attributed to increase in circulating endogenous estrogen levels from fat tissue.   - Alcohol consumption, even at moderate levels (1-2 drinks per day), increases breast cancer risk and are best avoided. If you choose to drink alcohol limit alcohol consumption to less than 1 drink per day. (1 ounce of liquor, 6 ounces of wine, or 8 ounces of beer)  - Be active daily and void being sedentary. Take part in at least 150-300 minutes of moderate-intensity physical activity per week.     Hannah Prabhakar PA-C    30 minutes spent on the date of the encounter doing chart review, review of test results, interpretation of tests, patient visit and documentation.             Again, thank you for allowing me to participate in the care of your patient.       Sincerely,    Hannah Prabhakar PA-C

## 2022-01-19 NOTE — PATIENT INSTRUCTIONS
Loreta Encarnacion is a 26 year old woman left breast skin lesion, improving. There are no concerning findings on imaging or exam.     1) Left breast skin lesion now improving. No concerning findings on imaging or exam. Will consider a punch biopsy and or dermatology referral if this area does not continue to improve and resolved  - Follow up in 3 months.     2) Family history of breast cancer  Be familiar with your breast and how they normally feel and appear. Promptly report any changes to your healthcare provider. Beginning screening mammograms at age 40.     3) Lifestyle Modifications were provided.   - Maintain a healthy weight (BMI 20-25). Higher body mass index (BMI) and adult weight gain is associated with increased risk for breast cancer. This increase in risk has been attributed to increase in circulating endogenous estrogen levels from fat tissue.   - Alcohol consumption, even at moderate levels (1-2 drinks per day), increases breast cancer risk and are best avoided. If you choose to drink alcohol limit alcohol consumption to less than 1 drink per day. (1 ounce of liquor, 6 ounces of wine, or 8 ounces of beer)  - Be active daily and void being sedentary. Take part in at least 150-300 minutes of moderate-intensity physical activity per week.

## 2022-01-25 NOTE — TELEPHONE ENCOUNTER
----- Message from Tyler June sent at 4/26/2017 12:43 PM CDT -----  Regarding: surgery questions  Patient mom has questions regarding surgery, please call Kimberly at [phone].   none

## 2022-02-01 NOTE — PROGRESS NOTES
ASSESSMENT AND PLAN:     (F41.9) Anxiety  (primary encounter diagnosis)  Comment: New chronic problem.  Anxiety is still primarily performance driven but has started to develop more generalized features. I think a trial of on demand propranolol 10-20mg might be helpful to control the physical symptoms of anxiety. I reviewed the possible side effects of propranolol. Encouraged trying first on a low-stakes day so she can do some dose range finding and see how well she tolerates.     [facility] is. Consider hydroxyzine PRN vs ssri if unsuccessful.     I'm also looking into therapists who might have experience with performance artists.     Plan: propranolol (INDERAL) 10 MG tablet          (Z91.018) Food allergy  Comment: Symptoms sound most consistent with an oral/pollen food allergy syndrome. Bread wouldn't be a trigger but I'm wondering if she's actually reacting to tomatoes or some other ingredient. Referring non-urgently to allergy and provided with list of common OFAS allergens so she can be more attentive to possible triggers.   Plan: Adult Allergy/Asthma Referral          (F90.0) Attention deficit hyperactivity disorder (ADHD), predominantly inattentive type  Comment: Stable but Loreta and I both think she would benefit from developing a relationship with a psychiatrist to assist with medication management given how difficult it has become to distinguish how much the Vyvanse is helping with her symptoms vs worsening anxiety. I did let her know that there will likely be a several month wait to get in.   Plan: Adult Mental Health  Referral            I spent a total of 43 minutes on the day of the visit.   Time spent doing chart review, history and exam, documentation and further activities per the note      Ramses Velasquez MD   [facility]  02/04/2022, 1:02 PM      SUBJECTIVE:   Loreta is a 26 year old female who presents to clinic today for  "a return visit.    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 but has had so many auditions recently that it is feeling more pervasive/ingrained  - feels that body is enclosing in on itself  - combination of psychological and physical symptoms - gives example of uncontrollable leg shaking during a recent audition    - is able to relax sometimes  - sometimes has too many concerns/worries to allow her to relax    - has started to affect sleep this past year, will wake up early with anxiety and be unable to fall back asleep    - had virtual visit with sleep medicine 11/18/21, recommended 2 week sleep diary with actigraphy before overnight sleep study with nap study, needs to be off stimulant for at least 2 weeks before testing      PHQ 2/4/2022   PHQ-9 Total Score 6   Q9: Thoughts of better off dead/self-harm past 2 weeks Not at all     JANNA-7 SCORE 10/10/2016 1/30/2018 2/4/2022   Total Score 6 (mild anxiety) 2 (minimal anxiety) -   Total Score - 2 7         # Concern for wheat allergy  - since college, has noticed that when eating certain foods would get a \"tickling\" in cheeks  - would only happen a few times a year  - last Thanksgiving - Thanksgiving meal was fine, had leftovers the next day -  Same meal but this time with gravy (gravy was the only thing with flour in it)  - within 10-15 minutes of starting eating developed: itchiness in cheeks (\"Under the skin\") and going into anterior chin and then into neck/chest; flushing in cheeks; \"feels like ants crawling under my skin\"  - no raised areas/hives, no lip/tongue swelling  - lasted about 30 minutes  - has happened about 3-4 times " "a month since then, same symptoms but not as severe  - has been paying attention to triggers: pasta, breads  - only certain pastas (okay with premade mac and cheese from [employer])  - also okay with [employer] 7 grain bread from [employer]    - does get pretty significant seasonal allergies - worse in the spring, mold allergies  - no known food allergies    Patient Active Problem List   Diagnosis     Vulvar intraepithelial neoplasia (MAURA) grade 3     Family history of ischemic heart disease     Attention deficit hyperactivity disorder (ADHD), predominantly inattentive type     Current Outpatient Medications   Medication     amphetamine-dextroamphetamine (ADDERALL) 5 MG tablet     Ascorbic Acid (VITAMIN C PO)     lisdexamfetamine (VYVANSE) 40 MG capsule     Multiple Vitamins-Minerals (DAILY MULTIVITAMIN PO)     Multiple Vitamins-Minerals (ZINC PO)     propranolol (INDERAL) 10 MG tablet     VITAMIN D PO     imiquimod (ALDARA) 5 % external cream     No current facility-administered medications for this visit.       I have reviewed the patient's relevant past medical history.     OBJECTIVE:   /79 (BP Location: Left arm, Patient Position: Sitting, Cuff Size: Adult Regular)   Pulse 75   Temp 96.9  F (36.1  C) (Esophageal)   Ht 1.676 m (5' 6\")   Wt 63.7 kg (140 lb 8 oz)   SpO2 98%   BMI 22.68 kg/m      Constitutional: well-appearing, appears stated age  Eyes: conjunctivae without erythema, sclera anicteric.   Skin: no rashes, lesions, or wounds  Psych: affect is full and appropriate, speech is fluent and non-pressured  "

## 2022-02-04 ENCOUNTER — OFFICE VISIT (OUTPATIENT)
Dept: FAMILY MEDICINE | Facility: CLINIC | Age: 27
End: 2022-02-04
Payer: COMMERCIAL

## 2022-02-04 VITALS
BODY MASS INDEX: 22.58 KG/M2 | OXYGEN SATURATION: 98 % | SYSTOLIC BLOOD PRESSURE: 118 MMHG | HEIGHT: 66 IN | HEART RATE: 75 BPM | WEIGHT: 140.5 LBS | DIASTOLIC BLOOD PRESSURE: 79 MMHG | TEMPERATURE: 96.9 F

## 2022-02-04 DIAGNOSIS — Z91.018 FOOD ALLERGY: ICD-10-CM

## 2022-02-04 DIAGNOSIS — F41.9 ANXIETY: Primary | ICD-10-CM

## 2022-02-04 DIAGNOSIS — F90.0 ATTENTION DEFICIT HYPERACTIVITY DISORDER (ADHD), PREDOMINANTLY INATTENTIVE TYPE: ICD-10-CM

## 2022-02-04 RX ORDER — PROPRANOLOL HYDROCHLORIDE 10 MG/1
10-20 TABLET ORAL 3 TIMES DAILY PRN
Qty: 30 TABLET | Refills: 2 | Status: SHIPPED | OUTPATIENT
Start: 2022-02-04

## 2022-02-04 ASSESSMENT — ANXIETY QUESTIONNAIRES
3. WORRYING TOO MUCH ABOUT DIFFERENT THINGS: MORE THAN HALF THE DAYS
7. FEELING AFRAID AS IF SOMETHING AWFUL MIGHT HAPPEN: NOT AT ALL
1. FEELING NERVOUS, ANXIOUS, OR ON EDGE: NEARLY EVERY DAY
6. BECOMING EASILY ANNOYED OR IRRITABLE: NOT AT ALL
5. BEING SO RESTLESS THAT IT IS HARD TO SIT STILL: NOT AT ALL
IF YOU CHECKED OFF ANY PROBLEMS ON THIS QUESTIONNAIRE, HOW DIFFICULT HAVE THESE PROBLEMS MADE IT FOR YOU TO DO YOUR WORK, TAKE CARE OF THINGS AT HOME, OR GET ALONG WITH OTHER PEOPLE: EXTREMELY DIFFICULT
GAD7 TOTAL SCORE: 7
2. NOT BEING ABLE TO STOP OR CONTROL WORRYING: SEVERAL DAYS

## 2022-02-04 ASSESSMENT — PATIENT HEALTH QUESTIONNAIRE - PHQ9
5. POOR APPETITE OR OVEREATING: SEVERAL DAYS
SUM OF ALL RESPONSES TO PHQ QUESTIONS 1-9: 6

## 2022-02-04 ASSESSMENT — MIFFLIN-ST. JEOR: SCORE: 1389.05

## 2022-02-04 NOTE — NURSING NOTE
"26 year old  Chief Complaint   Patient presents with     Anxiety     discuss anxiety       Referral     possible wheat allergies        Blood pressure 118/79, pulse 75, temperature 96.9  F (36.1  C), temperature source Esophageal, height 1.676 m (5' 6\"), weight 63.7 kg (140 lb 8 oz), SpO2 98 %, not currently breastfeeding. Body mass index is 22.68 kg/m .  Patient Active Problem List   Diagnosis     Vulvar intraepithelial neoplasia (MAURA) grade 3     Family history of ischemic heart disease     Attention deficit hyperactivity disorder (ADHD), predominantly inattentive type       Wt Readings from Last 2 Encounters:   02/04/22 63.7 kg (140 lb 8 oz)   01/19/22 66.2 kg (146 lb)     BP Readings from Last 3 Encounters:   02/04/22 118/79   01/19/22 123/85   09/20/21 117/71         Current Outpatient Medications   Medication     amphetamine-dextroamphetamine (ADDERALL) 5 MG tablet     Ascorbic Acid (VITAMIN C PO)     lisdexamfetamine (VYVANSE) 40 MG capsule     Multiple Vitamins-Minerals (DAILY MULTIVITAMIN PO)     Multiple Vitamins-Minerals (ZINC PO)     VITAMIN D PO     imiquimod (ALDARA) 5 % external cream     No current facility-administered medications for this visit.       Social History     Tobacco Use     Smoking status: Never Smoker     Smokeless tobacco: Never Used   Substance Use Topics     Alcohol use: Yes     Alcohol/week: 1.0 standard drink     Types: 1 Standard drinks or equivalent per week     Comment: Occasional     Drug use: No       Health Maintenance Due   Topic Date Due     ADVANCE CARE PLANNING  Never done     HEPATITIS C SCREENING  Never done     Anal Microscopy (high resolution anoscopy)  11/17/2018     PREVENTIVE CARE VISIT  01/31/2019     ANAL PAP SMEAR  02/26/2019     INFLUENZA VACCINE (1) 09/01/2021     PHQ-2  01/01/2022       Lab Results   Component Value Date    PAP LSIL 02/19/2021 February 4, 2022 11:05 AM  "

## 2022-02-05 ASSESSMENT — ANXIETY QUESTIONNAIRES: GAD7 TOTAL SCORE: 7

## 2022-03-04 ENCOUNTER — OFFICE VISIT (OUTPATIENT)
Dept: SLEEP MEDICINE | Facility: CLINIC | Age: 27
End: 2022-03-04
Payer: COMMERCIAL

## 2022-03-04 DIAGNOSIS — G47.10 HYPERSOMNIA: Primary | ICD-10-CM

## 2022-03-20 ENCOUNTER — HEALTH MAINTENANCE LETTER (OUTPATIENT)
Age: 27
End: 2022-03-20

## 2022-03-20 ENCOUNTER — TELEPHONE (OUTPATIENT)
Dept: SLEEP MEDICINE | Facility: CLINIC | Age: 27
End: 2022-03-20
Payer: COMMERCIAL

## 2022-03-21 NOTE — TELEPHONE ENCOUNTER
Called patient on her cell phone. Pt had situation last minute that caused her to go out of town and she is still in [place]. She needs to reschedule her sleep study and will be returning 03/30. Please call to reschedule.

## 2022-03-31 ENCOUNTER — TELEPHONE (OUTPATIENT)
Dept: SLEEP MEDICINE | Facility: CLINIC | Age: 27
End: 2022-03-31
Payer: COMMERCIAL

## 2022-03-31 NOTE — TELEPHONE ENCOUNTER
Phone call made to patient for upcoming appointment did not get cancel. Patient okay'd to cancelled. Patient states sleep study has been rescheduled. Patient has no further questions.       Yony Angel [facility]

## 2022-04-18 NOTE — PROGRESS NOTES
FOLLOW UP  Apr 20, 2022     Loreta Encarnacion is a 26 year old woman who presents for follow up of left breast skin lesion.      HPI:     She was seen in January for 1 month of improving area of left breast erythema. Imaging at that time with mammogram and ultrasound was negative.     Today she denies any mass, skin change, pain, fever, nipple inversion or nipple discharge. The skin lesion has resolved.      BREAST-SPECIFIC HISTORY:     Previous breast imaging: yes  - 1/19/22 BI-RADS 1     Prior breast biopsies/surgeries: No     Prior history of breast cancer or DCIS: No  Prior radiation history: No  Self breast exams: Yes  Breast density:      GYN HISTORY:  G0  Age at menarche: 10  Menopausal: premonpausal.   Menopausal hormone replacement therapy: No      RISK ASSESSMENT: < 20% lifetime risk   Sarahi:n/a  CHAZ/Lesvia: 23.9% lifetime risk  Frankie: 8.3% lifetime risk     FAMILY HISTORY:  Breast ca: Yes   - paternal aunt, 50's  Ovarian ca: No  Pancreatic ca: No  Prostate: No  Gastric ca: No  Melanoma: No  Colon ca: No  Other cancer: No  Other genetic, testing, syndromes, or clotting disorders: no     PAST MEDICAL HISTORY  Past Medical History:   Diagnosis Date     ADHD (attention deficit hyperactivity disorder)      Constipation      Epigastric pain 01/10/2018    S/p w/u in [place] (neg US for gallstones, neg EGD for ulcers, improved w/ limited fatty foods)     Hemorrhoids      MAURA III (vulvar intraepithelial neoplasia III)     Spontaneous resolution, CO2 laser 5/30/17. Vulvoscopy neg 1/31/18. [] Vulvoscopy ~8/2018      PAST SURGICAL HISTORY   Past Surgical History:   Procedure Laterality Date     LASER CO2 VULVA N/A 5/30/2017    Procedure: LASER CO2 VULVA;  Wide Local CO2 Laser Of Vulva;  Surgeon: Yaquelin Rebolledo MD;  Location[facility]     MEDICATIONS  Current Outpatient Medications   Medication Sig Dispense Refill     amphetamine-dextroamphetamine (ADDERALL) 5 MG tablet Take 1 tablet (5 mg) by mouth daily  in the early afternoon. 30 tablet 0     Ascorbic Acid (VITAMIN C PO)        imiquimod (ALDARA) 5 % external cream Apply a small sized amount to warts or molluscum three times weekly at bedtime.   Wash off after 8 hours.   May use for up to 16 weeks. (Patient not taking: Reported on 2/4/2022) 12 packet 3     lisdexamfetamine (VYVANSE) 40 MG capsule Take 1 capsule (40 mg) by mouth every morning 90 capsule 0     Multiple Vitamins-Minerals (DAILY MULTIVITAMIN PO) Take by mouth daily        Multiple Vitamins-Minerals (ZINC PO)        propranolol (INDERAL) 10 MG tablet Take 1-2 tablets (10-20 mg) by mouth 3 times daily as needed (performance-related anxiety) 30 tablet 2     VITAMIN D PO        ALLERGIES  Allergies   Allergen Reactions     Lactose GI Disturbance     Penicillins Hives     As a child (7 or 8), amoxicillin     SOCIAL HISTORY:  Smokes: No  EtOH: Yes, rarely once per month  Exercise: active    Is an . Previously worked at [employer] and reports heavy chemical exposure.     ROS:  Change in vision No  Headaches: no  Respiratory: No shortness of breath, dyspnea on exertion, cough, or hemoptysis   Cardiovascular: negative   Gastrointestinal: negative Abdominal pain: no  Breast: negative  Musculoskeletal: negative Joint pain No Back pain: no  Psychiatric: negative  Hematologic/Lymphatic/Immunologic: negative  Endocrine: negative    EXAM  /80   Pulse 77   Resp 16   Wt 61.7 kg (136 lb)   SpO2 100%   BMI 21.95 kg/m     PHYSICAL EXAM  Respiratory: breathing non labored.   Breasts: Examination was done in both the upright and supine positions.  Breasts are symmetrical . No dominant fixed or suspicious masses noted. No skin or nipple changes. No nipple discharge.   Nodular breast tissue palpated in the right superior breast and left upper outer breast.   No clavicular, cervical, or axillary lymphadenopathy.     ASSESSMENT/PLAN:    Loreta Encarnacion is a 26 year old woman with left breast skin lesion  now resolved. She meets NCCN guidelines for high risk screening based on her family history of breast cancer.      1) Family history of breast cancer  Discussed she meets NCCN guidelines for high risk screening based on family history with lifetime risk for breast cancer of >20%. Screening recommendations based on NCCN guidelines. Be familiar with your breast and promptly report any changes to your health care provider. Clinical encounter every 6-12 months. Annual mammogram with keagan starting 10 years prior to the youngest family member but not less than age 30 or 40 which ever come first. Annual breast MRI to begin 10 years prior to the youngest family member diagnosed but not less than 25 years or 40 which ever come first. Counseling was provided with available strategies including lifestyle modifications and risk reducing intervention.   - Follow up in 1 year for clinic visit with risk assessment and breast exam.   - Screening mammograms to begin at age 40.   - Consider breast MRI at age 40     2) Lifestyle Modifications were provided. - Maintain your best healthy weight. Higher body fat and adult weight gain is associated with increased risk for breast cancer. This increase in risk has been attributed to increase in circulating endogenous estrogen levels from fat tissue.   - Alcohol can raise estrogen. Alcohol consumption, even at moderate levels (1-2 drinks per day), increases breast cancer risk and are best avoided. If you choose to drink alcohol limit alcohol consumption to less than 1 drink per day. (1 ounce of liquor, 6 ounces of wine, or 8 ounces of beer).  - Be active daily and void being sedentary.     Hannah Prabhakar PA-C    20 minutes spent on the date of the encounter doing chart review, review of test results, interpretation of tests, patient visit and documentation.

## 2022-04-20 ENCOUNTER — OFFICE VISIT (OUTPATIENT)
Dept: SURGERY | Facility: CLINIC | Age: 27
End: 2022-04-20
Attending: PHYSICIAN ASSISTANT
Payer: COMMERCIAL

## 2022-04-20 VITALS
WEIGHT: 136 LBS | SYSTOLIC BLOOD PRESSURE: 122 MMHG | DIASTOLIC BLOOD PRESSURE: 80 MMHG | RESPIRATION RATE: 16 BRPM | HEART RATE: 77 BPM | OXYGEN SATURATION: 100 % | BODY MASS INDEX: 21.95 KG/M2

## 2022-04-20 DIAGNOSIS — Z80.3 FAMILY HISTORY OF BREAST CANCER: Primary | ICD-10-CM

## 2022-04-20 DIAGNOSIS — Z91.89 AT HIGH RISK FOR BREAST CANCER: ICD-10-CM

## 2022-04-20 PROCEDURE — 99213 OFFICE O/P EST LOW 20 MIN: CPT | Performed by: PHYSICIAN ASSISTANT

## 2022-04-20 NOTE — LETTER
4/20/2022         RE: Loreta Encarnacion  [address]  [facility] [address]        Dear Colleague,    Thank you for referring your patient, Loreta Encarnacion, to the [facility]. Please see a copy of my visit note below.    FOLLOW UP  Apr 20, 2022     Loreta Encarnacion is a 26 year old woman who presents for follow up of left breast skin lesion.      HPI:     She was seen in January for 1 month of improving area of left breast erythema. Imaging at that time with mammogram and ultrasound was negative.     Today she denies any mass, skin change, pain, fever, nipple inversion or nipple discharge. The skin lesion has resolved.      BREAST-SPECIFIC HISTORY:     Previous breast imaging: yes  - 1/19/22 BI-RADS 1     Prior breast biopsies/surgeries: No     Prior history of breast cancer or DCIS: No  Prior radiation history: No  Self breast exams: Yes  Breast density:      GYN HISTORY:  G0  Age at menarche: 10  Menopausal: premonpausal.   Menopausal hormone replacement therapy: No      RISK ASSESSMENT: < 20% lifetime risk   Sarahi:n/a  CHAZ/Lesvia: 23.9% lifetime risk  Frankie: 8.3% lifetime risk     FAMILY HISTORY:  Breast ca: Yes   - paternal aunt, 50's  Ovarian ca: No  Pancreatic ca: No  Prostate: No  Gastric ca: No  Melanoma: No  Colon ca: No  Other cancer: No  Other genetic, testing, syndromes, or clotting disorders: no     PAST MEDICAL HISTORY  Past Medical History:   Diagnosis Date     ADHD (attention deficit hyperactivity disorder)      Constipation      Epigastric pain 01/10/2018    S/p w/u in [place] (neg US for gallstones, neg EGD for ulcers, improved w/ limited fatty foods)     Hemorrhoids      MAURA III (vulvar intraepithelial neoplasia III)     Spontaneous resolution, CO2 laser 5/30/17. Vulvoscopy neg 1/31/18. [] Vulvoscopy ~8/2018      PAST SURGICAL HISTORY   Past Surgical History:   Procedure Laterality Date     LASER CO2 VULVA N/A 5/30/2017    Procedure: LASER  CO2 VULVA;  Wide Local CO2 Laser Of Vulva;  Surgeon: Yaquelin Rebolledo MD;  Location: [facility] OR     MEDICATIONS  Current Outpatient Medications   Medication Sig Dispense Refill     amphetamine-dextroamphetamine (ADDERALL) 5 MG tablet Take 1 tablet (5 mg) by mouth daily in the early afternoon. 30 tablet 0     Ascorbic Acid (VITAMIN C PO)        imiquimod (ALDARA) 5 % external cream Apply a small sized amount to warts or molluscum three times weekly at bedtime.   Wash off after 8 hours.   May use for up to 16 weeks. (Patient not taking: Reported on 2/4/2022) 12 packet 3     lisdexamfetamine (VYVANSE) 40 MG capsule Take 1 capsule (40 mg) by mouth every morning 90 capsule 0     Multiple Vitamins-Minerals (DAILY MULTIVITAMIN PO) Take by mouth daily        Multiple Vitamins-Minerals (ZINC PO)        propranolol (INDERAL) 10 MG tablet Take 1-2 tablets (10-20 mg) by mouth 3 times daily as needed (performance-related anxiety) 30 tablet 2     VITAMIN D PO        ALLERGIES  Allergies   Allergen Reactions     Lactose GI Disturbance     Penicillins Hives     As a child (7 or 8), amoxicillin     SOCIAL HISTORY:  Smokes: No  EtOH: Yes, rarely once per month  Exercise: active    Is an . Previously worked at [employer] and reports heavy chemical exposure.     ROS:  Change in vision No  Headaches: no  Respiratory: No shortness of breath, dyspnea on exertion, cough, or hemoptysis   Cardiovascular: negative   Gastrointestinal: negative Abdominal pain: no  Breast: negative  Musculoskeletal: negative Joint pain No Back pain: no  Psychiatric: negative  Hematologic/Lymphatic/Immunologic: negative  Endocrine: negative    EXAM  /80   Pulse 77   Resp 16   Wt 61.7 kg (136 lb)   SpO2 100%   BMI 21.95 kg/m     PHYSICAL EXAM  Respiratory: breathing non labored.   Breasts: Examination was done in both the upright and supine positions.  Breasts are symmetrical . No dominant fixed or suspicious masses noted. No skin or nipple  changes. No nipple discharge.   Nodular breast tissue palpated in the right superior breast and left upper outer breast.   No clavicular, cervical, or axillary lymphadenopathy.     ASSESSMENT/PLAN:    Loreta Encarnacion is a 26 year old woman with left breast skin lesion now resolved. She meets NCCN guidelines for high risk screening based on her family history of breast cancer.      1) Family history of breast cancer  Discussed she meets NCCN guidelines for high risk screening based on family history with lifetime risk for breast cancer of >20%. Screening recommendations based on NCCN guidelines. Be familiar with your breast and promptly report any changes to your health care provider. Clinical encounter every 6-12 months. Annual mammogram with keagan starting 10 years prior to the youngest family member but not less than age 30 or 40 which ever come first. Annual breast MRI to begin 10 years prior to the youngest family member diagnosed but not less than 25 years or 40 which ever come first. Counseling was provided with available strategies including lifestyle modifications and risk reducing intervention.   - Follow up in 1 year for clinic visit with risk assessment and breast exam.   - Screening mammograms to begin at age 40.   - Consider breast MRI at age 40     2) Lifestyle Modifications were provided. - Maintain your best healthy weight. Higher body fat and adult weight gain is associated with increased risk for breast cancer. This increase in risk has been attributed to increase in circulating endogenous estrogen levels from fat tissue.   - Alcohol can raise estrogen. Alcohol consumption, even at moderate levels (1-2 drinks per day), increases breast cancer risk and are best avoided. If you choose to drink alcohol limit alcohol consumption to less than 1 drink per day. (1 ounce of liquor, 6 ounces of wine, or 8 ounces of beer).  - Be active daily and void being sedentary.       20 minutes spent on the date of  the encounter doing chart review, review of test results, interpretation of tests, patient visit and documentation.           Again, thank you for allowing me to participate in the care of your patient.      Sincerely,    Hannah Prabhakar PA-C

## 2022-05-16 ENCOUNTER — MYC REFILL (OUTPATIENT)
Dept: FAMILY MEDICINE | Facility: CLINIC | Age: 27
End: 2022-05-16
Payer: COMMERCIAL

## 2022-05-16 ENCOUNTER — HEALTH MAINTENANCE LETTER (OUTPATIENT)
Age: 27
End: 2022-05-16

## 2022-05-16 DIAGNOSIS — F90.0 ATTENTION DEFICIT HYPERACTIVITY DISORDER (ADHD), PREDOMINANTLY INATTENTIVE TYPE: ICD-10-CM

## 2022-05-17 RX ORDER — DEXTROAMPHETAMINE SACCHARATE, AMPHETAMINE ASPARTATE, DEXTROAMPHETAMINE SULFATE AND AMPHETAMINE SULFATE 1.25; 1.25; 1.25; 1.25 MG/1; MG/1; MG/1; MG/1
5 TABLET ORAL DAILY
Qty: 30 TABLET | Refills: 0 | Status: SHIPPED | OUTPATIENT
Start: 2022-05-17 | End: 2022-11-16

## 2022-05-17 RX ORDER — LISDEXAMFETAMINE DIMESYLATE 40 MG/1
40 CAPSULE ORAL EVERY MORNING
Qty: 90 CAPSULE | Refills: 0 | Status: SHIPPED | OUTPATIENT
Start: 2022-05-17 | End: 2022-12-13

## 2022-05-17 NOTE — TELEPHONE ENCOUNTER
Last visit 2/4/22, no future visit  Last refill vyvanse 40 mg 6/28/21  Last refill adderall 5 mg 10/30/21.  Checked  for both - sending pt [employer] message inquiring whether she has had any other provider sending in scripts for her, or is she just using sparingly.  Lorenza Loznao RN  [facility]    Dr Velasquez  Please see dates meds last refilled per , and pt's response to questions about that.    Forwarding for refills.  Lorenza Lozano RN  [facility]

## 2022-05-17 NOTE — CONFIDENTIAL NOTE
Approved refills, but I'll continue to encourage [facility] to establish care with a psychiatrist as per our last visit in Feb 2022.    Ramses Velasquez MD on 5/17/2022 at 3:09 PM

## 2022-08-12 ENCOUNTER — TELEPHONE (OUTPATIENT)
Dept: SURGERY | Facility: CLINIC | Age: 27
End: 2022-08-12

## 2022-08-12 NOTE — TELEPHONE ENCOUNTER
LVM to naveen the following appt Please Schedule This Appointment:     With: Radha Tinoco NP     Referring Provider: self     For / Appt Notes: micro     Appt Type: Microscopy     Appt Date/Time: next available       Thank you!

## 2022-09-11 ENCOUNTER — HEALTH MAINTENANCE LETTER (OUTPATIENT)
Age: 27
End: 2022-09-11

## 2022-09-19 ENCOUNTER — OFFICE VISIT (OUTPATIENT)
Dept: SLEEP MEDICINE | Facility: CLINIC | Age: 27
End: 2022-09-19
Payer: COMMERCIAL

## 2022-09-19 DIAGNOSIS — G47.10 HYPERSOMNIA: Primary | ICD-10-CM

## 2022-09-21 ENCOUNTER — OFFICE VISIT (OUTPATIENT)
Dept: FAMILY MEDICINE | Facility: CLINIC | Age: 27
End: 2022-09-21
Payer: COMMERCIAL

## 2022-09-21 VITALS
DIASTOLIC BLOOD PRESSURE: 79 MMHG | BODY MASS INDEX: 23.57 KG/M2 | OXYGEN SATURATION: 98 % | HEART RATE: 73 BPM | TEMPERATURE: 97.6 F | WEIGHT: 146 LBS | RESPIRATION RATE: 12 BRPM | SYSTOLIC BLOOD PRESSURE: 117 MMHG

## 2022-09-21 DIAGNOSIS — B35.4 TINEA CORPORIS: Primary | ICD-10-CM

## 2022-09-21 RX ORDER — FLUCONAZOLE 150 MG/1
TABLET ORAL
Qty: 2 TABLET | Refills: 0 | Status: SHIPPED | OUTPATIENT
Start: 2022-09-21 | End: 2023-05-17

## 2022-09-21 RX ORDER — CLOTRIMAZOLE 1 %
CREAM (GRAM) TOPICAL 2 TIMES DAILY
Qty: 30 G | Refills: 0 | Status: SHIPPED | OUTPATIENT
Start: 2022-09-21

## 2022-09-21 RX ORDER — HYDROCORTISONE 2.5 %
CREAM (GRAM) TOPICAL 2 TIMES DAILY PRN
Qty: 20 G | Refills: 0 | Status: SHIPPED | OUTPATIENT
Start: 2022-09-21

## 2022-09-21 NOTE — PATIENT INSTRUCTIONS
ASSESSMENT/PLAN:    Tinea corporis on dorsum of RIGHT foot in a 27 yo with a history of repeated tinea infections.   Treat with both oral and topical  Fluconazole 150 mg today and then again on Sept 28  Avoid alcohol while taking the medication  Lotrimin cream  Also, Hydrocortisone if itching  Minimize sugar in diet  If symptoms persist, let us know.      --John Hernandez MD

## 2022-09-21 NOTE — PROGRESS NOTES
Medical assistant intake:  Loreta Encarnacion is a 26 year old female who presents to [facility] today for Ringworm (Right top of foot, very prone to infections all over body) and Referral (Dermatology)        ASSESSMENT/PLAN:    Tinea corporis on dorsum of RIGHT foot in a 25 yo with a history of repeated tinea infections.   1. Treat with both oral and topical  a. Fluconazole 150 mg today and then again on Sept 28  i. Avoid alcohol while taking the medication  b. Lotrimin cream  c. Also, Hydrocortisone if itching  2. Minimize sugar in diet  3. If symptoms persist, let us know.      --John Hernandez MD      SUBJECTIVE:   Loreta is a 26-year-old who presents due to a fungal infection on her right foot.  She reports a history of intermittent fungal infections at various parts of her body.  She has never been formally evaluated or treated for these.    She is originally from [facility].  She was then raised in [place].  She is now in the [place] she is an .    Review Of Systems:    Has otherwise been in usual state of health, e.g.   Cardiovascular: negative  Respiratory: No shortness of breath, dyspnea on exertion, cough, or hemoptysis  Gastrointestinal: negative  Genitourinary: negative    Problem list per EMR:  Patient Active Problem List   Diagnosis     Vulvar intraepithelial neoplasia (MAURA) grade 3     Family history of ischemic heart disease     Attention deficit hyperactivity disorder (ADHD), predominantly inattentive type       Current Outpatient Medications   Medication Sig Dispense Refill     amphetamine-dextroamphetamine (ADDERALL) 5 MG tablet Take 1 tablet (5 mg) by mouth daily in the early afternoon. 30 tablet 0     Ascorbic Acid (VITAMIN C PO)        lisdexamfetamine (VYVANSE) 40 MG capsule Take 1 capsule (40 mg) by mouth every morning 90 capsule 0     Multiple Vitamins-Minerals (DAILY MULTIVITAMIN PO) Take by mouth daily        Multiple Vitamins-Minerals (ZINC PO)         propranolol (INDERAL) 10 MG tablet Take 1-2 tablets (10-20 mg) by mouth 3 times daily as needed (performance-related anxiety) 30 tablet 2     VITAMIN D PO        imiquimod (ALDARA) 5 % external cream Apply a small sized amount to warts or molluscum three times weekly at bedtime.   Wash off after 8 hours.   May use for up to 16 weeks. (Patient not taking: No sig reported) 12 packet 3       Allergies   Allergen Reactions     Lactose GI Disturbance     Penicillins Hives     As a child (7 or 8), amoxicillin        Social:   . Born in [place], raised in [place].       OBJECTIVE    Vitals: /79 (BP Location: Right arm, Patient Position: Sitting, Cuff Size: Adult Regular)   Pulse 73   Temp 97.6  F (36.4  C) (Skin)   Resp 12   Wt 66.2 kg (146 lb)   LMP 09/14/2022 (Within Days)   SpO2 98%   BMI 23.57 kg/m    BMI= Body mass index is 23.57 kg/m .  Appears well.  RIGHT foot with a circular rash slightly bigger than quarter sized on dorsum of foot, just proximal to webspace between 1st and 2nd toes. Slightly scaly.   No other rashes noted, but she points to arms and upper thorax as areas that have been involved in recent past.     SEE TOP OF NOTE FOR ASSESSMENT AND PLAN    --John Hernandez MD  [facility], Department of Family Medicine and Community Health

## 2022-09-21 NOTE — NURSING NOTE
26 year old  Chief Complaint   Patient presents with     Ringworm     Right top of foot, very prone to infections all over body     Referral     Dermatology       Blood pressure 117/79, pulse 73, temperature 97.6  F (36.4  C), temperature source Skin, resp. rate 12, weight 66.2 kg (146 lb), last menstrual period 09/14/2022, SpO2 98 %, not currently breastfeeding. Body mass index is 23.57 kg/m .  Patient Active Problem List   Diagnosis     Vulvar intraepithelial neoplasia (MAURA) grade 3     Family history of ischemic heart disease     Attention deficit hyperactivity disorder (ADHD), predominantly inattentive type       Wt Readings from Last 2 Encounters:   09/21/22 66.2 kg (146 lb)   04/20/22 61.7 kg (136 lb)     BP Readings from Last 3 Encounters:   09/21/22 117/79   04/20/22 122/80   02/04/22 118/79         Current Outpatient Medications   Medication     amphetamine-dextroamphetamine (ADDERALL) 5 MG tablet     Ascorbic Acid (VITAMIN C PO)     lisdexamfetamine (VYVANSE) 40 MG capsule     Multiple Vitamins-Minerals (DAILY MULTIVITAMIN PO)     Multiple Vitamins-Minerals (ZINC PO)     propranolol (INDERAL) 10 MG tablet     VITAMIN D PO     imiquimod (ALDARA) 5 % external cream     No current facility-administered medications for this visit.       Social History     Tobacco Use     Smoking status: Never Smoker     Smokeless tobacco: Never Used   Substance Use Topics     Alcohol use: Yes     Alcohol/week: 1.0 standard drink     Types: 1 Standard drinks or equivalent per week     Comment: Occasional     Drug use: No       Health Maintenance Due   Topic Date Due     ADVANCE CARE PLANNING  Never done     HEPATITIS C SCREENING  Never done     Anal Microscopy (high resolution anoscopy)  11/17/2018     PREVENTIVE CARE VISIT  01/31/2019     ANAL PAP SMEAR  02/26/2019     COVID-19 Vaccine (4 - Booster for Pfizer series) 03/22/2022     PAP FOLLOW-UP  06/10/2022     HPV FOLLOW-UP  Never done     INFLUENZA VACCINE (1) 09/01/2022        Lab Results   Component Value Date    PAP LSIL 02/19/2021 September 21, 2022 1:48 PM

## 2022-10-02 ENCOUNTER — THERAPY VISIT (OUTPATIENT)
Dept: SLEEP MEDICINE | Facility: CLINIC | Age: 27
End: 2022-10-02
Payer: COMMERCIAL

## 2022-10-02 DIAGNOSIS — G47.10 HYPERSOMNIA: ICD-10-CM

## 2022-10-02 DIAGNOSIS — G47.00 INSOMNIA, UNSPECIFIED TYPE: ICD-10-CM

## 2022-10-02 DIAGNOSIS — G47.21 SLEEP DISORDER, CIRCADIAN, DELAYED SLEEP PHASE TYPE: ICD-10-CM

## 2022-10-02 DIAGNOSIS — R06.83 SNORING: ICD-10-CM

## 2022-10-02 PROCEDURE — 95810 POLYSOM 6/> YRS 4/> PARAM: CPT | Performed by: INTERNAL MEDICINE

## 2022-10-03 ENCOUNTER — DOCUMENTATION ONLY (OUTPATIENT)
Dept: SLEEP MEDICINE | Facility: CLINIC | Age: 27
End: 2022-10-03

## 2022-10-03 ENCOUNTER — THERAPY VISIT (OUTPATIENT)
Dept: SLEEP MEDICINE | Facility: CLINIC | Age: 27
End: 2022-10-03
Payer: COMMERCIAL

## 2022-10-03 LAB — CREAT UR-MCNC: 74 MG/DL

## 2022-10-03 PROCEDURE — 95805 MULTIPLE SLEEP LATENCY TEST: CPT | Performed by: INTERNAL MEDICINE

## 2022-10-03 PROCEDURE — 80307 DRUG TEST PRSMV CHEM ANLYZR: CPT

## 2022-10-03 NOTE — PATIENT INSTRUCTIONS
[place] [facility]    1. Your sleep study will be reviewed by a sleep physician within the next few days.     2. Please follow up in the sleep clinic as scheduled, or, make an appointment with your sleep provider to be seen within two weeks to discuss the results of the sleep study.    3. If you have any questions or problems with your treatment plan, please contact your sleep clinic provider at [phone] to further manage your condition.    4. Please review your attached medication list, and, at your follow-up appointment advise your sleep clinic provider about any changes.    5. Go to [email or web address] for more information about your sleep problems.    Yamila Post, RPSGT  October 3, 2022

## 2022-10-16 LAB — SLPCOMP: NORMAL

## 2022-11-16 ENCOUNTER — MYC REFILL (OUTPATIENT)
Dept: FAMILY MEDICINE | Facility: CLINIC | Age: 27
End: 2022-11-16

## 2022-11-16 DIAGNOSIS — F90.0 ATTENTION DEFICIT HYPERACTIVITY DISORDER (ADHD), PREDOMINANTLY INATTENTIVE TYPE: ICD-10-CM

## 2022-11-16 NOTE — TELEPHONE ENCOUNTER
Amphetamine-dexamphetamine (Adderall) 5 mg    Last Office Visit: 9/21/22  Future [facility] Appointments: None  Medication last refilled: 5/17/22 #30 with 0 refill(s)     verified - last fill date: 5/25/22 #30    Routing refill request to provider for review/approval because:  Drug not on the G refill protocol     MARQUISE CuetoN, RN, CCM

## 2022-11-17 RX ORDER — DEXTROAMPHETAMINE SACCHARATE, AMPHETAMINE ASPARTATE, DEXTROAMPHETAMINE SULFATE AND AMPHETAMINE SULFATE 1.25; 1.25; 1.25; 1.25 MG/1; MG/1; MG/1; MG/1
5 TABLET ORAL DAILY
Qty: 30 TABLET | Refills: 0 | Status: SHIPPED | OUTPATIENT
Start: 2022-11-17

## 2022-12-11 NOTE — PROGRESS NOTES
"ASSESSMENT AND PLAN:     (M21.611) Bunion, right  (primary encounter diagnosis)  Comment: Chronic, progressive.  Worsening discomfort in toe. Will send to podiatry to discuss options.   Plan: Orthopedic  Referral    (B35.4) Tinea corporis  Comment: Acute, uncomplicated.  Tinea infection has not resolved with topical clotrimazole and oral fluconazole.  Discussed potential side effects including liver inflammation with terbinafine - prescribed 2 week course in addition to topical ciclopirox.   If this doesn't resolve infection, plan to send to derm.   Plan: terbinafine (LAMISIL) 250 MG tablet, ciclopirox        (LOPROX) 0.77 % cream          (R68.2) Dry mouth  Comment: Chronic, stable.  Doubt related to propranolol  Switch cetirizine to loratadine to reduce anticholinergic effects  Trial of dry mouth rinse like biotene.     Ramses Velasquez MD   [facility]  12/12/2022, 4:54 PM      SUBJECTIVE:   Loreta is a 27 year old female who presents to clinic today for a return visit.    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 Hernandez for this 9/21/22, prescribed fluconazole 150mg PO to take twice a week apart and clotrimazole 1% twice a day  - Since high school and " middle school, had fungus on her arm since sh was 12, comes and goes and doesn't put anything on them   - in 2017, had a patch on the right leg that wouldn't go away  - Soaked very high dose, possibly pure tea tree oil with guaze and had it on her leg for weeks and it went away   - foot rash has gotten worse, spread  - not very itchy  - gets flaky scale around border  - Fluconazole and clotrimazole did not make it better       Patient Active Problem List   Diagnosis     Vulvar intraepithelial neoplasia (MAURA) grade 3     Family history of ischemic heart disease     Attention deficit hyperactivity disorder (ADHD), predominantly inattentive type     Current Outpatient Medications   Medication     amphetamine-dextroamphetamine (ADDERALL) 5 MG tablet     Ascorbic Acid (VITAMIN C PO)     clotrimazole (LOTRIMIN) 1 % external cream     fluconazole (DIFLUCAN) 150 MG tablet     hydrocortisone 2.5 % cream     imiquimod (ALDARA) 5 % external cream     lisdexamfetamine (VYVANSE) 40 MG capsule     Multiple Vitamins-Minerals (DAILY MULTIVITAMIN PO)     Multiple Vitamins-Minerals (ZINC PO)     propranolol (INDERAL) 10 MG tablet     VITAMIN D PO     No current facility-administered medications for this visit.       I have reviewed the patient's relevant past medical history.     OBJECTIVE:   /76 (BP Location: Left arm, Patient Position: Sitting, Cuff Size: Adult Regular)   Pulse 86   Temp 98.3  F (36.8  C) (Skin)   Resp 15   Wt 65.8 kg (145 lb)   LMP 12/04/2022   SpO2 97%   BMI 23.40 kg/m      Constitutional: well-appearing, appears stated age  Eyes: conjunctivae without erythema, sclera anicteric.     Skin: approx 5cm brown plaque dorsum, red raised border    Bunion deformity right 5th toe.

## 2022-12-12 ENCOUNTER — OFFICE VISIT (OUTPATIENT)
Dept: SURGERY | Facility: CLINIC | Age: 27
End: 2022-12-12
Payer: COMMERCIAL

## 2022-12-12 ENCOUNTER — OFFICE VISIT (OUTPATIENT)
Dept: FAMILY MEDICINE | Facility: CLINIC | Age: 27
End: 2022-12-12
Payer: COMMERCIAL

## 2022-12-12 VITALS
WEIGHT: 145.2 LBS | HEIGHT: 66 IN | SYSTOLIC BLOOD PRESSURE: 115 MMHG | OXYGEN SATURATION: 98 % | DIASTOLIC BLOOD PRESSURE: 80 MMHG | BODY MASS INDEX: 23.33 KG/M2 | HEART RATE: 98 BPM

## 2022-12-12 VITALS
BODY MASS INDEX: 23.4 KG/M2 | WEIGHT: 145 LBS | DIASTOLIC BLOOD PRESSURE: 76 MMHG | TEMPERATURE: 98.3 F | OXYGEN SATURATION: 97 % | SYSTOLIC BLOOD PRESSURE: 111 MMHG | RESPIRATION RATE: 15 BRPM | HEART RATE: 86 BPM

## 2022-12-12 DIAGNOSIS — D07.1 VULVAR INTRAEPITHELIAL NEOPLASIA (VIN) GRADE 3: Primary | ICD-10-CM

## 2022-12-12 DIAGNOSIS — K62.82 ANAL DYSPLASIA: ICD-10-CM

## 2022-12-12 DIAGNOSIS — R68.2 DRY MOUTH: ICD-10-CM

## 2022-12-12 DIAGNOSIS — M21.611 BUNION, RIGHT: Primary | ICD-10-CM

## 2022-12-12 DIAGNOSIS — B35.4 TINEA CORPORIS: ICD-10-CM

## 2022-12-12 LAB
LAB DIRECTOR COMMENTS: NORMAL
LAB DIRECTOR DISCLAIMER: NORMAL
LAB DIRECTOR INTERPRETATION: NORMAL
LAB DIRECTOR METHODOLOGY: NORMAL
LAB DIRECTOR RESULTS: NORMAL
SPECIMEN DESCRIPTION: NORMAL

## 2022-12-12 PROCEDURE — 88112 CYTOPATH CELL ENHANCE TECH: CPT | Mod: 26 | Performed by: PATHOLOGY

## 2022-12-12 PROCEDURE — G0452 MOLECULAR PATHOLOGY INTERPR: HCPCS | Mod: 26 | Performed by: PATHOLOGY

## 2022-12-12 PROCEDURE — 87624 HPV HI-RISK TYP POOLED RSLT: CPT | Performed by: NURSE PRACTITIONER

## 2022-12-12 PROCEDURE — 46601 DIAGNOSTIC ANOSCOPY: CPT | Performed by: NURSE PRACTITIONER

## 2022-12-12 PROCEDURE — 99213 OFFICE O/P EST LOW 20 MIN: CPT | Mod: 25 | Performed by: NURSE PRACTITIONER

## 2022-12-12 PROCEDURE — 88112 CYTOPATH CELL ENHANCE TECH: CPT | Mod: TC | Performed by: NURSE PRACTITIONER

## 2022-12-12 RX ORDER — CICLOPIROX OLAMINE 7.7 MG/G
CREAM TOPICAL 2 TIMES DAILY
Qty: 30 G | Refills: 1 | Status: SHIPPED | OUTPATIENT
Start: 2022-12-12

## 2022-12-12 RX ORDER — TERBINAFINE HYDROCHLORIDE 250 MG/1
250 TABLET ORAL DAILY
Qty: 14 TABLET | Refills: 0 | Status: SHIPPED | OUTPATIENT
Start: 2022-12-12 | End: 2022-12-26

## 2022-12-12 ASSESSMENT — PAIN SCALES - GENERAL: PAINLEVEL: NO PAIN (0)

## 2022-12-12 NOTE — PATIENT INSTRUCTIONS
1) Meet with a foot specialist to talk about options for Bunion     2) Try Biotene for dry mouth      3) Try loratadine (Claritin) instead of Cetirizine for allergies to help with dry mouth

## 2022-12-12 NOTE — PROGRESS NOTES
Colon and Rectal Surgery Clinic High Resolution Anoscopy Note    RE: Loreta Roth  : 1995  DEBBIE: 21    Loreta Roth is a 22 year old female with a history of MAURA 3 with ASCUS on anal Pap and AIN 1 in 2018 who presents today for high resolution anoscopy.     HPI: Loreta has been doing well. She gest some occasional blood in her stools with harder bowel movements. She had a colonoscopy with Dr. Arechiga on 3/19/18 with proctitis.   Last cervical biopsy in 221 with low grade dysplasia.  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    ASSESSMENT: Written, informed consent was obtained prior to procedure.  Prior to the start of the procedure and with procedural staff participation, I verbally confirmed the patient s identity using two indicators, relevant allergies, that the procedure was appropriate and matched the consent or emergent situation, and that the correct equipment/implants were available. Immediately prior to starting the procedure I conducted the Time Out with the procedural staff and re-confirmed the patient s name, procedure, and site/side. (The Joint Commission universal protocol was followed.)  Yes    Sedation (Moderate or Deep): None    Anal cytology was obtained with Dacron swab. Digital anal rectal exam was performed without any palpable lesions. Dilute acetic acid soak was completed for 2 minutes. Lubricant was used to insert the anoscope. Performed high resolution microscopy using the Proctostation. The dentate line was viewed in its entirety.  Continued proctitis in the rectum. No bright acetowhitening or punctation.  The perianal area was inspected after acetic acid soak. The findings noted were no additional lesions, acetowhitening, or punctation.     The patient tolerated the procedures well.          PLAN: No evidence of high grade dysplasia. Repeat high resolution anoscopy in one year.  Patient's questions were answered to her stated satisfaction and  she is in agreement with this plan.    For details of past medical history, surgical history, family history, medications, allergies, and review of systems, please see details below.    Medical history:  Past Medical History:   Diagnosis Date     ADHD (attention deficit hyperactivity disorder)      Constipation      Epigastric pain 01/10/2018    S/p w/u in [place] (neg US for gallstones, neg EGD for ulcers, improved w/ limited fatty foods)     Hemorrhoids      MAURA III (vulvar intraepithelial neoplasia III)     Spontaneous resolution, CO2 laser 5/30/17. Vulvoscopy neg 1/31/18. [] Vulvoscopy ~8/2018        Surgical history:  Past Surgical History:   Procedure Laterality Date     LASER CO2 VULVA N/A 5/30/2017    Procedure: LASER CO2 VULVA;  Wide Local CO2 Laser Of Vulva;  Surgeon: Yaquelin Rebolledo MD;  Location:  OR       Family history:  Family History   Problem Relation Age of Onset     Depression Mother         maybe bipolar disorder?     Sleep Apnea Mother      Hypertension Father      Anxiety Disorder Father      Depression Father      Diabetes Father         Type 1     Obesity Father      Cerebrovascular Disease Father 35        think it started earlier in life     Kidney failure Father      Sleep Apnea Father      Mental Illness Maternal Grandmother      Substance Abuse Maternal Grandfather         Alcohol     Depression Maternal Grandfather      Hypertension Paternal Grandmother      Depression Paternal Grandmother      Hypertension Paternal Grandfather      Mental Illness Paternal Grandfather         schizophrenia     Obesity Paternal Grandfather      Heart Disease Paternal Grandfather      Breast Cancer Paternal Aunt 60     Heart Disease Paternal Uncle 43     Mental Illness Paternal Half-Sister      Depression Paternal Half-Sister      Obesity Paternal Half-Sister      Obesity Cousin      Mental Illness Other         paranoid schizophrenia     Diabetes Other         Type 1     Ovarian Cancer No family  hx of      Colon Cancer No family hx of        Medications:  Current Outpatient Medications   Medication Sig Dispense Refill     amphetamine-dextroamphetamine (ADDERALL) 5 MG tablet Take 1 tablet (5 mg) by mouth daily in the early afternoon. 30 tablet 0     Ascorbic Acid (VITAMIN C PO)        clotrimazole (LOTRIMIN) 1 % external cream Apply topically 2 times daily 30 g 0     fluconazole (DIFLUCAN) 150 MG tablet Take 1 tab. Repeat in 1 week. 2 tablet 0     hydrocortisone 2.5 % cream Apply topically 2 times daily as needed for itching 20 g 0     imiquimod (ALDARA) 5 % external cream Apply a small sized amount to warts or molluscum three times weekly at bedtime.   Wash off after 8 hours.   May use for up to 16 weeks. (Patient not taking: No sig reported) 12 packet 3     lisdexamfetamine (VYVANSE) 40 MG capsule Take 1 capsule (40 mg) by mouth every morning 90 capsule 0     Multiple Vitamins-Minerals (DAILY MULTIVITAMIN PO) Take by mouth daily        Multiple Vitamins-Minerals (ZINC PO)        propranolol (INDERAL) 10 MG tablet Take 1-2 tablets (10-20 mg) by mouth 3 times daily as needed (performance-related anxiety) 30 tablet 2     VITAMIN D PO        Allergies:  The patientis allergic to lactose and penicillins.    Social history:  Social History     Tobacco Use     Smoking status: Never     Smokeless tobacco: Never   Substance Use Topics     Alcohol use: Yes     Alcohol/week: 1.0 standard drink     Types: 1 Standard drinks or equivalent per week     Comment: Occasional     Marital status: single.    Review of Systems:  There are no exam notes on file for this visit.     This procedure was performed under a collaborative agreement with Dr. Sean Pickens MD, Chief of Colon and Rectal Surgery, [facility].    45 minutes spent on the date of encounter (excluding time performing procedures with or without biopsy) performing chart review, history and exam, documentation and further activities as  noted above.    Radha Chakraborty, NP-C  Colon and Rectal Surgery  [facility]      This note was created using speech recognition software and may contain unintended word substitutions.

## 2022-12-12 NOTE — NURSING NOTE
"Chief Complaint   Patient presents with     RECHECK     Add-on HRA       Vitals:    12/12/22 1028   BP: 115/80   BP Location: Left arm   Patient Position: Sitting   Cuff Size: Adult Regular   Pulse: 98   SpO2: 98%   Weight: 145 lb 3.2 oz   Height: 5' 6\"       Body mass index is 23.44 kg/m .     Adam Mcintosh, EMT- P    "

## 2022-12-12 NOTE — NURSING NOTE
27 year old  Chief Complaint   Patient presents with     Derm Problem     Ringworm on foot not going away, bunion on R foot worse the past few weeks      Recheck Medication     Propranolol side effect of dry mouth       Blood pressure 111/76, pulse 86, temperature 98.3  F (36.8  C), temperature source Skin, resp. rate 15, weight 65.8 kg (145 lb), last menstrual period 12/04/2022, SpO2 97 %, not currently breastfeeding. Body mass index is 23.4 kg/m .  Patient Active Problem List   Diagnosis     Vulvar intraepithelial neoplasia (MAURA) grade 3     Family history of ischemic heart disease     Attention deficit hyperactivity disorder (ADHD), predominantly inattentive type       Wt Readings from Last 2 Encounters:   12/12/22 65.8 kg (145 lb)   12/12/22 65.9 kg (145 lb 3.2 oz)     BP Readings from Last 3 Encounters:   12/12/22 111/76   12/12/22 115/80   09/21/22 117/79         Current Outpatient Medications   Medication     amphetamine-dextroamphetamine (ADDERALL) 5 MG tablet     Ascorbic Acid (VITAMIN C PO)     clotrimazole (LOTRIMIN) 1 % external cream     fluconazole (DIFLUCAN) 150 MG tablet     hydrocortisone 2.5 % cream     imiquimod (ALDARA) 5 % external cream     lisdexamfetamine (VYVANSE) 40 MG capsule     Multiple Vitamins-Minerals (DAILY MULTIVITAMIN PO)     Multiple Vitamins-Minerals (ZINC PO)     propranolol (INDERAL) 10 MG tablet     VITAMIN D PO     No current facility-administered medications for this visit.       Social History     Tobacco Use     Smoking status: Never     Smokeless tobacco: Never   Substance Use Topics     Alcohol use: Yes     Alcohol/week: 1.0 standard drink     Types: 1 Standard drinks or equivalent per week     Comment: Occasional     Drug use: No       Health Maintenance Due   Topic Date Due     ADVANCE CARE PLANNING  Never done     HEPATITIS B IMMUNIZATION (1 of 3 - 3-dose series) Never done     HEPATITIS C SCREENING  Never done     Anal Microscopy (high resolution anoscopy)   11/17/2018     YEARLY PREVENTIVE VISIT  01/31/2019     ANAL PAP SMEAR  02/26/2019     PAP FOLLOW-UP  06/10/2022     HPV FOLLOW-UP  Never done     INFLUENZA VACCINE (1) 09/01/2022       Lab Results   Component Value Date    PAP LSIL 02/19/2021 December 12, 2022 4:28 PM

## 2022-12-12 NOTE — LETTER
2022       RE: Loreta Encarnacion  [address]  [facility] [address]     Dear Colleague,    Thank you for referring your patient, Loreta Encarnacion, to the [facility] [place] at [facility]. Please see a copy of my visit note below.      Colon and Rectal Surgery Clinic High Resolution Anoscopy Note    RE: Loreta Roth  : 1995  DEBBIE: 21    Loreta Roth is a 22 year old female with a history of MAURA 3 with ASCUS on anal Pap and AIN 1 in 2018 who presents today for high resolution anoscopy.     HPI: Loreta has been doing well. She gest some occasional blood in her stools with harder bowel movements. She had a colonoscopy with Dr. Arechiga on 3/19/18 with proctitis.   Last cervical biopsy in 221 with low grade dysplasia.  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    ASSESSMENT: Written, informed consent was obtained prior to procedure.  Prior to the start of the procedure and with procedural staff participation, I verbally confirmed the patient s identity using two indicators, relevant allergies, that the procedure was appropriate and matched the consent or emergent situation, and that the correct equipment/implants were available. Immediately prior to starting the procedure I conducted the Time Out with the procedural staff and re-confirmed the patient s name, procedure, and site/side. (The Joint Commission universal protocol was followed.)  Yes    Sedation (Moderate or Deep): None    Anal cytology was obtained with Dacron swab. Digital anal rectal exam was performed without any palpable lesions. Dilute acetic acid soak was completed for 2 minutes. Lubricant was used to insert the anoscope. Performed high resolution microscopy using the Proctostation. The dentate line was viewed in its entirety.  Continued proctitis in the rectum. No bright acetowhitening or  punctation.  The perianal area was inspected after acetic acid soak. The findings noted were no additional lesions, acetowhitening, or punctation.     The patient tolerated the procedures well.    PLAN: No evidence of high grade dysplasia. Repeat high resolution anoscopy in one year.  Patient's questions were answered to her stated satisfaction and she is in agreement with this plan.    For details of past medical history, surgical history, family history, medications, allergies, and review of systems, please see details below.    Medical history:  Past Medical History:   Diagnosis Date     ADHD (attention deficit hyperactivity disorder)      Constipation      Epigastric pain 01/10/2018    S/p w/u in [place] (neg US for gallstones, neg EGD for ulcers, improved w/ limited fatty foods)     Hemorrhoids      MAURA III (vulvar intraepithelial neoplasia III)     Spontaneous resolution, CO2 laser 5/30/17. Vulvoscopy neg 1/31/18. [] Vulvoscopy ~8/2018        Surgical history:  Past Surgical History:   Procedure Laterality Date     LASER CO2 VULVA N/A 5/30/2017    Procedure: LASER CO2 VULVA;  Wide Local CO2 Laser Of Vulva;  Surgeon: Yaquelin Rebolledo MD;  Location: [facility] OR       Family history:  Family History   Problem Relation Age of Onset     Depression Mother         maybe bipolar disorder?     Sleep Apnea Mother      Hypertension Father      Anxiety Disorder Father      Depression Father      Diabetes Father         Type 1     Obesity Father      Cerebrovascular Disease Father 35        think it started earlier in life     Kidney failure Father      Sleep Apnea Father      Mental Illness Maternal Grandmother      Substance Abuse Maternal Grandfather         Alcohol     Depression Maternal Grandfather      Hypertension Paternal Grandmother      Depression Paternal Grandmother      Hypertension Paternal Grandfather      Mental Illness Paternal Grandfather         schizophrenia     Obesity Paternal Grandfather      Heart  Disease Paternal Grandfather      Breast Cancer Paternal Aunt 60     Heart Disease Paternal Uncle 43     Mental Illness Paternal Half-Sister      Depression Paternal Half-Sister      Obesity Paternal Half-Sister      Obesity Cousin      Mental Illness Other         paranoid schizophrenia     Diabetes Other         Type 1     Ovarian Cancer No family hx of      Colon Cancer No family hx of        Medications:  Current Outpatient Medications   Medication Sig Dispense Refill     amphetamine-dextroamphetamine (ADDERALL) 5 MG tablet Take 1 tablet (5 mg) by mouth daily in the early afternoon. 30 tablet 0     Ascorbic Acid (VITAMIN C PO)        clotrimazole (LOTRIMIN) 1 % external cream Apply topically 2 times daily 30 g 0     fluconazole (DIFLUCAN) 150 MG tablet Take 1 tab. Repeat in 1 week. 2 tablet 0     hydrocortisone 2.5 % cream Apply topically 2 times daily as needed for itching 20 g 0     imiquimod (ALDARA) 5 % external cream Apply a small sized amount to warts or molluscum three times weekly at bedtime.   Wash off after 8 hours.   May use for up to 16 weeks. (Patient not taking: No sig reported) 12 packet 3     lisdexamfetamine (VYVANSE) 40 MG capsule Take 1 capsule (40 mg) by mouth every morning 90 capsule 0     Multiple Vitamins-Minerals (DAILY MULTIVITAMIN PO) Take by mouth daily        Multiple Vitamins-Minerals (ZINC PO)        propranolol (INDERAL) 10 MG tablet Take 1-2 tablets (10-20 mg) by mouth 3 times daily as needed (performance-related anxiety) 30 tablet 2     VITAMIN D PO        Allergies:  The patientis allergic to lactose and penicillins.    Social history:  Social History     Tobacco Use     Smoking status: Never     Smokeless tobacco: Never   Substance Use Topics     Alcohol use: Yes     Alcohol/week: 1.0 standard drink     Types: 1 Standard drinks or equivalent per week     Comment: Occasional     Marital status: single.    Review of Systems:  There are no exam notes on file for this visit.      This procedure was performed under a collaborative agreement with Dr. Sean Pickens MD, Chief of Colon and Rectal Surgery, [facility].      This note was created using speech recognition software and may contain unintended word substitutions.          Again, thank you for allowing me to participate in the care of your patient.      Sincerely,    LUCIAN Negron CNP

## 2022-12-13 ENCOUNTER — MYC REFILL (OUTPATIENT)
Dept: FAMILY MEDICINE | Facility: CLINIC | Age: 27
End: 2022-12-13

## 2022-12-13 DIAGNOSIS — F90.0 ATTENTION DEFICIT HYPERACTIVITY DISORDER (ADHD), PREDOMINANTLY INATTENTIVE TYPE: ICD-10-CM

## 2022-12-13 LAB
PATH REPORT.COMMENTS IMP SPEC: NORMAL
PATH REPORT.FINAL DX SPEC: NORMAL
PATH REPORT.GROSS SPEC: NORMAL
PATH REPORT.RELEVANT HX SPEC: NORMAL

## 2022-12-13 NOTE — TELEPHONE ENCOUNTER
Lisdexamfetamine (Vyvanse) 40 mg    Last Office Visit: 12/12/22  [facility] [place]: None  Medication last refilled: 5/17/22 #90 with 0 refill(s)     verified - last fill date: 5/25/22 #90    Routing refill request to provider for review/approval because:  Drug not on the G refill protocol     GOSIA Cueto, RN, CCM

## 2022-12-14 RX ORDER — LISDEXAMFETAMINE DIMESYLATE 40 MG/1
40 CAPSULE ORAL EVERY MORNING
Qty: 90 CAPSULE | Refills: 0 | Status: SHIPPED | OUTPATIENT
Start: 2022-12-14 | End: 2023-03-20

## 2022-12-15 ENCOUNTER — OFFICE VISIT (OUTPATIENT)
Dept: FAMILY MEDICINE | Facility: CLINIC | Age: 27
End: 2022-12-15
Payer: COMMERCIAL

## 2022-12-15 VITALS
HEART RATE: 85 BPM | BODY MASS INDEX: 24.17 KG/M2 | OXYGEN SATURATION: 100 % | TEMPERATURE: 98 F | DIASTOLIC BLOOD PRESSURE: 66 MMHG | RESPIRATION RATE: 16 BRPM | HEIGHT: 65 IN | SYSTOLIC BLOOD PRESSURE: 112 MMHG | WEIGHT: 145.08 LBS

## 2022-12-15 DIAGNOSIS — R07.9 ACUTE CHEST PAIN: ICD-10-CM

## 2022-12-15 DIAGNOSIS — K22.4 ESOPHAGEAL SPASM: Primary | ICD-10-CM

## 2022-12-15 DIAGNOSIS — Z23 NEED FOR VACCINATION: ICD-10-CM

## 2022-12-15 NOTE — NURSING NOTE
"27 year old  Chief Complaint   Patient presents with     Chest Pain     Occurred early morning 12/15, lasted 2 hours. Sharp pain that radiated to spine. Feeling fatigued today.       Blood pressure 112/66, pulse 85, temperature 98  F (36.7  C), temperature source Skin, resp. rate 16, height 1.651 m (5' 5\"), weight 65.8 kg (145 lb 1.3 oz), last menstrual period 12/04/2022, SpO2 100 %, not currently breastfeeding. Body mass index is 24.14 kg/m .  Patient Active Problem List   Diagnosis     Vulvar intraepithelial neoplasia (MAUAR) grade 3     Family history of ischemic heart disease     Attention deficit hyperactivity disorder (ADHD), predominantly inattentive type       Wt Readings from Last 2 Encounters:   12/15/22 65.8 kg (145 lb 1.3 oz)   12/12/22 65.8 kg (145 lb)     BP Readings from Last 3 Encounters:   12/15/22 112/66   12/12/22 111/76   12/12/22 115/80         Current Outpatient Medications   Medication     amphetamine-dextroamphetamine (ADDERALL) 5 MG tablet     Ascorbic Acid (VITAMIN C PO)     ciclopirox (LOPROX) 0.77 % cream     clotrimazole (LOTRIMIN) 1 % external cream     fluconazole (DIFLUCAN) 150 MG tablet     hydrocortisone 2.5 % cream     imiquimod (ALDARA) 5 % external cream     lisdexamfetamine (VYVANSE) 40 MG capsule     Multiple Vitamins-Minerals (DAILY MULTIVITAMIN PO)     Multiple Vitamins-Minerals (ZINC PO)     propranolol (INDERAL) 10 MG tablet     terbinafine (LAMISIL) 250 MG tablet     VITAMIN D PO     No current facility-administered medications for this visit.       Social History     Tobacco Use     Smoking status: Never     Smokeless tobacco: Never   Vaping Use     Vaping Use: Never used   Substance Use Topics     Alcohol use: Yes     Alcohol/week: 1.0 standard drink     Types: 1 Standard drinks or equivalent per week     Comment: Occasional     Drug use: No       Health Maintenance Due   Topic Date Due     ADVANCE CARE PLANNING  Never done     HEPATITIS B IMMUNIZATION (1 of 3 - 3-dose " series) Never done     HEPATITIS C SCREENING  Never done     Anal Microscopy (high resolution anoscopy)  11/17/2018     YEARLY PREVENTIVE VISIT  01/31/2019     PAP FOLLOW-UP  06/10/2022     HPV FOLLOW-UP  Never done     INFLUENZA VACCINE (1) 09/01/2022       Lab Results   Component Value Date    PAP LSIL 02/19/2021         December 15, 2022 2:34 PM

## 2022-12-15 NOTE — NURSING NOTE
"Injectable Influenza Immunization Documentation    1.  Has the patient received the information for the injectable influenza vaccine? YES     2. Is the patient 6 months of age or older? YES     3. Does the patient have any of the following contraindications?         Severe allergy to eggs? No     Severe allergic reaction to previous influenza vaccines? No   Severe allergy to latex? No       History of Guillain-[place] syndrome? No     Currently have a temperature greater than 100.4F? No        4.  Severely egg allergic patients should have flu vaccine eligibility assessed by an MD, RN, or pharmacist, and those who received flu vaccine should be observed for 15 min by an MD, RN, Pharmacist, Medical Technician, or member of clinic staff.\": YES    5. Latex-allergic patients should be given latex-free influenza vaccine Yes. Please reference the Vaccine latex table to determine if your clinic s product is latex-containing.       Vaccination given by Becky Atkinson LPN on 12/15/2022 at 3:26 PM          "

## 2022-12-15 NOTE — PROGRESS NOTES
"  Assessment & Plan   Acute chest pain  Esophageal spasm  Loreta reports onset of chest pain last night, felt like something stuck in her mid esophagus, that has gradually improved, but discomfort is still present. Symptoms do not seem consistent with a cardiac cause of chest pain in this otherwise healthy 27 yr old female. She does not have any alarm features. Discussed treating as GERD or esophageal spasm with a 2 wk course of PPI. Will follow-up after 2 week course to determine if symptoms have improved. If not improving consider endoscopy.     - omeprazole (PRILOSEC) 20 MG DR capsule; Take 1 capsule (20 mg) by mouth 2 times daily for 14 days    Worrisome signs and symptoms were discussed with Loreta and she was instructed to return to the clinic for concerning symptoms or to call with questions.     Ann Marie Ferro MD  [facility]    Subjective   Loreta is a 27 year old, presenting for the following health issues: Chest Pain (Occurred early morning 12/15, lasted 2 hours. Sharp pain that radiated to spine. Feeling fatigued today.)      HPI     Chest Pain  She woke up in the middle of the night last night with chest pain- woke up because there was something stuck in her esophagus, so that's what got her up. It was uncomfortable. Drank some water. Takes a magnesium supplement before bed so thought that might have gotten stuck. Felt a little better at first, but then got worse. Took some Tums, and that did make it feel better. Then it came back \"raging.\" Then it started radiating to her back, can pinpoint the spot in her back. Sharp pain. The pain was in middle of sternum - can still feel residual pain and pinpoint it. Hard to sleep afterwards so feeling tired today.   Pain lasted a couple of hours. Fell back asleep around 4am.   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 It's slowly going away throughout the day.   But still feels like she has something in her chest. " "  No further radiation to the back.   No SOB. No palpitations. No nausea or vomiting. No abdominal pain. No cough. No dizziness/lightheadeness. Did feel a bit warm but not sure if due to pain. Could still swallow.   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   [place].   Last night had chicken for dinner. Drank a Hasmukh. No caffeine late in the day. No heartburn. But did have a little bit of Mountain Dew at 4pm yesterday.     No new medication changes -- didn't start anti-fungal that was prescribed last week yet.    History:   Family history of heart disease: all of the men on her dad's side have heart disease at a young age with the exception of 2 uncles  Tobacco use: No  Previous similar symptoms: no - but she does note in 2017/2018 with \"horrific\" upper abdominal pain. Had normal EGD. Tried Carafate and at that helped.     Read online about esophageal spasms and that's really what it felt like.     Lots of stress recently. Wondering if that's contributing.     Precipitating factors:   Worse with exertion: No  Worse with deep breaths: very early this morning when she first woke up, but improved -- but still feel like it's something there  Nonsmoker. No DM, HTN, or HLD.       Objective    /66 (BP Location: Right arm, Patient Position: Sitting, Cuff Size: Adult Regular)   Pulse 85   Temp 98  F (36.7  C) (Skin)   Resp 16   Ht 1.651 m (5' 5\")   Wt 65.8 kg (145 lb 1.3 oz)   LMP 12/04/2022   SpO2 100%   BMI 24.14 kg/m    Body mass index is 24.14 kg/m .  Physical Exam   GENERAL: healthy, alert and no distress  NECK: no adenopathy, no asymmetry, masses, or scars and thyroid normal to palpation  RESP: lungs clear to auscultation - no rales, rhonchi or wheezes  CV: regular rate and rhythm, normal S1 S2, no S3 or S4, no murmur, " click or rub, no peripheral edema and peripheral pulses strong  ABDOMEN: soft, nontender, no hepatosplenomegaly, no masses and bowel sounds normal  MS: chest wall and thoracic spine are non-tender to palpation

## 2023-01-03 ENCOUNTER — TELEPHONE (OUTPATIENT)
Dept: FAMILY MEDICINE | Facility: CLINIC | Age: 28
End: 2023-01-03

## 2023-01-03 NOTE — TELEPHONE ENCOUNTER
Pt reports that she was attacked by a Bengali myrick on Sunday which bit her right thigh and took out about a quarter sized chunk of tissue out. She presented immediately to the ED for evaluation. Wound was cleaned and provider chose not to insert any stitches. Pt instructed to change dressing daily and monitor for s/s infection. Pt denies any s/s of infection, just pink margins, bruising, and mild swelling at wound bed. She is concerned because wound continues to bleed. Explained to pt that deep puncture wounds can continue to seep blood for several days following the initial injury and that closure was deferred as animal bites often carry high risk for infection. Advised pt to continue daily dressing changes at minimum and to be gentle with wound to promote scab development. Told pt to consider gentle compression and intermittent icing over wound, and OTC Tylenol or ibuprofen for pain control. Advised pt to call back PRN for further guidance. She confirms understanding and is agreeable to this plan.    Mitchell ELISE, RN  01/03/23 11:32 AM

## 2023-01-03 NOTE — LETTER
January 10, 2023      Loreta Encarnacion  [address]  [facility] [address]        To Whom It May Concern,      Loreta Encarnacion ( 1995) sustained an injury due to a dog bite on January 3, 2023. Due to the nature of this wound, it is my professional opinion that Loreta not travel while the wound is healing.  In addition to Loreta being very uncomfortable while walking, the activity prevents a clot from forming in her thigh, which prolongs the healing process and makes her vulnerable to infection.  If you have further questions regarding her care, please call our clinic at [phone].  Thank you.      Sincerely,        Celestine Heart MD

## 2023-01-09 NOTE — TELEPHONE ENCOUNTER
"Spoke with pt who is requesting a letter stating it is not in her best interest to be travelling right now due to her dog bite wound still being painful.   She has been traveling the past couple days and was very uncomfortable.  For insurance purposes, her employer requests a letter from her Doctor stating it is their professional opinion that she not travel.  Pt is an  and is supposed to be traveling to [place] in a few days.  She says her wound \"is just now clotting and I don't want to disturb it because when I walk, it contracts the muscle on my thigh and disturbs the scab.\"   She also can't wrap it comfortably because that causes increased irritation.    GOSIA Raman, RN  01/09/23, 3:29 PM    "

## 2023-01-09 NOTE — TELEPHONE ENCOUNTER
Who is calling? Patient   Reason for Call: Requesting a letter to not travel for her work, as her dog bite has not gotten better.

## 2023-01-10 NOTE — TELEPHONE ENCOUNTER
Created Travel letter for [place] and left at  for her  to .    GOSIA Raman, RN  01/10/23, 8:40 AM

## 2023-01-23 ENCOUNTER — HEALTH MAINTENANCE LETTER (OUTPATIENT)
Age: 28
End: 2023-01-23

## 2023-02-01 ENCOUNTER — OFFICE VISIT (OUTPATIENT)
Dept: OBGYN | Facility: CLINIC | Age: 28
End: 2023-02-01
Attending: MIDWIFE
Payer: COMMERCIAL

## 2023-02-01 VITALS
HEART RATE: 87 BPM | DIASTOLIC BLOOD PRESSURE: 62 MMHG | HEIGHT: 65 IN | BODY MASS INDEX: 24.56 KG/M2 | WEIGHT: 147.4 LBS | SYSTOLIC BLOOD PRESSURE: 108 MMHG

## 2023-02-01 DIAGNOSIS — Z31.69 ENCOUNTER FOR PRECONCEPTION CONSULTATION: Primary | ICD-10-CM

## 2023-02-01 DIAGNOSIS — Z12.4 SCREENING FOR MALIGNANT NEOPLASM OF CERVIX: ICD-10-CM

## 2023-02-01 DIAGNOSIS — R87.612 LOW GRADE SQUAMOUS INTRAEPITHELIAL LESION ON CYTOLOGIC SMEAR OF CERVIX (LGSIL): ICD-10-CM

## 2023-02-01 DIAGNOSIS — Z00.00 VISIT FOR PREVENTIVE HEALTH EXAMINATION: ICD-10-CM

## 2023-02-01 PROBLEM — R87.619 ABNORMAL PAP SMEAR OF CERVIX: Status: ACTIVE | Noted: 2023-02-01

## 2023-02-01 PROCEDURE — 87624 HPV HI-RISK TYP POOLED RSLT: CPT | Performed by: MIDWIFE

## 2023-02-01 PROCEDURE — G0124 SCREEN C/V THIN LAYER BY MD: HCPCS | Performed by: PATHOLOGY

## 2023-02-01 PROCEDURE — 87591 N.GONORRHOEAE DNA AMP PROB: CPT | Performed by: MIDWIFE

## 2023-02-01 PROCEDURE — 99212 OFFICE O/P EST SF 10 MIN: CPT | Mod: 25 | Performed by: MIDWIFE

## 2023-02-01 PROCEDURE — 87491 CHLMYD TRACH DNA AMP PROBE: CPT | Performed by: MIDWIFE

## 2023-02-01 PROCEDURE — G0145 SCR C/V CYTO,THINLAYER,RESCR: HCPCS | Performed by: MIDWIFE

## 2023-02-01 PROCEDURE — 99395 PREV VISIT EST AGE 18-39: CPT | Performed by: MIDWIFE

## 2023-02-01 ASSESSMENT — ENCOUNTER SYMPTOMS
SYNCOPE: 0
WHEEZING: 0
NAIL CHANGES: 0
BREAST PAIN: 0
HYPERTENSION: 0
HEARTBURN: 0
COUGH DISTURBING SLEEP: 0
TASTE DISTURBANCE: 0
BLOOD IN STOOL: 0
BLOATING: 0
JAUNDICE: 0
SORE THROAT: 0
NECK MASS: 0
FATIGUE: 1
EYE WATERING: 0
DYSURIA: 0
SNORES LOUDLY: 0
FEVER: 0
DECREASED CONCENTRATION: 1
EYE PAIN: 0
RECTAL BLEEDING: 0
CHILLS: 0
HALLUCINATIONS: 0
BRUISES/BLEEDS EASILY: 0
DIARRHEA: 0
SPEECH CHANGE: 0
NECK PAIN: 0
EYE REDNESS: 0
ORTHOPNEA: 0
DIZZINESS: 0
POSTURAL DYSPNEA: 0
DIFFICULTY URINATING: 0
PARALYSIS: 0
BREAST MASS: 0
SPUTUM PRODUCTION: 0
RESPIRATORY PAIN: 0
CLAUDICATION: 0
ALTERED TEMPERATURE REGULATION: 0
STIFFNESS: 0
MEMORY LOSS: 0
MUSCLE WEAKNESS: 0
NERVOUS/ANXIOUS: 1
LEG SWELLING: 0
VOMITING: 0
NAUSEA: 0
TROUBLE SWALLOWING: 0
DOUBLE VISION: 0
HOT FLASHES: 0
HOARSE VOICE: 0
EYE IRRITATION: 0
INSOMNIA: 1
BACK PAIN: 0
SEIZURES: 0
SHORTNESS OF BREATH: 0
LEG PAIN: 0
CONSTIPATION: 0
LOSS OF CONSCIOUSNESS: 0
DISTURBANCES IN COORDINATION: 0
HYPOTENSION: 0
SMELL DISTURBANCE: 0
DECREASED APPETITE: 0
TACHYCARDIA: 0
DECREASED LIBIDO: 0
WEIGHT GAIN: 0
SINUS CONGESTION: 0
MUSCLE CRAMPS: 0
INCREASED ENERGY: 0
SWOLLEN GLANDS: 0
WEAKNESS: 1
EXERCISE INTOLERANCE: 0
RECTAL PAIN: 0
PALPITATIONS: 0
SINUS PAIN: 0
TINGLING: 1
POLYDIPSIA: 0
SLEEP DISTURBANCES DUE TO BREATHING: 0
BOWEL INCONTINENCE: 0
POLYPHAGIA: 0
MYALGIAS: 0
POOR WOUND HEALING: 0
LIGHT-HEADEDNESS: 0
ABDOMINAL PAIN: 0
NIGHT SWEATS: 0
JOINT SWELLING: 0
ARTHRALGIAS: 0
PANIC: 0
SKIN CHANGES: 0
WEIGHT LOSS: 1
TREMORS: 0
HEADACHES: 0
HEMATURIA: 0
HEMOPTYSIS: 0
NUMBNESS: 1
DYSPNEA ON EXERTION: 0
DEPRESSION: 0
COUGH: 0
FLANK PAIN: 0

## 2023-02-01 ASSESSMENT — ANXIETY QUESTIONNAIRES
3. WORRYING TOO MUCH ABOUT DIFFERENT THINGS: SEVERAL DAYS
4. TROUBLE RELAXING: SEVERAL DAYS
GAD7 TOTAL SCORE: 4
IF YOU CHECKED OFF ANY PROBLEMS ON THIS QUESTIONNAIRE, HOW DIFFICULT HAVE THESE PROBLEMS MADE IT FOR YOU TO DO YOUR WORK, TAKE CARE OF THINGS AT HOME, OR GET ALONG WITH OTHER PEOPLE: SOMEWHAT DIFFICULT
8. IF YOU CHECKED OFF ANY PROBLEMS, HOW DIFFICULT HAVE THESE MADE IT FOR YOU TO DO YOUR WORK, TAKE CARE OF THINGS AT HOME, OR GET ALONG WITH OTHER PEOPLE?: SOMEWHAT DIFFICULT
7. FEELING AFRAID AS IF SOMETHING AWFUL MIGHT HAPPEN: NOT AT ALL
2. NOT BEING ABLE TO STOP OR CONTROL WORRYING: NOT AT ALL
5. BEING SO RESTLESS THAT IT IS HARD TO SIT STILL: NOT AT ALL
1. FEELING NERVOUS, ANXIOUS, OR ON EDGE: SEVERAL DAYS
7. FEELING AFRAID AS IF SOMETHING AWFUL MIGHT HAPPEN: NOT AT ALL
6. BECOMING EASILY ANNOYED OR IRRITABLE: SEVERAL DAYS
GAD7 TOTAL SCORE: 4

## 2023-02-01 NOTE — PROGRESS NOTES
Progress Note    SUBJECTIVE:  Loreta Encarnacion is an 27 year old, , who requests an Annual Preventive Exam.     Concerns today include:     Patient is currently trying to get pregnant this is first month of trying.     Patient had a syncopal episode with her last period. Worst cramping on day 1 attempted to go to the bathroom and was seeing spots.  Was able to get into bed and fainted also weak when attempting to get up after the incident.      2022 seen in urgent care for ruptured ovarian cyst. Worried about PCOS since her sister has dx.     LMP: 23     Lasted: 7 days    Cycle length: 28-30     Quality of bleeding: day 1: heavy day 2: heaviest (soak a super plus tampon in 5 hours) day 3: starts to decrease day 4: moderate day 5 trickle day 6: spotting     Started spotting 1 day before period, new this cycle.   Notices that when she feels extremely stressed her period starts early.     Partner: David getting doctorate in classical guitar     GYN HX:  LSIL pap and DOMINIK I colpo  - cotesting due today  MAURA III - every 6 month vulvoscopy. Last was 2022      Menstrual History:  Menstrual History 2022   LAST MENSTRUAL PERIOD 2022 -   Menarche Age - - 10   Period Cycle (Days) - - 28-30 days   Period Duration (Days) - - 7   Method of Contraception - - Condoms   Period Pattern - - Regular   Menstrual Flow - - Heavy   Menstrual Control - - Tampon   Menstrual Control Change Freq (Hours) - - 3 hours   Dysmenorrhea - - Severe   PMS Symptoms - - Mood Changes;Cramping;Nausea;Headache;Diarrhea;Throbbing   Reviewed Today - - -       Last    Lab Results   Component Value Date    PAP LSIL 2021     History of abnormal Pap smear: YES - updated in Problem List and Health Maintenance accordingly    Mammogram current: not applicable  Last Mammogram:   US Breast Left Limited 1-3 Quadrants    Result Date: 2022  Narrative: Examinations: US BREAST LEFT LIMITED 1-3  [facility], 1/19/2022 10:51 AM Comparisons: None History/family history: History of a quarter-sized erythematous skin lesion on the upper outer left breast first noticed one month ago, which has since resolved. Family history of an aunt with breast cancer diagnosed in her 60's. Findings:   Targeted ultrasound evaluation was performed by the technologist and radiologist. In the left breast at the 1:00 position, 8 cm only normal breast tissue is seen. There are no suspicious sonographic findings. On visual inspection, there is no discoloration of the overlying skin.        Last Colonoscopy: NA -routine screening  No results found for this or any previous visit.      HISTORY:  amphetamine-dextroamphetamine (ADDERALL) 5 MG tablet, Take 1 tablet (5 mg) by mouth daily in the early afternoon.  Ascorbic Acid (VITAMIN C PO),   ciclopirox (LOPROX) 0.77 % cream, Apply topically 2 times daily  lisdexamfetamine (VYVANSE) 40 MG capsule, Take 1 capsule (40 mg) by mouth every morning  Multiple Vitamins-Minerals (DAILY MULTIVITAMIN PO), Take by mouth daily   propranolol (INDERAL) 10 MG tablet, Take 1-2 tablets (10-20 mg) by mouth 3 times daily as needed (performance-related anxiety)  VITAMIN D PO,   clotrimazole (LOTRIMIN) 1 % external cream, Apply topically 2 times daily (Patient not taking: Reported on 2/1/2023)  fluconazole (DIFLUCAN) 150 MG tablet, Take 1 tab. Repeat in 1 week. (Patient not taking: Reported on 2/1/2023)  hydrocortisone 2.5 % cream, Apply topically 2 times daily as needed for itching (Patient not taking: Reported on 2/1/2023)  imiquimod (ALDARA) 5 % external cream, Apply a small sized amount to warts or molluscum three times weekly at bedtime.   Wash off after 8 hours.   May use for up to 16 weeks. (Patient not taking: Reported on 2/1/2023)    No current facility-administered medications on file prior to visit.    Allergies   Allergen Reactions     Lactose GI Disturbance     Penicillins Hives     As a child (7 or  8), amoxicillin     Immunization History   Administered Date(s) Administered     COVID-19 Vaccine 12+ (Pfizer) 2021, 2021     COVID-19 Vaccine 18+ (Moderna) 2022     COVID-19 Vaccine Bivalent Booster 18+ (Moderna) 10/13/2022     HPV 2016, 2016, 2017     Influenza Vaccine >6 months (Alfuria,Fluzone) 10/10/2019, 12/15/2022     TDAP Vaccine (Boostrix) 12/15/2016     Tdap (Adacel,Boostrix) 2023       OB History    Para Term  AB Living   0 0 0 0 0 0   SAB IAB Ectopic Multiple Live Births   0 0 0 0 0     Past Medical History:   Diagnosis Date     ADHD (attention deficit hyperactivity disorder)      Constipation      Epigastric pain 01/10/2018    S/p w/u in [place] (neg US for gallstones, neg EGD for ulcers, improved w/ limited fatty foods)     Hemorrhoids      MAURA III (vulvar intraepithelial neoplasia III)     Spontaneous resolution, CO2 laser 16. Vulvoscopy neg 18. [] Vulvoscopy ~2018      Past Surgical History:   Procedure Laterality Date     LASER CO2 VULVA N/A 2017    Procedure: LASER CO2 VULVA;  Wide Local CO2 Laser Of Vulva;  Surgeon: Yaquelin Rebolledo MD;  Location: [facility] OR     Family History   Problem Relation Age of Onset     Depression Mother         maybe bipolar disorder?     Sleep Apnea Mother      Hypertension Father      Anxiety Disorder Father      Depression Father      Diabetes Father         Type 1     Obesity Father      Cerebrovascular Disease Father 35        think it started earlier in life     Kidney failure Father      Sleep Apnea Father      Mental Illness Maternal Grandmother      Substance Abuse Maternal Grandfather         Alcohol     Depression Maternal Grandfather      Hypertension Paternal Grandmother      Depression Paternal Grandmother      Hypertension Paternal Grandfather      Mental Illness Paternal Grandfather         schizophrenia     Obesity Paternal Grandfather      Heart Disease Paternal Grandfather       Breast Cancer Paternal Aunt 60     Heart Disease Paternal Uncle 43     Mental Illness Paternal Half-Sister      Depression Paternal Half-Sister      Obesity Paternal Half-Sister      Obesity Cousin      Mental Illness Other         paranoid schizophrenia     Diabetes Other         Type 1     Ovarian Cancer No family hx of      Colon Cancer No family hx of      Social History     Socioeconomic History     Marital status:    Tobacco Use     Smoking status: Never     Smokeless tobacco: Never   Vaping Use     Vaping Use: Never used   Substance and Sexual Activity     Alcohol use: Yes     Alcohol/week: 1.0 standard drink     Types: 1 Standard drinks or equivalent per week     Comment: Occasional     Drug use: No     Sexual activity: Yes     Partners: Male     Birth control/protection: Pull-out method, Condom     Comment: Doesn't want hormonal contraception d/t effect on voice (she is a maldonado)   Social History Narrative    Previously full time job is as  but this disrupted by COVID - ending 06/2021    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 Negative for fever, chills, weight gain, decreased appetite, night sweats, height loss, post-operative complications, incisional pain, hallucinations, increased energy and confused.   HENT:  Negative for ear pain, hearing loss, tinnitus, nosebleeds, trouble swallowing, hoarse voice, mouth sores, sore throat, ear discharge, tooth pain, gum tenderness, taste disturbance, smell disturbance, hearing aid, bleeding gums, dry mouth, sinus pain, sinus congestion and neck mass.    Eyes:  Negative for double vision, pain, redness, eye pain, decreased vision, eye  watering, eye bulging, eye dryness, flashing lights, spots, floaters, strabismus, tunnel vision, jaundice and eye irritation.   Respiratory:   Negative for cough, hemoptysis, sputum production, shortness of breath, wheezing, sleep disturbances due to breathing, snores loudly, respiratory pain, dyspnea on exertion, cough disturbing sleep and postural dyspnea.    Cardiovascular:  Negative for chest pain, dyspnea on exertion, palpitations, orthopnea, claudication, leg swelling, fingers/toes turn blue, hypertension, hypotension, syncope, history of heart murmur, chest pain on exertion, chest pain at rest, pacemaker, few scattered varicosities, leg pain, sleep disturbances due to breathing, tachycardia, light-headedness, exercise intolerance and edema.   Gastrointestinal:  Negative for heartburn, nausea, vomiting, abdominal pain, diarrhea, constipation, blood in stool, melena, rectal pain, bloating, hemorrhoids, bowel incontinence, jaundice, rectal bleeding, coffee ground emesis and change in stool.   Genitourinary:  Positive for excessive menstruation. Negative for bladder incontinence, dysuria, urgency, hematuria, flank pain, vaginal discharge, difficulty urinating, genital sores, dyspareunia, decreased libido, nocturia, voiding less frequently, arousal difficulty, abnormal vaginal bleeding, menstrual changes, hot flashes, vaginal dryness and postmenopausal bleeding.   Musculoskeletal:  Negative for myalgias, back pain, joint swelling, arthralgias, stiffness, muscle cramps, neck pain, bone pain, muscle weakness and fracture.   Skin:  Negative for nail changes, itching, poor wound healing, rash, hair changes, skin changes, acne, warts, poor wound healing, scarring, flaky skin, Raynaud's phenomenon, sensitivity to sunlight and skin thickening.   Neurological:  Positive for tingling, weakness, numbness and difficulty walking. Negative for dizziness, tremors, speech change, seizures, loss of consciousness,  "light-headedness, headaches, disturbances in coordination, memory loss and paralysis.   Endo/Heme:  Negative for anemia, swollen glands and bruises/bleeds easily.   Psychiatric/Behavioral:  Positive for decreased concentration. Negative for depression, hallucinations, memory loss, mood swings and panic attacks.    Breast:  Negative for breast discharge, breast mass, breast pain and nipple retraction.   Endocrine:  Negative for altered temperature regulation, polyphagia, polydipsia, unwanted hair growth and change in facial hair.    ROS: 10 point ROS neg other than the symptoms noted above in the HPI.    PHQ-9 SCORE 2/4/2022   PHQ-9 Total Score 6     JANNA-7 SCORE 1/30/2018 2/4/2022 2/1/2023   Total Score 2 (minimal anxiety) - 4 (minimal anxiety)   Total Score 2 7 4       EXAM:  Blood pressure 108/62, pulse 87, height 1.651 m (5' 5\"), weight 66.9 kg (147 lb 6.4 oz), last menstrual period 01/23/2023, not currently breastfeeding. Body mass index is 24.53 kg/m .  General - pleasant female in no acute distress.  Skin - no suspicious lesions or rashes  EENT-  PERRLA, euthyroid with out palpable nodules  Neck - supple without lymphadenopathy.  Lungs - clear to auscultation bilaterally.  Heart - regular rate and rhythm without murmur.  Abdomen - soft, nontender, nondistended, no masses or organomegaly noted.  Musculoskeletal - no gross deformities.  Neurological - normal strength, sensation, and mental status.    Breast Exam:  Breast: Without visible skin changes. No dimpling or lesions seen.   Breasts supple, non-tender with palpation, no dominant mass, nodularity, or nipple discharge noted bilaterally. Axillary nodes negative.      Pelvic Exam:  EG/BUS: Normal genital architecture without lesions, erythema or abnormal secretions Bartholin's, Urethra, [place]'s normal   Urethral meatus: normal   Urethra: no masses, tenderness, or scarring   Bladder: no masses or tenderness   Vagina: moist, pink, rugae with creamy, white and " odorless  secretions  Cervix: Nulliparous, and pink, moist, closed, without lesion or CMT  Uterus: anteverted,   Adnexa: Within normal limits and No masses, nodularity, tenderness  Rectum:anus normal       ASSESSMENT/PLAN:     1. Low grade squamous intraepithelial lesion on cytologic smear of cervix (LGSIL) - 2/2021  Pap smear and pelvic exam completed today.   Per [facility] - if neg cotesting today repeat cotesting in 3 years    2. Visit for preventive health examination  Annual health screening/preconception visit. Pelvic exam completed with STI testing.   - Gonorrhea PCR  - Chlamydia PCR (Clinic Collect)    3. Screening for malignant neoplasm of cervix  Screening completed, not lesions noted upon exam.  Pap smear completed along with pelvic exam.   - Pap Smear Exam [] Do Not Remove  - Pelvic and Breast Exam Procedure []  - Pap imaged thin layer screen with HPV - recommended age 30 - 65 years (select HPV order below)    4. Encounter for preconception consultation  -Additional teaching done at this visit regarding preconception.  -Discussed taking prenatal vitamin with DHA and vitamin D   -Encouraged patient to actively try to conceive prior to medical intervention. Tools that could be used including cell phone nu's and ovulation kits.     No orders of the defined types were placed in this encounter.    Return to clinic in one year.  Follow-up as needed.    IMarlen, am serving as a scribe; to document services personally performed by  Jessie Barkley based on data collection and the provider's statements to me.     Marlen Sharma    The encounter was performed by me and scribed by the SN. The scribed note accurately reflects my personal services and decisions made by me.     LUCIAN Stauffer, MARTINEZ

## 2023-02-01 NOTE — PATIENT INSTRUCTIONS
Thank you for trusting us with your care!     If you need to contact us for questions about:  Symptoms, Scheduling & Medical Questions; Non-urgent (2-3 day response) [place] message, Urgent (needing response today) [phone] (if after 3:30pm next day response)   Prescriptions: Please call your Pharmacy   Billing: [facility] [phone] or [facility]:[phone]       PREVENTIVE HEALTH RECOMMENDATIONS:   Most women need a yearly breast and pelvic exam.    A PAP screen, a test done DURING a pelvic exam, is NO longer recommended yearly.    March 2013, screening guidelines recommended by ACOG for PAP screen are:    1) First pap at age 21.    2) Pap every 3 years until age 30.    3) After age 30, pap every 3 years or Pap with HR HPV screen every 5 years until age 65.  4) Women do NOT need a vaginal Pap screen after a hysterectomy (surgical removal of the uterus) when they have not had cancer.    Exceptions:  1) Yearly pap if HIV+ or immunosuppressed secondary to organ transplant  2) DOMINIK II-III continue routine screening for 20 years.    I encourage you continue looking for opportunities to choose a healthy lifestyle:       * Choose to eat a heart healthy diet. Check out the FOOD PLATE guidelines at: http://www.choosemyplate.gov/ for helpful hints on weight and cholesterol management.  Balance your caloric intake with exercise to maintain a BMI in the 22 to 26 range. For bone health: Eat calcium-rich foods like yogurt, broccoli or take chewable calcium pills (500 to 600 mg) twice a day with food.       * Exercise for at least an average of 30 minutes a day, 5 days of the week. This will help you control your weight, release stress, and help prevent disease.      * Take a Vitamin D3 supplement daily fall through spring and during summer unless you [id number]' full body sun exposure to skin without sunscreen.      * DO wear sunscreen to prevent skin cancer after the first 15-30 minutes.      * Identify stressors in your  life, find ways to release the stress, and, make time for yourself. PLEASE ask for help if mood changes last longer than two weeks.     * Limit alcohol to one drink per day.  No smoking.  Avoid second hand smoke. If you smoke, ask for help to stop.       *  If you are in a sexual relationship, talk with your partner about possible infection risks and take action to protect yourself from exposure to a sexual infection.    Please request an infection screen for STIs (sexually transmitted infections) if you are less than age 26 OR believe that you may be at risk.     Get a flu shot each year. Get a tetanus shot every 10 years. EVERYONE needs a pertussis (Whooping cough) booster.    See your dentist twice a year for an exam and preventive care cleaning.     Consider the following screen tests:    1) cholesterol test every 5 years.     2) yearly mammogram after age 40 unless you have identified risks.    3) colonoscopy every 10 years after age 50 unless you have identified risks.    4) diabetes blood test screening if you are at risk for diabetes.      Additional information that you may also find helpful:  The Internet now gives us access to LOTS of information -- some of it helpful, research documented and also plenty of harmful, anecdotal information that may not pertain to your situtaion. Consider visiting the following websites for accurate health information:    www.vitamindcouncil.org/ : Info and ongoing research re Vitamin D    www.fairview.org : Up to date and easily searchable information on multiple topics.    www.medlineplus.gov : medication info, interactive tutorials, watch real surgeries online    www.cdc.gov : public health info, travel advisories, epidemics (H1N1)    www.chuyita/std.org: current research re diagnosis, treatment and prevention of sexually contacted infections.    www.health.state.[place] : [place] dept of heatlh, public health issues in [place], N1N1    www.familydoctor.org : good info from the Academy  of Family Physicians

## 2023-02-01 NOTE — LETTER
2023       RE: Loreta Encarnacion  [address]  [facility] [address]     Dear Colleague,    Thank you for referring your patient, Loreta Encarnacion, to the [facility] [place] [facility]. Please see a copy of my visit note below.    Progress Note    SUBJECTIVE:  Loreta Encarnacion is an 27 year old, , who requests an Annual Preventive Exam.     Concerns today include:     Patient is currently trying to get pregnant this is first month of trying.     Patient had a syncopal episode with her last period. Worst cramping on day 1 attempted to go to the bathroom and was seeing spots.  Was able to get into bed and fainted also weak when attempting to get up after the incident.      2022 seen in urgent care for ruptured ovarian cyst. Worried about PCOS since her sister has dx.     LMP: 23     Lasted: 7 days    Cycle length: 28-30     Quality of bleeding: day 1: heavy day 2: heaviest (soak a super plus tampon in 5 hours) day 3: starts to decrease day 4: moderate day 5 trickle day 6: spotting     Started spotting 1 day before period, new this cycle.   Notices that when she feels extremely stressed her period starts early.     Partner: David getting doctorate in classical guitar     GYN HX:  LSIL pap and DOMINIK I colpo  - cotesting due today  MAURA III - every 6 month vulvoscopy. Last was 2022      Menstrual History:  Menstrual History 2022   LAST MENSTRUAL PERIOD 2022 -   Menarche Age - - 10   Period Cycle (Days) - - 28-30 days   Period Duration (Days) - - 7   Method of Contraception - - Condoms   Period Pattern - - Regular   Menstrual Flow - - Heavy   Menstrual Control - - Tampon   Menstrual Control Change Freq (Hours) - - 3 hours   Dysmenorrhea - - Severe   PMS Symptoms - - Mood Changes;Cramping;Nausea;Headache;Diarrhea;Throbbing   Reviewed Today - - -       Last    Lab  Results   Component Value Date    PAP LSIL 02/19/2021     History of abnormal Pap smear: YES - updated in Problem List and Health Maintenance accordingly    Mammogram current: not applicable  Last Mammogram:   US Breast Left Limited 1-3 Quadrants    Result Date: 1/19/2022  Narrative: Examinations: US BREAST LEFT LIMITED 1-3 QUADRANTS, 1/19/2022 10:51 AM Comparisons: None History/family history: History of a quarter-sized erythematous skin lesion on the upper outer left breast first noticed one month ago, which has since resolved. Family history of an aunt with breast cancer diagnosed in her 60's. Findings:   Targeted ultrasound evaluation was performed by the technologist and radiologist. In the left breast at the 1:00 position, 8 cm only normal breast tissue is seen. There are no suspicious sonographic findings. On visual inspection, there is no discoloration of the overlying skin.        Last Colonoscopy: NA -routine screening  No results found for this or any previous visit.      HISTORY:  amphetamine-dextroamphetamine (ADDERALL) 5 MG tablet, Take 1 tablet (5 mg) by mouth daily in the early afternoon.  Ascorbic Acid (VITAMIN C PO),   ciclopirox (LOPROX) 0.77 % cream, Apply topically 2 times daily  lisdexamfetamine (VYVANSE) 40 MG capsule, Take 1 capsule (40 mg) by mouth every morning  Multiple Vitamins-Minerals (DAILY MULTIVITAMIN PO), Take by mouth daily   propranolol (INDERAL) 10 MG tablet, Take 1-2 tablets (10-20 mg) by mouth 3 times daily as needed (performance-related anxiety)  VITAMIN D PO,   clotrimazole (LOTRIMIN) 1 % external cream, Apply topically 2 times daily (Patient not taking: Reported on 2/1/2023)  fluconazole (DIFLUCAN) 150 MG tablet, Take 1 tab. Repeat in 1 week. (Patient not taking: Reported on 2/1/2023)  hydrocortisone 2.5 % cream, Apply topically 2 times daily as needed for itching (Patient not taking: Reported on 2/1/2023)  imiquimod (ALDARA) 5 % external cream, Apply a small sized amount to  warts or molluscum three times weekly at bedtime.   Wash off after 8 hours.   May use for up to 16 weeks. (Patient not taking: Reported on 2023)    No current facility-administered medications on file prior to visit.    Allergies   Allergen Reactions     Lactose GI Disturbance     Penicillins Hives     As a child (7 or 8), amoxicillin     Immunization History   Administered Date(s) Administered     COVID-19 Vaccine 12+ (Pfizer) 2021, 2021     COVID-19 Vaccine 18+ (Moderna) 2022     COVID-19 Vaccine Bivalent Booster 18+ (Moderna) 10/13/2022     HPV 2016, 2016, 2017     Influenza Vaccine >6 months (Alfuria,Fluzone) 10/10/2019, 12/15/2022     TDAP Vaccine (Boostrix) 12/15/2016     Tdap (Adacel,Boostrix) 2023       OB History    Para Term  AB Living   0 0 0 0 0 0   SAB IAB Ectopic Multiple Live Births   0 0 0 0 0     Past Medical History:   Diagnosis Date     ADHD (attention deficit hyperactivity disorder)      Constipation      Epigastric pain 01/10/2018    S/p w/u in [place] (neg US for gallstones, neg EGD for ulcers, improved w/ limited fatty foods)     Hemorrhoids      MAURA III (vulvar intraepithelial neoplasia III)     Spontaneous resolution, CO2 laser 16. Vulvoscopy neg 18. [] Vulvoscopy ~2018      Past Surgical History:   Procedure Laterality Date     LASER CO2 VULVA N/A 2017    Procedure: LASER CO2 VULVA;  Wide Local CO2 Laser Of Vulva;  Surgeon: Yaquelin Rebolledo MD;  Location:  OR     Family History   Problem Relation Age of Onset     Depression Mother         maybe bipolar disorder?     Sleep Apnea Mother      Hypertension Father      Anxiety Disorder Father      Depression Father      Diabetes Father         Type 1     Obesity Father      Cerebrovascular Disease Father 35        think it started earlier in life     Kidney failure Father      Sleep Apnea Father      Mental Illness Maternal Grandmother      Substance Abuse  Maternal Grandfather         Alcohol     Depression Maternal Grandfather      Hypertension Paternal Grandmother      Depression Paternal Grandmother      Hypertension Paternal Grandfather      Mental Illness Paternal Grandfather         schizophrenia     Obesity Paternal Grandfather      Heart Disease Paternal Grandfather      Breast Cancer Paternal Aunt 60     Heart Disease Paternal Uncle 43     Mental Illness Paternal Half-Sister      Depression Paternal Half-Sister      Obesity Paternal Half-Sister      Obesity Cousin      Mental Illness Other         paranoid schizophrenia     Diabetes Other         Type 1     Ovarian Cancer No family hx of      Colon Cancer No family hx of      Social History     Socioeconomic History     Marital status:    Tobacco Use     Smoking status: Never     Smokeless tobacco: Never   Vaping Use     Vaping Use: Never used   Substance and Sexual Activity     Alcohol use: Yes     Alcohol/week: 1.0 standard drink     Types: 1 Standard drinks or equivalent per week     Comment: Occasional     Drug use: No     Sexual activity: Yes     Partners: Male     Birth control/protection: Pull-out method, Condom     Comment: Doesn't want hormonal contraception d/t effect on voice (she is a maldonado)   Social History Narrative    Previously full time job is as  but this disrupted by COVID - ending 06/2021    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 Negative for fever, chills, weight gain, decreased appetite, night sweats, height loss, post-operative complications, incisional pain, hallucinations, increased energy and confused.   HENT:  Negative for ear pain, hearing loss,  tinnitus, nosebleeds, trouble swallowing, hoarse voice, mouth sores, sore throat, ear discharge, tooth pain, gum tenderness, taste disturbance, smell disturbance, hearing aid, bleeding gums, dry mouth, sinus pain, sinus congestion and neck mass.    Eyes:  Negative for double vision, pain, redness, eye pain, decreased vision, eye watering, eye bulging, eye dryness, flashing lights, spots, floaters, strabismus, tunnel vision, jaundice and eye irritation.   Respiratory:   Negative for cough, hemoptysis, sputum production, shortness of breath, wheezing, sleep disturbances due to breathing, snores loudly, respiratory pain, dyspnea on exertion, cough disturbing sleep and postural dyspnea.    Cardiovascular:  Negative for chest pain, dyspnea on exertion, palpitations, orthopnea, claudication, leg swelling, fingers/toes turn blue, hypertension, hypotension, syncope, history of heart murmur, chest pain on exertion, chest pain at rest, pacemaker, few scattered varicosities, leg pain, sleep disturbances due to breathing, tachycardia, light-headedness, exercise intolerance and edema.   Gastrointestinal:  Negative for heartburn, nausea, vomiting, abdominal pain, diarrhea, constipation, blood in stool, melena, rectal pain, bloating, hemorrhoids, bowel incontinence, jaundice, rectal bleeding, coffee ground emesis and change in stool.   Genitourinary:  Positive for excessive menstruation. Negative for bladder incontinence, dysuria, urgency, hematuria, flank pain, vaginal discharge, difficulty urinating, genital sores, dyspareunia, decreased libido, nocturia, voiding less frequently, arousal difficulty, abnormal vaginal bleeding, menstrual changes, hot flashes, vaginal dryness and postmenopausal bleeding.   Musculoskeletal:  Negative for myalgias, back pain, joint swelling, arthralgias, stiffness, muscle cramps, neck pain, bone pain, muscle weakness and fracture.   Skin:  Negative for nail changes, itching, poor wound healing, rash,  "hair changes, skin changes, acne, warts, poor wound healing, scarring, flaky skin, Raynaud's phenomenon, sensitivity to sunlight and skin thickening.   Neurological:  Positive for tingling, weakness, numbness and difficulty walking. Negative for dizziness, tremors, speech change, seizures, loss of consciousness, light-headedness, headaches, disturbances in coordination, memory loss and paralysis.   Endo/Heme:  Negative for anemia, swollen glands and bruises/bleeds easily.   Psychiatric/Behavioral:  Positive for decreased concentration. Negative for depression, hallucinations, memory loss, mood swings and panic attacks.    Breast:  Negative for breast discharge, breast mass, breast pain and nipple retraction.   Endocrine:  Negative for altered temperature regulation, polyphagia, polydipsia, unwanted hair growth and change in facial hair.    ROS: 10 point ROS neg other than the symptoms noted above in the HPI.    PHQ-9 SCORE 2/4/2022   PHQ-9 Total Score 6     JANNA-7 SCORE 1/30/2018 2/4/2022 2/1/2023   Total Score 2 (minimal anxiety) - 4 (minimal anxiety)   Total Score 2 7 4       EXAM:  Blood pressure 108/62, pulse 87, height 1.651 m (5' 5\"), weight 66.9 kg (147 lb 6.4 oz), last menstrual period 01/23/2023, not currently breastfeeding. Body mass index is 24.53 kg/m .  General - pleasant female in no acute distress.  Skin - no suspicious lesions or rashes  EENT-  PERRLA, euthyroid with out palpable nodules  Neck - supple without lymphadenopathy.  Lungs - clear to auscultation bilaterally.  Heart - regular rate and rhythm without murmur.  Abdomen - soft, nontender, nondistended, no masses or organomegaly noted.  Musculoskeletal - no gross deformities.  Neurological - normal strength, sensation, and mental status.    Breast Exam:  Breast: Without visible skin changes. No dimpling or lesions seen.   Breasts supple, non-tender with palpation, no dominant mass, nodularity, or nipple discharge noted bilaterally. Axillary " nodes negative.      Pelvic Exam:  EG/BUS: Normal genital architecture without lesions, erythema or abnormal secretions Bartholin's, Urethra, Nightmute's normal   Urethral meatus: normal   Urethra: no masses, tenderness, or scarring   Bladder: no masses or tenderness   Vagina: moist, pink, rugae with creamy, white and odorless  secretions  Cervix: Nulliparous, and pink, moist, closed, without lesion or CMT  Uterus: anteverted,   Adnexa: Within normal limits and No masses, nodularity, tenderness  Rectum:anus normal       ASSESSMENT/PLAN:     1. Low grade squamous intraepithelial lesion on cytologic smear of cervix (LGSIL) - 2/2021  Pap smear and pelvic exam completed today.   Per [facility] - if neg cotesting today repeat cotesting in 3 years    2. Visit for preventive health examination  Annual health screening/preconception visit. Pelvic exam completed with STI testing.   - Gonorrhea PCR  - Chlamydia PCR (Clinic Collect)    3. Screening for malignant neoplasm of cervix  Screening completed, not lesions noted upon exam.  Pap smear completed along with pelvic exam.   - Pap Smear Exam [] Do Not Remove  - Pelvic and Breast Exam Procedure []  - Pap imaged thin layer screen with HPV - recommended age 30 - 65 years (select HPV order below)    4. Encounter for preconception consultation  -Additional teaching done at this visit regarding preconception.  -Discussed taking prenatal vitamin with DHA and vitamin D   -Encouraged patient to actively try to conceive prior to medical intervention. Tools that could be used including cell phone nu's and ovulation kits.     No orders of the defined types were placed in this encounter.    Return to clinic in one year.  Follow-up as needed.    Marlen RICH, am serving as a scribe; to document services personally performed by  Jessie Barkley based on data collection and the provider's statements to me.     Marlen Sharma    The encounter was performed by me and scribed by  the SNM. The scribed note accurately reflects my personal services and decisions made by me.     LUCIAN Stauffer, MARTINEZ

## 2023-02-02 LAB
C TRACH DNA SPEC QL NAA+PROBE: NEGATIVE
N GONORRHOEA DNA SPEC QL NAA+PROBE: NEGATIVE

## 2023-02-03 LAB
BKR LAB AP GYN ADEQUACY: ABNORMAL
BKR LAB AP GYN INTERPRETATION: ABNORMAL
BKR LAB AP HPV REFLEX: ABNORMAL
BKR LAB AP PREVIOUS ABNL DX: ABNORMAL
BKR LAB AP PREVIOUS ABNORMAL: ABNORMAL
PATH REPORT.COMMENTS IMP SPEC: ABNORMAL
PATH REPORT.COMMENTS IMP SPEC: ABNORMAL
PATH REPORT.RELEVANT HX SPEC: ABNORMAL

## 2023-02-07 LAB
HUMAN PAPILLOMA VIRUS 16 DNA: NEGATIVE
HUMAN PAPILLOMA VIRUS 18 DNA: NEGATIVE
HUMAN PAPILLOMA VIRUS FINAL DIAGNOSIS: NORMAL
HUMAN PAPILLOMA VIRUS OTHER HR: NEGATIVE

## 2023-02-10 ENCOUNTER — PATIENT OUTREACH (OUTPATIENT)
Dept: OBGYN | Facility: CLINIC | Age: 28
End: 2023-02-10
Payer: COMMERCIAL

## 2023-03-20 ENCOUNTER — MYC MEDICAL ADVICE (OUTPATIENT)
Dept: FAMILY MEDICINE | Facility: CLINIC | Age: 28
End: 2023-03-20

## 2023-03-20 DIAGNOSIS — F90.0 ATTENTION DEFICIT HYPERACTIVITY DISORDER (ADHD), PREDOMINANTLY INATTENTIVE TYPE: ICD-10-CM

## 2023-03-20 RX ORDER — LISDEXAMFETAMINE DIMESYLATE 40 MG/1
40 CAPSULE ORAL EVERY MORNING
Qty: 90 CAPSULE | Refills: 0 | Status: SHIPPED | OUTPATIENT
Start: 2023-03-20 | End: 2023-04-06

## 2023-03-20 NOTE — TELEPHONE ENCOUNTER
Medication requested: lisdexamfetamine (VYVANSE) 40 MG capsule  Last office visit: 12/15/22  [facility] appointments: none  Medication last refilled: 12/14/22; 90 + 0 refills, last sold 12/21/22 per   Last qualifying labs: N/A    Routing refill request to provider for review/approval because:  Drug not on the [facility] refill protocol     Mitchell ELISE, RN  03/20/23 11:26 AM

## 2023-03-22 ENCOUNTER — TELEPHONE (OUTPATIENT)
Dept: FAMILY MEDICINE | Facility: CLINIC | Age: 28
End: 2023-03-22

## 2023-03-22 NOTE — TELEPHONE ENCOUNTER
Prior Authorization Retail Medication Request    Medication/Dose: lisdexamfetamine (VYVANSE) 40 MG capsule  ICD code (if different than what is on RX):    Previously Tried and Failed:    Rationale:      Insurance Name:    Insurance ID:        Pharmacy Information (if different than what is on RX)  Name:    Phone:     Mitchell ELISE RN  03/22/23 2:37 PM

## 2023-03-25 NOTE — TELEPHONE ENCOUNTER
Prior Authorization Not Needed per Insurance    Medication: lisdexamfetamine (VYVANSE) 40 MG capsule--NO PA NEEDED  Insurance Company: [employer] - Phone [phone] Fax [phone]  Expected CoPay:      Pharmacy Filling the Rx: [facility] #[id number] - [facility] [place] - [address] AT [facility] [address]  Pharmacy Notified: Yes  Patient Notified: Yes **Instructed pharmacy to notify patient when script is ready to /ship.**

## 2023-03-31 ENCOUNTER — TELEPHONE (OUTPATIENT)
Dept: FAMILY MEDICINE | Facility: CLINIC | Age: 28
End: 2023-03-31

## 2023-03-31 NOTE — TELEPHONE ENCOUNTER
Central Prior Authorization Team   Phone: [phone]      Prior Authorization Not Needed per Insurance    Medication: lisdexamfetamine (VYVANSE) 40 MG capsule - PA NOT NEEDED  Insurance Company: [employer] [place] - Phone [phone] Fax [phone]  Expected CoPay:      Pharmacy Filling the Rx: [facility] #[id number] - [place], [place] - [address]  Pharmacy Notified: Yes  Patient Notified: Yes    I called the filling pharmacy and they were processing it under the wrong the insurance - I did provide the correct insurance information and they were able to get the medication to process. I was informed that the patient could pick it up Monday.    I did reach out to the patient afterward and explain what happened. She is happy with the result of being able to  Monday.

## 2023-04-06 DIAGNOSIS — F90.0 ATTENTION DEFICIT HYPERACTIVITY DISORDER (ADHD), PREDOMINANTLY INATTENTIVE TYPE: ICD-10-CM

## 2023-04-06 RX ORDER — LISDEXAMFETAMINE DIMESYLATE 40 MG/1
40 CAPSULE ORAL EVERY MORNING
Qty: 90 CAPSULE | Refills: 0 | Status: SHIPPED | OUTPATIENT
Start: 2023-04-06 | End: 2023-07-24

## 2023-04-06 NOTE — TELEPHONE ENCOUNTER
Pt has not picked up order sent on 3/20/23,  confirms this. Will route to Dr. Velasquez for review.    Mitchell ELISE, RN  04/06/23 11:17 AM

## 2023-05-15 NOTE — PROGRESS NOTES
FOLLOW UP  May 17, 2023     Loreta Encarnacion is a 27 year old woman who presents for family history of breast cancer.      HPI:     Family history of paternal aunt with breast cancer. She does meet NCCN guidelines for high risk screening based on CHAZ/Keishaer-Chadck: 22.4% lifetime risk.     She was seen in January, 2022 for 1 month of improving area of left breast erythema. Imaging at that time with mammogram and ultrasound was negative.     She was seen 4/20/22 for follow up. At that time her symptoms had resolved.     She denies any breast mass, skin changes, nipple inversion or nipple discharge.      BREAST-SPECIFIC HISTORY:     Previous breast imaging: yes  - 1/19/22 BI-RADS 1     Prior breast biopsies/surgeries: No     Prior history of breast cancer or DCIS: No  Prior radiation history: No  Self breast exams: Yes  Breast density:      GYN HISTORY:  G0  Age at menarche: 10  Menopausal: premonpausal.   Menopausal hormone replacement therapy: No      RISK ASSESSMENT: > 20% lifetime risk   Sarahi:n/a  CHAZ/Tyrer-Cuzick: 22.4% lifetime risk     FAMILY HISTORY:  Breast ca: Yes   - paternal aunt, 50's  Ovarian ca: No  Pancreatic ca: No  Prostate: No  Gastric ca: No  Melanoma: No  Colon ca: No  Other cancer: No  Other genetic, testing, syndromes, or clotting disorders: no     PAST MEDICAL HISTORY  Past Medical History:   Diagnosis Date     ADHD (attention deficit hyperactivity disorder)      Constipation      Epigastric pain 01/10/2018    S/p w/u in [place] (neg US for gallstones, neg EGD for ulcers, improved w/ limited fatty foods)     Hemorrhoids      MAURA III (vulvar intraepithelial neoplasia III)     Spontaneous resolution, CO2 laser 5/30/17. Vulvoscopy neg 1/31/18. [] Vulvoscopy ~8/2018      PAST SURGICAL HISTORY   Past Surgical History:   Procedure Laterality Date     LASER CO2 VULVA N/A 5/30/2017    Procedure: LASER CO2 VULVA;  Wide Local CO2 Laser Of Vulva;  Surgeon: Yaquelin Rebolledo MD;  Location:  OR      MEDICATIONS  Current Outpatient Medications   Medication Sig Dispense Refill     amphetamine-dextroamphetamine (ADDERALL) 5 MG tablet Take 1 tablet (5 mg) by mouth daily in the early afternoon. 30 tablet 0     Ascorbic Acid (VITAMIN C PO)        lisdexamfetamine (VYVANSE) 40 MG capsule Take 1 capsule (40 mg) by mouth every morning 90 capsule 0     Multiple Vitamins-Minerals (DAILY MULTIVITAMIN PO) Take by mouth daily        VITAMIN D PO        ciclopirox (LOPROX) 0.77 % cream Apply topically 2 times daily (Patient not taking: Reported on 5/17/2023) 30 g 1     clotrimazole (LOTRIMIN) 1 % external cream Apply topically 2 times daily (Patient not taking: Reported on 2/1/2023) 30 g 0     hydrocortisone 2.5 % cream Apply topically 2 times daily as needed for itching (Patient not taking: Reported on 2/1/2023) 20 g 0     imiquimod (ALDARA) 5 % external cream Apply a small sized amount to warts or molluscum three times weekly at bedtime.   Wash off after 8 hours.   May use for up to 16 weeks. (Patient not taking: Reported on 2/1/2023) 12 packet 3     propranolol (INDERAL) 10 MG tablet Take 1-2 tablets (10-20 mg) by mouth 3 times daily as needed (performance-related anxiety) (Patient not taking: Reported on 5/17/2023) 30 tablet 2     ALLERGIES  Allergies   Allergen Reactions     Lactose GI Disturbance     Penicillins Hives     As a child (7 or 8), amoxicillin     SOCIAL HISTORY:  Smokes: No  EtOH: Yes, rarely once per month  Exercise: active    Is an . Previously worked at [employer] and reports heavy chemical exposure. She is planning to try to get pregnant in the fall.     ROS:  Change in vision No  Headaches: no  Respiratory: No shortness of breath, dyspnea on exertion, cough, or hemoptysis   Cardiovascular: negative   Gastrointestinal: negative Abdominal pain: no  Breast: negative  Musculoskeletal: negative Joint pain No Back pain: no  Psychiatric: negative  Hematologic/Lymphatic/Immunologic:  negative  Endocrine: negative    EXAM  /76   Pulse 75   Temp 98.4  F (36.9  C) (Oral)   Wt 67.4 kg (148 lb 8 oz)   SpO2 100%   BMI 24.71 kg/m     PHYSICAL EXAM  Respiratory: breathing non labored.   Breasts: Examination was done in both the upright and supine positions.  Breasts are symmetrical . No dominant fixed or suspicious masses noted. No skin or nipple changes. No nipple discharge.   Nodular breast tissue palpated throughout.   No clavicular, cervical, or axillary lymphadenopathy.     ASSESSMENT/PLAN:    Loreta Encarnacion is a 27 year old woman with family history of breast cancer. She meets NCCN guidelines for high risk screening based on her family history of breast cancer.       1) Family history of breast cancer  Discussed she meets NCCN guidelines for high risk screening based on family history with lifetime risk for breast cancer of >20%. Screening recommendations based on NCCN guidelines. Be familiar with your breast and promptly report any changes to your health care provider. Clinical encounter every 6-12 months. Annual mammogram with keagan starting 10 years prior to the youngest family member but not less than age 30 or 40 which ever come first. Annual breast MRI to begin 10 years prior to the youngest family member diagnosed but not less than 25 years or 40 which ever come first. Counseling was provided with available strategies including lifestyle modifications and risk reducing intervention.   - Follow up in 1 year for clinic visit with risk assessment and breast exam.   - Screening mammograms to begin at age 40.   - Consider breast MRI at age 40     2) Lifestyle Modifications were provided. - Maintain your best healthy weight. Higher body fat and adult weight gain is associated with increased risk for breast cancer. This increase in risk has been attributed to increase in circulating endogenous estrogen levels from fat tissue.   - Alcohol can raise estrogen. Alcohol consumption,  even at moderate levels (1-2 drinks per day), increases breast cancer risk and are best avoided. If you choose to drink alcohol limit alcohol consumption to less than 1 drink per day. (1 ounce of liquor, 6 ounces of wine, or 8 ounces of beer).  - Be active daily and void being sedentary.     Hannah Prabhakar PA-C    20 minutes spent on the date of the encounter doing chart review, review of test results, interpretation of tests, patient visit and documentation.

## 2023-05-17 ENCOUNTER — ONCOLOGY VISIT (OUTPATIENT)
Dept: SURGERY | Facility: CLINIC | Age: 28
End: 2023-05-17
Attending: PHYSICIAN ASSISTANT
Payer: COMMERCIAL

## 2023-05-17 VITALS
TEMPERATURE: 98.4 F | WEIGHT: 148.5 LBS | SYSTOLIC BLOOD PRESSURE: 121 MMHG | DIASTOLIC BLOOD PRESSURE: 76 MMHG | OXYGEN SATURATION: 100 % | BODY MASS INDEX: 24.71 KG/M2 | HEART RATE: 75 BPM

## 2023-05-17 DIAGNOSIS — Z91.89 AT HIGH RISK FOR BREAST CANCER: ICD-10-CM

## 2023-05-17 DIAGNOSIS — Z80.3 FAMILY HISTORY OF BREAST CANCER: Primary | ICD-10-CM

## 2023-05-17 PROCEDURE — 99211 OFF/OP EST MAY X REQ PHY/QHP: CPT | Performed by: PHYSICIAN ASSISTANT

## 2023-05-17 PROCEDURE — 99213 OFFICE O/P EST LOW 20 MIN: CPT | Performed by: PHYSICIAN ASSISTANT

## 2023-05-17 ASSESSMENT — PAIN SCALES - GENERAL: PAINLEVEL: NO PAIN (0)

## 2023-05-17 NOTE — NURSING NOTE
"Oncology Rooming Note    May 17, 2023 9:14 AM   Loreta Encarnacion is a 27 year old female who presents for:    Chief Complaint   Patient presents with     Oncology Clinic Visit     RTN: Breast tenderness      Initial Vitals: /76   Pulse 75   Temp 98.4  F (36.9  C) (Oral)   Wt 67.4 kg (148 lb 8 oz)   SpO2 100%   BMI 24.71 kg/m   Estimated body mass index is 24.71 kg/m  as calculated from the following:    Height as of 2/1/23: 1.651 m (5' 5\").    Weight as of this encounter: 67.4 kg (148 lb 8 oz). Body surface area is 1.76 meters squared.  No Pain (0) Comment: Data Unavailable   No LMP recorded.  Allergies reviewed: Yes  Medications reviewed: Yes    Medications: Medication refills not needed today.  Pharmacy name entered into [facility]:    [facility][id number] - [place], [place] - [address] AT [facility] [address]  [facility][id number] - [facility] [place] - [facility] AT [address] [place] [address]    Clinical concerns: None    Marquez Michaels            "

## 2023-05-17 NOTE — PATIENT INSTRUCTIONS
Loreta Encarnacion is a 27 year old woman with family history of breast cancer. She meets NCCN guidelines for high risk screening based on her family history of breast cancer.       1) Family history of breast cancer  Discussed she meets NCCN guidelines for high risk screening based on family history with lifetime risk for breast cancer of >20%. Screening recommendations based on NCCN guidelines. Be familiar with your breast and promptly report any changes to your health care provider. Clinical encounter every 6-12 months. Annual mammogram with keagan starting 10 years prior to the youngest family member but not less than age 30 or 40 which ever come first. Annual breast MRI to begin 10 years prior to the youngest family member diagnosed but not less than 25 years or 40 which ever come first. Counseling was provided with available strategies including lifestyle modifications and risk reducing intervention.   - Follow up in 1 year for clinic visit with risk assessment and breast exam.   - Screening mammograms to begin at age 40.   - Consider breast MRI at age 40     2) Lifestyle Modifications were provided. - Maintain your best healthy weight. Higher body fat and adult weight gain is associated with increased risk for breast cancer. This increase in risk has been attributed to increase in circulating endogenous estrogen levels from fat tissue.   - Alcohol can raise estrogen. Alcohol consumption, even at moderate levels (1-2 drinks per day), increases breast cancer risk and are best avoided. If you choose to drink alcohol limit alcohol consumption to less than 1 drink per day. (1 ounce of liquor, 6 ounces of wine, or 8 ounces of beer).  - Be active daily and void being sedentary.

## 2023-05-17 NOTE — LETTER
5/17/2023         RE: Loreta Encarnacion  [address]  [facility] [address]        Dear Colleague,    Thank you for referring your patient, Loreta Encarnacion, to the [facility]. Please see a copy of my visit note below.    FOLLOW UP  May 17, 2023     Loreta Encarnacion is a 27 year old woman who presents for family history of breast cancer.      HPI:     Family history of paternal aunt with breast cancer. She does meet NCCN guidelines for high risk screening based on CHAZ/Tyrer-Cuzick: 22.4% lifetime risk.     She was seen in January, 2022 for 1 month of improving area of left breast erythema. Imaging at that time with mammogram and ultrasound was negative.     She was seen 4/20/22 for follow up. At that time her symptoms had resolved.     She denies any breast mass, skin changes, nipple inversion or nipple discharge.      BREAST-SPECIFIC HISTORY:     Previous breast imaging: yes  - 1/19/22 BI-RADS 1     Prior breast biopsies/surgeries: No     Prior history of breast cancer or DCIS: No  Prior radiation history: No  Self breast exams: Yes  Breast density:      GYN HISTORY:  G0  Age at menarche: 10  Menopausal: premonpausal.   Menopausal hormone replacement therapy: No      RISK ASSESSMENT: > 20% lifetime risk   Sarahi:n/a  CHAZ/Tyrer-Cuzick: 22.4% lifetime risk     FAMILY HISTORY:  Breast ca: Yes   - paternal aunt, 50's  Ovarian ca: No  Pancreatic ca: No  Prostate: No  Gastric ca: No  Melanoma: No  Colon ca: No  Other cancer: No  Other genetic, testing, syndromes, or clotting disorders: no     PAST MEDICAL HISTORY  Past Medical History:   Diagnosis Date    ADHD (attention deficit hyperactivity disorder)     Constipation     Epigastric pain 01/10/2018    S/p w/u in [place] (neg US for gallstones, neg EGD for ulcers, improved w/ limited fatty foods)    Hemorrhoids     MAURA III (vulvar intraepithelial neoplasia III)     Spontaneous resolution, CO2 laser 5/30/17. Vulvoscopy  neg 1/31/18. [] Vulvoscopy ~8/2018      PAST SURGICAL HISTORY   Past Surgical History:   Procedure Laterality Date    LASER CO2 VULVA N/A 5/30/2017    Procedure: LASER CO2 VULVA;  Wide Local CO2 Laser Of Vulva;  Surgeon: Yaquelin Rebolledo MD;  Location:  OR     MEDICATIONS  Current Outpatient Medications   Medication Sig Dispense Refill    amphetamine-dextroamphetamine (ADDERALL) 5 MG tablet Take 1 tablet (5 mg) by mouth daily in the early afternoon. 30 tablet 0    Ascorbic Acid (VITAMIN C PO)       lisdexamfetamine (VYVANSE) 40 MG capsule Take 1 capsule (40 mg) by mouth every morning 90 capsule 0    Multiple Vitamins-Minerals (DAILY MULTIVITAMIN PO) Take by mouth daily       VITAMIN D PO       ciclopirox (LOPROX) 0.77 % cream Apply topically 2 times daily (Patient not taking: Reported on 5/17/2023) 30 g 1    clotrimazole (LOTRIMIN) 1 % external cream Apply topically 2 times daily (Patient not taking: Reported on 2/1/2023) 30 g 0    hydrocortisone 2.5 % cream Apply topically 2 times daily as needed for itching (Patient not taking: Reported on 2/1/2023) 20 g 0    imiquimod (ALDARA) 5 % external cream Apply a small sized amount to warts or molluscum three times weekly at bedtime.   Wash off after 8 hours.   May use for up to 16 weeks. (Patient not taking: Reported on 2/1/2023) 12 packet 3    propranolol (INDERAL) 10 MG tablet Take 1-2 tablets (10-20 mg) by mouth 3 times daily as needed (performance-related anxiety) (Patient not taking: Reported on 5/17/2023) 30 tablet 2     ALLERGIES  Allergies   Allergen Reactions    Lactose GI Disturbance    Penicillins Hives     As a child (7 or 8), amoxicillin     SOCIAL HISTORY:  Smokes: No  EtOH: Yes, rarely once per month  Exercise: active    Is an . Previously worked at [employer] and reports heavy chemical exposure. She is planning to try to get pregnant in the fall.     ROS:  Change in vision No  Headaches: no  Respiratory: No shortness of breath, dyspnea on  exertion, cough, or hemoptysis   Cardiovascular: negative   Gastrointestinal: negative Abdominal pain: no  Breast: negative  Musculoskeletal: negative Joint pain No Back pain: no  Psychiatric: negative  Hematologic/Lymphatic/Immunologic: negative  Endocrine: negative    EXAM  /76   Pulse 75   Temp 98.4  F (36.9  C) (Oral)   Wt 67.4 kg (148 lb 8 oz)   SpO2 100%   BMI 24.71 kg/m     PHYSICAL EXAM  Respiratory: breathing non labored.   Breasts: Examination was done in both the upright and supine positions.  Breasts are symmetrical . No dominant fixed or suspicious masses noted. No skin or nipple changes. No nipple discharge.   Nodular breast tissue palpated throughout.   No clavicular, cervical, or axillary lymphadenopathy.     ASSESSMENT/PLAN:    Loreta Encarnacion is a 27 year old woman with family history of breast cancer. She meets NCCN guidelines for high risk screening based on her family history of breast cancer.       1) Family history of breast cancer  Discussed she meets NCCN guidelines for high risk screening based on family history with lifetime risk for breast cancer of >20%. Screening recommendations based on NCCN guidelines. Be familiar with your breast and promptly report any changes to your health care provider. Clinical encounter every 6-12 months. Annual mammogram with keagan starting 10 years prior to the youngest family member but not less than age 30 or 40 which ever come first. Annual breast MRI to begin 10 years prior to the youngest family member diagnosed but not less than 25 years or 40 which ever come first. Counseling was provided with available strategies including lifestyle modifications and risk reducing intervention.   - Follow up in 1 year for clinic visit with risk assessment and breast exam.   - Screening mammograms to begin at age 40.   - Consider breast MRI at age 40     2) Lifestyle Modifications were provided. - Maintain your best healthy weight. Higher body fat and  adult weight gain is associated with increased risk for breast cancer. This increase in risk has been attributed to increase in circulating endogenous estrogen levels from fat tissue.   - Alcohol can raise estrogen. Alcohol consumption, even at moderate levels (1-2 drinks per day), increases breast cancer risk and are best avoided. If you choose to drink alcohol limit alcohol consumption to less than 1 drink per day. (1 ounce of liquor, 6 ounces of wine, or 8 ounces of beer).  - Be active daily and void being sedentary.     Hannah Prabhakar PA-C  20 minutes spent on the date of the encounter doing chart review, review of test results, interpretation of tests, patient visit and documentation.

## 2023-06-22 ENCOUNTER — TRANSFERRED RECORDS (OUTPATIENT)
Dept: HEALTH INFORMATION MANAGEMENT | Facility: CLINIC | Age: 28
End: 2023-06-22

## 2023-07-24 ENCOUNTER — MYC REFILL (OUTPATIENT)
Dept: FAMILY MEDICINE | Facility: CLINIC | Age: 28
End: 2023-07-24

## 2023-07-24 DIAGNOSIS — F90.0 ATTENTION DEFICIT HYPERACTIVITY DISORDER (ADHD), PREDOMINANTLY INATTENTIVE TYPE: ICD-10-CM

## 2023-07-24 RX ORDER — LISDEXAMFETAMINE DIMESYLATE 40 MG/1
40 CAPSULE ORAL EVERY MORNING
Qty: 90 CAPSULE | Refills: 0 | Status: SHIPPED | OUTPATIENT
Start: 2023-07-24

## 2023-07-24 NOTE — TELEPHONE ENCOUNTER
Medication requested: lisdexamfetamine (VYVANSE) 40 MG capsule   Last office visit: 12/15/22  [facility] appointments: none  Medication last refilled: 4/6/23; 90 + 0 refills, last sold 4/12/23 per phone call with pharmacist  Last qualifying labs: N/A    Routing refill request to provider for review/approval because:  Drug not on the [facility] refill protocol     Mitchell ELISE, RN  07/24/23 2:51 PM

## 2023-08-16 ENCOUNTER — TELEPHONE (OUTPATIENT)
Dept: SURGERY | Facility: CLINIC | Age: 28
End: 2023-08-16
Payer: COMMERCIAL

## 2023-11-03 ENCOUNTER — TELEPHONE (OUTPATIENT)
Dept: FAMILY MEDICINE | Facility: CLINIC | Age: 28
End: 2023-11-03

## 2023-11-03 NOTE — TELEPHONE ENCOUNTER
Pt calling clinic asking for information on safety of use of stimulant ADHD medication while pregnant. She has moved to [place] and established care with an obstetrician who is redirecting her questions to her PCP. She has not established care with a primary care clinic where she lives.    Mitchell ELISE, RN  11/03/23 2:49 PM

## 2023-12-16 ENCOUNTER — HEALTH MAINTENANCE LETTER (OUTPATIENT)
Age: 28
End: 2023-12-16

## 2024-01-15 ENCOUNTER — PATIENT OUTREACH (OUTPATIENT)
Dept: OBGYN | Facility: CLINIC | Age: 29
End: 2024-01-15
Payer: COMMERCIAL

## 2024-01-15 PROBLEM — R87.612 PAPANICOLAOU SMEAR OF CERVIX WITH LOW GRADE SQUAMOUS INTRAEPITHELIAL LESION (LGSIL): Status: ACTIVE | Noted: 2023-02-01

## 2024-03-14 NOTE — TELEPHONE ENCOUNTER
FYI to provider - Patient is lost to pap tracking follow-up. Attempts to contact pt have been made per reminder process and there has been no reply and/or no appt scheduled. Contact hx listed below.     2016 NIL Pap  2018 NIL pap  2/19/21 LSIL pap  6/10/21 COLP- DOMINIK 1  2/1/23: [facility]IL pap, Neg HPV. Plan cotest in 1 year per provider due by 2/1/24  2/10/23 Result note sent via [employer]. Pt read results.  1/15/24 Reminder mychart  2/15/24 Reminder call-LM  3/14/24 Lost to follow up

## 2024-05-04 ENCOUNTER — HEALTH MAINTENANCE LETTER (OUTPATIENT)
Age: 29
End: 2024-05-04

## 2024-05-13 ENCOUNTER — TELEPHONE (OUTPATIENT)
Dept: SURGERY | Facility: CLINIC | Age: 29
End: 2024-05-13
Payer: COMMERCIAL

## 2024-05-13 NOTE — TELEPHONE ENCOUNTER
Writer called pt to schedule a Microscopy with Radha Tinoco per a message from Lorna Wallace. Pt stated she is living in [place] now and will receive care there. Writer passed message along to care team.

## 2025-01-12 ENCOUNTER — HEALTH MAINTENANCE LETTER (OUTPATIENT)
Age: 30
End: 2025-01-12

## 2025-05-17 ENCOUNTER — HEALTH MAINTENANCE LETTER (OUTPATIENT)
Age: 30
End: 2025-05-17

## 2025-07-20 NOTE — NURSING NOTE
Chief Complaint   Patient presents with     Procedure     Altagracia Garrido LPN   Repeat labs  Rx for glucose monitor sent

## (undated) DEVICE — GLOVE PROTEXIS W/NEU-THERA 7.0  2D73TE70

## (undated) DEVICE — JELLY LUBRICATING SURGILUBE 2OZ TUBE

## (undated) DEVICE — SWAB PROCTO 16" 2/PK 32-046

## (undated) DEVICE — TUBING SMOKE EVAC 1CMX3M SEA3710

## (undated) DEVICE — DRSG KERLIX FLUFFS X5

## (undated) DEVICE — SOL WATER IRRIG 1000ML BOTTLE 2F7114

## (undated) DEVICE — Device

## (undated) DEVICE — TONGUE DEPRESSOR STERILE 6023

## (undated) DEVICE — COVER CAMERA IN-LIGHT DISP LT-C02

## (undated) DEVICE — SUCTION TIP YANKAUER W/O VENT K86

## (undated) DEVICE — LINEN TOWEL PACK X5 5464

## (undated) DEVICE — PAD CHUX UNDERPAD 30X30"

## (undated) DEVICE — SYR 10ML FINGER CONTROL W/O NDL 309695

## (undated) DEVICE — SOL NACL 0.9% IRRIG 1000ML BOTTLE 2F7124

## (undated) DEVICE — ESU PENCIL W/SMOKE EVAC NEPTUNE STRYKER 0703-046-000

## (undated) DEVICE — SPONGE PACK VAGINAL 2"X9

## (undated) DEVICE — LINEN GOWN X4 5410

## (undated) DEVICE — GLOVE PROTEXIS BLUE W/NEU-THERA 7.5  2D73EB75

## (undated) DEVICE — SUCTION MANIFOLD DORNOCH ULTRA CART UL-CL500

## (undated) DEVICE — DECANTER TRANSFER DEVICE 2008S

## (undated) DEVICE — SUCTION CANNULA UTERINE 14MM CVD 21593A

## (undated) DEVICE — NDL 25GA 1.5" 305127

## (undated) RX ORDER — KETOROLAC TROMETHAMINE 30 MG/ML
INJECTION, SOLUTION INTRAMUSCULAR; INTRAVENOUS
Status: DISPENSED
Start: 2017-05-30

## (undated) RX ORDER — LIDOCAINE HYDROCHLORIDE 20 MG/ML
JELLY TOPICAL
Status: DISPENSED
Start: 2022-12-12

## (undated) RX ORDER — PROPOFOL 10 MG/ML
INJECTION, EMULSION INTRAVENOUS
Status: DISPENSED
Start: 2017-05-30

## (undated) RX ORDER — ONDANSETRON 2 MG/ML
INJECTION INTRAMUSCULAR; INTRAVENOUS
Status: DISPENSED
Start: 2017-05-30

## (undated) RX ORDER — ACETIC ACID 5 %
LIQUID (ML) MISCELLANEOUS
Status: DISPENSED
Start: 2022-12-12

## (undated) RX ORDER — BUPIVACAINE HYDROCHLORIDE 2.5 MG/ML
INJECTION, SOLUTION EPIDURAL; INFILTRATION; INTRACAUDAL
Status: DISPENSED
Start: 2017-05-30

## (undated) RX ORDER — FERRIC SUBSULFATE 0.21 G/G
LIQUID TOPICAL
Status: DISPENSED
Start: 2018-05-17

## (undated) RX ORDER — FENTANYL CITRATE 50 UG/ML
INJECTION, SOLUTION INTRAMUSCULAR; INTRAVENOUS
Status: DISPENSED
Start: 2017-05-30

## (undated) RX ORDER — ACETAMINOPHEN 325 MG/1
TABLET ORAL
Status: DISPENSED
Start: 2017-05-30

## (undated) RX ORDER — LIDOCAINE HYDROCHLORIDE 20 MG/ML
INJECTION, SOLUTION EPIDURAL; INFILTRATION; INTRACAUDAL; PERINEURAL
Status: DISPENSED
Start: 2017-05-30

## (undated) RX ORDER — ACETIC ACID 5 %
LIQUID (ML) MISCELLANEOUS
Status: DISPENSED
Start: 2018-05-17

## (undated) RX ORDER — LIDOCAINE HYDROCHLORIDE AND EPINEPHRINE 10; 10 MG/ML; UG/ML
INJECTION, SOLUTION INFILTRATION; PERINEURAL
Status: DISPENSED
Start: 2018-05-17